# Patient Record
Sex: MALE | Race: WHITE | Employment: OTHER | URBAN - METROPOLITAN AREA
[De-identification: names, ages, dates, MRNs, and addresses within clinical notes are randomized per-mention and may not be internally consistent; named-entity substitution may affect disease eponyms.]

---

## 2019-01-01 ENCOUNTER — OFFICE VISIT (OUTPATIENT)
Dept: PRIMARY CARE CLINIC | Age: 84
End: 2019-01-01
Payer: MEDICARE

## 2019-01-01 ENCOUNTER — OUTSIDE SERVICES (OUTPATIENT)
Dept: PODIATRY | Age: 84
End: 2019-01-01
Payer: MEDICARE

## 2019-01-01 VITALS
DIASTOLIC BLOOD PRESSURE: 50 MMHG | TEMPERATURE: 98 F | BODY MASS INDEX: 21.98 KG/M2 | HEART RATE: 93 BPM | RESPIRATION RATE: 22 BRPM | WEIGHT: 157 LBS | SYSTOLIC BLOOD PRESSURE: 104 MMHG | HEIGHT: 71 IN | OXYGEN SATURATION: 97 %

## 2019-01-01 DIAGNOSIS — J84.10 PULMONARY FIBROSIS (HCC): ICD-10-CM

## 2019-01-01 DIAGNOSIS — J44.9 MODERATE CHRONIC OBSTRUCTIVE PULMONARY DISEASE (HCC): ICD-10-CM

## 2019-01-01 DIAGNOSIS — R26.2 DIFFICULTY WALKING: ICD-10-CM

## 2019-01-01 DIAGNOSIS — M79.675 PAIN IN LEFT TOE(S): ICD-10-CM

## 2019-01-01 DIAGNOSIS — M79.674 PAIN IN TOE OF RIGHT FOOT: ICD-10-CM

## 2019-01-01 DIAGNOSIS — F03.918 SENILE DEMENTIA WITH DELIRIUM WITH BEHAVIORAL DISTURBANCE: Primary | ICD-10-CM

## 2019-01-01 DIAGNOSIS — I73.9 PERIPHERAL VASCULAR DISEASE, UNSPECIFIED (HCC): ICD-10-CM

## 2019-01-01 DIAGNOSIS — B35.1 TINEA UNGUIUM: Primary | ICD-10-CM

## 2019-01-01 DIAGNOSIS — K59.09 OTHER CONSTIPATION: ICD-10-CM

## 2019-01-01 DIAGNOSIS — I10 ESSENTIAL HYPERTENSION: ICD-10-CM

## 2019-01-01 DIAGNOSIS — F05 SENILE DEMENTIA WITH DELIRIUM WITH BEHAVIORAL DISTURBANCE: Primary | ICD-10-CM

## 2019-01-01 DIAGNOSIS — R33.9 URINARY RETENTION: ICD-10-CM

## 2019-01-01 DIAGNOSIS — Z23 NEED FOR VACCINATION FOR STREP PNEUMONIAE: ICD-10-CM

## 2019-01-01 PROCEDURE — G0009 ADMIN PNEUMOCOCCAL VACCINE: HCPCS | Performed by: FAMILY MEDICINE

## 2019-01-01 PROCEDURE — G8420 CALC BMI NORM PARAMETERS: HCPCS | Performed by: FAMILY MEDICINE

## 2019-01-01 PROCEDURE — 90670 PCV13 VACCINE IM: CPT | Performed by: FAMILY MEDICINE

## 2019-01-01 PROCEDURE — 1123F ACP DISCUSS/DSCN MKR DOCD: CPT | Performed by: FAMILY MEDICINE

## 2019-01-01 PROCEDURE — G8482 FLU IMMUNIZE ORDER/ADMIN: HCPCS | Performed by: FAMILY MEDICINE

## 2019-01-01 PROCEDURE — 11721 DEBRIDE NAIL 6 OR MORE: CPT | Performed by: PODIATRIST

## 2019-01-01 ASSESSMENT — ENCOUNTER SYMPTOMS
EYES NEGATIVE: 1
RESPIRATORY NEGATIVE: 1

## 2019-03-24 ENCOUNTER — HOSPITAL ENCOUNTER (OUTPATIENT)
Age: 84
Setting detail: OBSERVATION
Discharge: OTHER INSTITUTION | End: 2019-03-26
Attending: EMERGENCY MEDICINE | Admitting: INTERNAL MEDICINE
Payer: MEDICARE

## 2019-03-24 DIAGNOSIS — R33.9 URINARY RETENTION: Primary | ICD-10-CM

## 2019-03-24 DIAGNOSIS — N17.9 ACUTE RENAL FAILURE, UNSPECIFIED ACUTE RENAL FAILURE TYPE (HCC): ICD-10-CM

## 2019-03-24 LAB
ANION GAP SERPL CALCULATED.3IONS-SCNC: 14 MMOL/L (ref 7–16)
BASOPHILS ABSOLUTE: 0 E9/L (ref 0–0.2)
BASOPHILS RELATIVE PERCENT: 0.2 % (ref 0–2)
BILIRUBIN URINE: NEGATIVE
BLOOD, URINE: NEGATIVE
BUN BLDV-MCNC: 56 MG/DL (ref 8–23)
CALCIUM SERPL-MCNC: 8.6 MG/DL (ref 8.6–10.2)
CHLORIDE BLD-SCNC: 104 MMOL/L (ref 98–107)
CLARITY: CLEAR
CO2: 23 MMOL/L (ref 22–29)
COLOR: YELLOW
CREAT SERPL-MCNC: 2.4 MG/DL (ref 0.7–1.2)
EOSINOPHILS ABSOLUTE: 0.21 E9/L (ref 0.05–0.5)
EOSINOPHILS RELATIVE PERCENT: 1.7 % (ref 0–6)
GFR AFRICAN AMERICAN: 31
GFR NON-AFRICAN AMERICAN: 26 ML/MIN/1.73
GLUCOSE BLD-MCNC: 118 MG/DL (ref 74–99)
GLUCOSE URINE: NEGATIVE MG/DL
HCT VFR BLD CALC: 43 % (ref 37–54)
HEMOGLOBIN: 13.6 G/DL (ref 12.5–16.5)
KETONES, URINE: NEGATIVE MG/DL
LEUKOCYTE ESTERASE, URINE: NEGATIVE
LYMPHOCYTES ABSOLUTE: 1.26 E9/L (ref 1.5–4)
LYMPHOCYTES RELATIVE PERCENT: 9.6 % (ref 20–42)
MCH RBC QN AUTO: 30.5 PG (ref 26–35)
MCHC RBC AUTO-ENTMCNC: 31.6 % (ref 32–34.5)
MCV RBC AUTO: 96.4 FL (ref 80–99.9)
MONOCYTES ABSOLUTE: 1.89 E9/L (ref 0.1–0.95)
MONOCYTES RELATIVE PERCENT: 14.8 % (ref 2–12)
NEUTROPHILS ABSOLUTE: 9.32 E9/L (ref 1.8–7.3)
NEUTROPHILS RELATIVE PERCENT: 73.9 % (ref 43–80)
NITRITE, URINE: NEGATIVE
PDW BLD-RTO: 14.1 FL (ref 11.5–15)
PH UA: 5.5 (ref 5–9)
PLATELET # BLD: 154 E9/L (ref 130–450)
PMV BLD AUTO: 12 FL (ref 7–12)
POTASSIUM SERPL-SCNC: 4.7 MMOL/L (ref 3.5–5)
PROSTATE SPECIFIC ANTIGEN: 1.38 NG/ML (ref 0–4)
PROTEIN UA: NEGATIVE MG/DL
RBC # BLD: 4.46 E12/L (ref 3.8–5.8)
RBC # BLD: NORMAL 10*6/UL
SODIUM BLD-SCNC: 141 MMOL/L (ref 132–146)
SPECIFIC GRAVITY UA: 1.02 (ref 1–1.03)
UROBILINOGEN, URINE: 0.2 E.U./DL
WBC # BLD: 12.6 E9/L (ref 4.5–11.5)

## 2019-03-24 PROCEDURE — 96360 HYDRATION IV INFUSION INIT: CPT

## 2019-03-24 PROCEDURE — 81003 URINALYSIS AUTO W/O SCOPE: CPT

## 2019-03-24 PROCEDURE — 2580000003 HC RX 258: Performed by: INTERNAL MEDICINE

## 2019-03-24 PROCEDURE — 36415 COLL VENOUS BLD VENIPUNCTURE: CPT

## 2019-03-24 PROCEDURE — G0103 PSA SCREENING: HCPCS

## 2019-03-24 PROCEDURE — 6370000000 HC RX 637 (ALT 250 FOR IP): Performed by: INTERNAL MEDICINE

## 2019-03-24 PROCEDURE — G0378 HOSPITAL OBSERVATION PER HR: HCPCS

## 2019-03-24 PROCEDURE — 2580000003 HC RX 258: Performed by: EMERGENCY MEDICINE

## 2019-03-24 PROCEDURE — 94640 AIRWAY INHALATION TREATMENT: CPT

## 2019-03-24 PROCEDURE — 85025 COMPLETE CBC W/AUTO DIFF WBC: CPT

## 2019-03-24 PROCEDURE — 99284 EMERGENCY DEPT VISIT MOD MDM: CPT

## 2019-03-24 PROCEDURE — 51702 INSERT TEMP BLADDER CATH: CPT

## 2019-03-24 PROCEDURE — 80048 BASIC METABOLIC PNL TOTAL CA: CPT

## 2019-03-24 RX ORDER — OLANZAPINE 5 MG/1
5 TABLET ORAL NIGHTLY
Status: DISCONTINUED | OUTPATIENT
Start: 2019-03-24 | End: 2019-03-26 | Stop reason: HOSPADM

## 2019-03-24 RX ORDER — IPRATROPIUM BROMIDE AND ALBUTEROL SULFATE 2.5; .5 MG/3ML; MG/3ML
1 SOLUTION RESPIRATORY (INHALATION) 4 TIMES DAILY
Status: DISCONTINUED | OUTPATIENT
Start: 2019-03-24 | End: 2019-03-26 | Stop reason: HOSPADM

## 2019-03-24 RX ORDER — GUAIFENESIN 600 MG/1
1200 TABLET, EXTENDED RELEASE ORAL 2 TIMES DAILY
Status: DISCONTINUED | OUTPATIENT
Start: 2019-03-24 | End: 2019-03-26 | Stop reason: HOSPADM

## 2019-03-24 RX ORDER — FINASTERIDE 5 MG/1
5 TABLET, FILM COATED ORAL DAILY
Status: DISCONTINUED | OUTPATIENT
Start: 2019-03-24 | End: 2019-03-26 | Stop reason: HOSPADM

## 2019-03-24 RX ORDER — TAMSULOSIN HYDROCHLORIDE 0.4 MG/1
0.4 CAPSULE ORAL DAILY
Status: DISCONTINUED | OUTPATIENT
Start: 2019-03-24 | End: 2019-03-26 | Stop reason: HOSPADM

## 2019-03-24 RX ORDER — ACETAMINOPHEN 325 MG/1
650 TABLET ORAL EVERY 6 HOURS PRN
Status: DISCONTINUED | OUTPATIENT
Start: 2019-03-24 | End: 2019-03-26 | Stop reason: HOSPADM

## 2019-03-24 RX ORDER — DUTASTERIDE 0.5 MG/1
0.5 CAPSULE, LIQUID FILLED ORAL DAILY
Status: DISCONTINUED | OUTPATIENT
Start: 2019-03-24 | End: 2019-03-24 | Stop reason: CLARIF

## 2019-03-24 RX ORDER — RIVASTIGMINE 4.6 MG/24H
1 PATCH, EXTENDED RELEASE TRANSDERMAL DAILY
Status: DISCONTINUED | OUTPATIENT
Start: 2019-03-24 | End: 2019-03-26 | Stop reason: HOSPADM

## 2019-03-24 RX ORDER — SODIUM CHLORIDE, SODIUM LACTATE, POTASSIUM CHLORIDE, CALCIUM CHLORIDE 600; 310; 30; 20 MG/100ML; MG/100ML; MG/100ML; MG/100ML
INJECTION, SOLUTION INTRAVENOUS CONTINUOUS
Status: DISCONTINUED | OUTPATIENT
Start: 2019-03-24 | End: 2019-03-26 | Stop reason: HOSPADM

## 2019-03-24 RX ORDER — FUROSEMIDE 20 MG/1
20 TABLET ORAL DAILY
Status: DISCONTINUED | OUTPATIENT
Start: 2019-03-24 | End: 2019-03-24

## 2019-03-24 RX ORDER — ONDANSETRON 4 MG/1
4 TABLET, ORALLY DISINTEGRATING ORAL EVERY 6 HOURS PRN
Status: DISCONTINUED | OUTPATIENT
Start: 2019-03-24 | End: 2019-03-26 | Stop reason: HOSPADM

## 2019-03-24 RX ORDER — POLYVINYL ALCOHOL 14 MG/ML
1 SOLUTION/ DROPS OPHTHALMIC PRN
Status: DISCONTINUED | OUTPATIENT
Start: 2019-03-24 | End: 2019-03-26 | Stop reason: HOSPADM

## 2019-03-24 RX ORDER — FUROSEMIDE 20 MG/1
20 TABLET ORAL DAILY
COMMUNITY
Start: 2018-09-27 | End: 2020-01-01 | Stop reason: ALTCHOICE

## 2019-03-24 RX ORDER — MIRTAZAPINE 15 MG/1
30 TABLET, FILM COATED ORAL NIGHTLY
Status: DISCONTINUED | OUTPATIENT
Start: 2019-03-24 | End: 2019-03-26 | Stop reason: HOSPADM

## 2019-03-24 RX ORDER — 0.9 % SODIUM CHLORIDE 0.9 %
1000 INTRAVENOUS SOLUTION INTRAVENOUS ONCE
Status: COMPLETED | OUTPATIENT
Start: 2019-03-24 | End: 2019-03-24

## 2019-03-24 RX ORDER — MEGESTROL ACETATE 40 MG/ML
400 SUSPENSION ORAL DAILY
Status: DISCONTINUED | OUTPATIENT
Start: 2019-03-24 | End: 2019-03-26 | Stop reason: HOSPADM

## 2019-03-24 RX ORDER — ASPIRIN 81 MG/1
81 TABLET, CHEWABLE ORAL DAILY
Status: DISCONTINUED | OUTPATIENT
Start: 2019-03-24 | End: 2019-03-26 | Stop reason: HOSPADM

## 2019-03-24 RX ADMIN — ASPIRIN 81 MG 81 MG: 81 TABLET ORAL at 19:39

## 2019-03-24 RX ADMIN — MEGESTROL ACETATE 400 MG: 40 SUSPENSION ORAL at 19:40

## 2019-03-24 RX ADMIN — TAMSULOSIN HYDROCHLORIDE 0.4 MG: 0.4 CAPSULE ORAL at 19:39

## 2019-03-24 RX ADMIN — FINASTERIDE 5 MG: 5 TABLET, FILM COATED ORAL at 19:39

## 2019-03-24 RX ADMIN — SERTRALINE HYDROCHLORIDE 50 MG: 50 TABLET ORAL at 19:39

## 2019-03-24 RX ADMIN — IPRATROPIUM BROMIDE AND ALBUTEROL SULFATE 3 ML: .5; 3 SOLUTION RESPIRATORY (INHALATION) at 18:01

## 2019-03-24 RX ADMIN — SODIUM CHLORIDE, POTASSIUM CHLORIDE, SODIUM LACTATE AND CALCIUM CHLORIDE: 600; 310; 30; 20 INJECTION, SOLUTION INTRAVENOUS at 17:30

## 2019-03-24 RX ADMIN — GUAIFENESIN 1200 MG: 600 TABLET, EXTENDED RELEASE ORAL at 21:10

## 2019-03-24 RX ADMIN — SODIUM CHLORIDE 1000 ML: 9 INJECTION, SOLUTION INTRAVENOUS at 09:02

## 2019-03-24 RX ADMIN — MIRTAZAPINE 30 MG: 15 TABLET, FILM COATED ORAL at 21:09

## 2019-03-24 RX ADMIN — OLANZAPINE 5 MG: 5 TABLET, FILM COATED ORAL at 21:09

## 2019-03-24 ASSESSMENT — ENCOUNTER SYMPTOMS
SORE THROAT: 0
COUGH: 0
SHORTNESS OF BREATH: 0
RHINORRHEA: 0

## 2019-03-24 ASSESSMENT — PAIN SCALES - GENERAL
PAINLEVEL_OUTOF10: 0
PAINLEVEL_OUTOF10: 7
PAINLEVEL_OUTOF10: 0

## 2019-03-24 ASSESSMENT — PAIN DESCRIPTION - DESCRIPTORS: DESCRIPTORS: ACHING

## 2019-03-24 ASSESSMENT — PAIN DESCRIPTION - FREQUENCY: FREQUENCY: CONTINUOUS

## 2019-03-24 ASSESSMENT — PAIN DESCRIPTION - PAIN TYPE: TYPE: ACUTE PAIN

## 2019-03-24 ASSESSMENT — PAIN DESCRIPTION - ORIENTATION: ORIENTATION: LOWER

## 2019-03-24 ASSESSMENT — PAIN DESCRIPTION - LOCATION: LOCATION: ABDOMEN

## 2019-03-25 ENCOUNTER — APPOINTMENT (OUTPATIENT)
Dept: ULTRASOUND IMAGING | Age: 84
End: 2019-03-25
Payer: MEDICARE

## 2019-03-25 LAB
ALBUMIN SERPL-MCNC: 3.5 G/DL (ref 3.5–5.2)
ALP BLD-CCNC: 68 U/L (ref 40–129)
ALT SERPL-CCNC: 23 U/L (ref 0–40)
ANION GAP SERPL CALCULATED.3IONS-SCNC: 11 MMOL/L (ref 7–16)
AST SERPL-CCNC: 27 U/L (ref 0–39)
BASOPHILS ABSOLUTE: 0.02 E9/L (ref 0–0.2)
BASOPHILS RELATIVE PERCENT: 0.2 % (ref 0–2)
BILIRUB SERPL-MCNC: 0.4 MG/DL (ref 0–1.2)
BUN BLDV-MCNC: 41 MG/DL (ref 8–23)
CALCIUM SERPL-MCNC: 8.1 MG/DL (ref 8.6–10.2)
CHLORIDE BLD-SCNC: 107 MMOL/L (ref 98–107)
CHOLESTEROL, TOTAL: 116 MG/DL (ref 0–199)
CO2: 24 MMOL/L (ref 22–29)
CREAT SERPL-MCNC: 1.5 MG/DL (ref 0.7–1.2)
EOSINOPHILS ABSOLUTE: 0.28 E9/L (ref 0.05–0.5)
EOSINOPHILS RELATIVE PERCENT: 3.2 % (ref 0–6)
GFR AFRICAN AMERICAN: 53
GFR NON-AFRICAN AMERICAN: 44 ML/MIN/1.73
GLUCOSE BLD-MCNC: 108 MG/DL (ref 74–99)
HBA1C MFR BLD: 5.4 % (ref 4–5.6)
HCT VFR BLD CALC: 36 % (ref 37–54)
HDLC SERPL-MCNC: 30 MG/DL
HEMOGLOBIN: 11.3 G/DL (ref 12.5–16.5)
IMMATURE GRANULOCYTES #: 0.05 E9/L
IMMATURE GRANULOCYTES %: 0.6 % (ref 0–5)
LDL CHOLESTEROL CALCULATED: 64 MG/DL (ref 0–99)
LYMPHOCYTES ABSOLUTE: 2.68 E9/L (ref 1.5–4)
LYMPHOCYTES RELATIVE PERCENT: 30.6 % (ref 20–42)
MAGNESIUM: 2.2 MG/DL (ref 1.6–2.6)
MCH RBC QN AUTO: 30.1 PG (ref 26–35)
MCHC RBC AUTO-ENTMCNC: 31.4 % (ref 32–34.5)
MCV RBC AUTO: 96 FL (ref 80–99.9)
MONOCYTES ABSOLUTE: 1.15 E9/L (ref 0.1–0.95)
MONOCYTES RELATIVE PERCENT: 13.1 % (ref 2–12)
NEUTROPHILS ABSOLUTE: 4.57 E9/L (ref 1.8–7.3)
NEUTROPHILS RELATIVE PERCENT: 52.3 % (ref 43–80)
PDW BLD-RTO: 13.8 FL (ref 11.5–15)
PHOSPHORUS: 2.6 MG/DL (ref 2.5–4.5)
PLATELET # BLD: 131 E9/L (ref 130–450)
PMV BLD AUTO: 11.3 FL (ref 7–12)
POTASSIUM SERPL-SCNC: 4.5 MMOL/L (ref 3.5–5)
RBC # BLD: 3.75 E12/L (ref 3.8–5.8)
SEDIMENTATION RATE, ERYTHROCYTE: 25 MM/HR (ref 0–15)
SODIUM BLD-SCNC: 142 MMOL/L (ref 132–146)
TOTAL PROTEIN: 6.5 G/DL (ref 6.4–8.3)
TRIGL SERPL-MCNC: 111 MG/DL (ref 0–149)
TSH SERPL DL<=0.05 MIU/L-ACNC: 1.69 UIU/ML (ref 0.27–4.2)
VLDLC SERPL CALC-MCNC: 22 MG/DL
WBC # BLD: 8.8 E9/L (ref 4.5–11.5)

## 2019-03-25 PROCEDURE — 97161 PT EVAL LOW COMPLEX 20 MIN: CPT | Performed by: PHYSICAL THERAPIST

## 2019-03-25 PROCEDURE — 76770 US EXAM ABDO BACK WALL COMP: CPT

## 2019-03-25 PROCEDURE — 6370000000 HC RX 637 (ALT 250 FOR IP): Performed by: INTERNAL MEDICINE

## 2019-03-25 PROCEDURE — 84100 ASSAY OF PHOSPHORUS: CPT

## 2019-03-25 PROCEDURE — 80053 COMPREHEN METABOLIC PANEL: CPT

## 2019-03-25 PROCEDURE — 85025 COMPLETE CBC W/AUTO DIFF WBC: CPT

## 2019-03-25 PROCEDURE — 2580000003 HC RX 258: Performed by: INTERNAL MEDICINE

## 2019-03-25 PROCEDURE — 83036 HEMOGLOBIN GLYCOSYLATED A1C: CPT

## 2019-03-25 PROCEDURE — G0378 HOSPITAL OBSERVATION PER HR: HCPCS

## 2019-03-25 PROCEDURE — 84443 ASSAY THYROID STIM HORMONE: CPT

## 2019-03-25 PROCEDURE — 94640 AIRWAY INHALATION TREATMENT: CPT

## 2019-03-25 PROCEDURE — 6370000000 HC RX 637 (ALT 250 FOR IP): Performed by: UROLOGY

## 2019-03-25 PROCEDURE — 97116 GAIT TRAINING THERAPY: CPT | Performed by: PHYSICAL THERAPIST

## 2019-03-25 PROCEDURE — 85651 RBC SED RATE NONAUTOMATED: CPT

## 2019-03-25 PROCEDURE — 83735 ASSAY OF MAGNESIUM: CPT

## 2019-03-25 PROCEDURE — 36415 COLL VENOUS BLD VENIPUNCTURE: CPT

## 2019-03-25 PROCEDURE — 97530 THERAPEUTIC ACTIVITIES: CPT

## 2019-03-25 PROCEDURE — 80061 LIPID PANEL: CPT

## 2019-03-25 RX ORDER — BETHANECHOL CHLORIDE 5 MG
10 TABLET ORAL 3 TIMES DAILY
Status: DISCONTINUED | OUTPATIENT
Start: 2019-03-25 | End: 2019-03-26 | Stop reason: HOSPADM

## 2019-03-25 RX ADMIN — ASPIRIN 81 MG 81 MG: 81 TABLET ORAL at 08:49

## 2019-03-25 RX ADMIN — BETHANECHOL CHLORIDE 10 MG: 5 TABLET ORAL at 13:37

## 2019-03-25 RX ADMIN — MIRTAZAPINE 30 MG: 15 TABLET, FILM COATED ORAL at 20:57

## 2019-03-25 RX ADMIN — IPRATROPIUM BROMIDE AND ALBUTEROL SULFATE 3 ML: .5; 3 SOLUTION RESPIRATORY (INHALATION) at 17:48

## 2019-03-25 RX ADMIN — GUAIFENESIN 1200 MG: 600 TABLET, EXTENDED RELEASE ORAL at 08:48

## 2019-03-25 RX ADMIN — MEGESTROL ACETATE 400 MG: 40 SUSPENSION ORAL at 08:49

## 2019-03-25 RX ADMIN — IPRATROPIUM BROMIDE AND ALBUTEROL SULFATE 3 ML: .5; 3 SOLUTION RESPIRATORY (INHALATION) at 09:54

## 2019-03-25 RX ADMIN — BETHANECHOL CHLORIDE 10 MG: 5 TABLET ORAL at 08:48

## 2019-03-25 RX ADMIN — TAMSULOSIN HYDROCHLORIDE 0.4 MG: 0.4 CAPSULE ORAL at 08:48

## 2019-03-25 RX ADMIN — BETHANECHOL CHLORIDE 10 MG: 5 TABLET ORAL at 20:57

## 2019-03-25 RX ADMIN — FINASTERIDE 5 MG: 5 TABLET, FILM COATED ORAL at 08:50

## 2019-03-25 RX ADMIN — SODIUM CHLORIDE, POTASSIUM CHLORIDE, SODIUM LACTATE AND CALCIUM CHLORIDE: 600; 310; 30; 20 INJECTION, SOLUTION INTRAVENOUS at 13:07

## 2019-03-25 RX ADMIN — OLANZAPINE 5 MG: 5 TABLET, FILM COATED ORAL at 20:57

## 2019-03-25 RX ADMIN — IPRATROPIUM BROMIDE AND ALBUTEROL SULFATE 3 ML: .5; 3 SOLUTION RESPIRATORY (INHALATION) at 13:54

## 2019-03-25 RX ADMIN — SERTRALINE HYDROCHLORIDE 50 MG: 50 TABLET ORAL at 08:48

## 2019-03-25 RX ADMIN — IPRATROPIUM BROMIDE AND ALBUTEROL SULFATE 3 ML: .5; 3 SOLUTION RESPIRATORY (INHALATION) at 06:17

## 2019-03-25 RX ADMIN — GUAIFENESIN 1200 MG: 600 TABLET, EXTENDED RELEASE ORAL at 20:57

## 2019-03-25 ASSESSMENT — PAIN SCALES - GENERAL
PAINLEVEL_OUTOF10: 0
PAINLEVEL_OUTOF10: 0

## 2019-03-26 VITALS
DIASTOLIC BLOOD PRESSURE: 71 MMHG | SYSTOLIC BLOOD PRESSURE: 142 MMHG | BODY MASS INDEX: 26.53 KG/M2 | OXYGEN SATURATION: 98 % | WEIGHT: 169 LBS | TEMPERATURE: 97.4 F | RESPIRATION RATE: 17 BRPM | HEIGHT: 67 IN | HEART RATE: 78 BPM

## 2019-03-26 LAB
ANION GAP SERPL CALCULATED.3IONS-SCNC: 8 MMOL/L (ref 7–16)
BASOPHILS ABSOLUTE: 0 E9/L (ref 0–0.2)
BASOPHILS RELATIVE PERCENT: 0.2 % (ref 0–2)
BUN BLDV-MCNC: 29 MG/DL (ref 8–23)
CALCIUM SERPL-MCNC: 8.6 MG/DL (ref 8.6–10.2)
CHLORIDE BLD-SCNC: 106 MMOL/L (ref 98–107)
CO2: 27 MMOL/L (ref 22–29)
CREAT SERPL-MCNC: 1.3 MG/DL (ref 0.7–1.2)
EOSINOPHILS ABSOLUTE: 0.64 E9/L (ref 0.05–0.5)
EOSINOPHILS RELATIVE PERCENT: 6.2 % (ref 0–6)
GFR AFRICAN AMERICAN: >60
GFR NON-AFRICAN AMERICAN: 52 ML/MIN/1.73
GLUCOSE BLD-MCNC: 108 MG/DL (ref 74–99)
HCT VFR BLD CALC: 35.6 % (ref 37–54)
HEMOGLOBIN: 11.4 G/DL (ref 12.5–16.5)
LYMPHOCYTES ABSOLUTE: 2.08 E9/L (ref 1.5–4)
LYMPHOCYTES RELATIVE PERCENT: 19.5 % (ref 20–42)
MCH RBC QN AUTO: 30.6 PG (ref 26–35)
MCHC RBC AUTO-ENTMCNC: 32 % (ref 32–34.5)
MCV RBC AUTO: 95.7 FL (ref 80–99.9)
MONOCYTES ABSOLUTE: 1.25 E9/L (ref 0.1–0.95)
MONOCYTES RELATIVE PERCENT: 11.5 % (ref 2–12)
NEUTROPHILS ABSOLUTE: 6.55 E9/L (ref 1.8–7.3)
NEUTROPHILS RELATIVE PERCENT: 62.8 % (ref 43–80)
PDW BLD-RTO: 13.6 FL (ref 11.5–15)
PLATELET # BLD: 135 E9/L (ref 130–450)
PMV BLD AUTO: 11.7 FL (ref 7–12)
POTASSIUM SERPL-SCNC: 4.3 MMOL/L (ref 3.5–5)
RBC # BLD: 3.72 E12/L (ref 3.8–5.8)
SODIUM BLD-SCNC: 141 MMOL/L (ref 132–146)
WBC # BLD: 10.4 E9/L (ref 4.5–11.5)

## 2019-03-26 PROCEDURE — G0378 HOSPITAL OBSERVATION PER HR: HCPCS

## 2019-03-26 PROCEDURE — 97110 THERAPEUTIC EXERCISES: CPT

## 2019-03-26 PROCEDURE — 97530 THERAPEUTIC ACTIVITIES: CPT

## 2019-03-26 PROCEDURE — 80048 BASIC METABOLIC PNL TOTAL CA: CPT

## 2019-03-26 PROCEDURE — 6370000000 HC RX 637 (ALT 250 FOR IP): Performed by: INTERNAL MEDICINE

## 2019-03-26 PROCEDURE — 85025 COMPLETE CBC W/AUTO DIFF WBC: CPT

## 2019-03-26 PROCEDURE — 94640 AIRWAY INHALATION TREATMENT: CPT

## 2019-03-26 PROCEDURE — 6370000000 HC RX 637 (ALT 250 FOR IP): Performed by: UROLOGY

## 2019-03-26 PROCEDURE — 36415 COLL VENOUS BLD VENIPUNCTURE: CPT

## 2019-03-26 PROCEDURE — 2580000003 HC RX 258: Performed by: INTERNAL MEDICINE

## 2019-03-26 RX ORDER — BETHANECHOL CHLORIDE 10 MG/1
10 TABLET ORAL 3 TIMES DAILY
Qty: 90 TABLET | Refills: 3 | Status: SHIPPED | OUTPATIENT
Start: 2019-03-26 | End: 2019-07-02 | Stop reason: SDUPTHER

## 2019-03-26 RX ADMIN — BETHANECHOL CHLORIDE 10 MG: 5 TABLET ORAL at 13:30

## 2019-03-26 RX ADMIN — SODIUM CHLORIDE, POTASSIUM CHLORIDE, SODIUM LACTATE AND CALCIUM CHLORIDE: 600; 310; 30; 20 INJECTION, SOLUTION INTRAVENOUS at 00:12

## 2019-03-26 RX ADMIN — ASPIRIN 81 MG 81 MG: 81 TABLET ORAL at 09:05

## 2019-03-26 RX ADMIN — TAMSULOSIN HYDROCHLORIDE 0.4 MG: 0.4 CAPSULE ORAL at 09:05

## 2019-03-26 RX ADMIN — GUAIFENESIN 1200 MG: 600 TABLET, EXTENDED RELEASE ORAL at 09:05

## 2019-03-26 RX ADMIN — IPRATROPIUM BROMIDE AND ALBUTEROL SULFATE 3 ML: .5; 3 SOLUTION RESPIRATORY (INHALATION) at 09:30

## 2019-03-26 RX ADMIN — SODIUM CHLORIDE, POTASSIUM CHLORIDE, SODIUM LACTATE AND CALCIUM CHLORIDE: 600; 310; 30; 20 INJECTION, SOLUTION INTRAVENOUS at 12:21

## 2019-03-26 RX ADMIN — SERTRALINE HYDROCHLORIDE 50 MG: 50 TABLET ORAL at 09:05

## 2019-03-26 RX ADMIN — IPRATROPIUM BROMIDE AND ALBUTEROL SULFATE 3 ML: .5; 3 SOLUTION RESPIRATORY (INHALATION) at 05:58

## 2019-03-26 RX ADMIN — MEGESTROL ACETATE 400 MG: 40 SUSPENSION ORAL at 09:05

## 2019-03-26 RX ADMIN — FINASTERIDE 5 MG: 5 TABLET, FILM COATED ORAL at 09:05

## 2019-03-26 RX ADMIN — BETHANECHOL CHLORIDE 10 MG: 5 TABLET ORAL at 09:05

## 2019-03-26 ASSESSMENT — PAIN SCALES - GENERAL: PAINLEVEL_OUTOF10: 0

## 2019-04-14 ENCOUNTER — APPOINTMENT (OUTPATIENT)
Dept: CT IMAGING | Age: 84
End: 2019-04-14
Payer: MEDICARE

## 2019-04-14 ENCOUNTER — APPOINTMENT (OUTPATIENT)
Dept: GENERAL RADIOLOGY | Age: 84
End: 2019-04-14
Payer: MEDICARE

## 2019-04-14 ENCOUNTER — HOSPITAL ENCOUNTER (EMERGENCY)
Age: 84
Discharge: HOME | End: 2019-04-15
Attending: EMERGENCY MEDICINE
Payer: MEDICARE

## 2019-04-14 DIAGNOSIS — N30.00 ACUTE CYSTITIS WITHOUT HEMATURIA: Primary | ICD-10-CM

## 2019-04-14 LAB
ANION GAP SERPL CALCULATED.3IONS-SCNC: 11 MMOL/L (ref 7–16)
BASOPHILS ABSOLUTE: 0.03 E9/L (ref 0–0.2)
BASOPHILS RELATIVE PERCENT: 0.3 % (ref 0–2)
BUN BLDV-MCNC: 43 MG/DL (ref 8–23)
CALCIUM SERPL-MCNC: 9.2 MG/DL (ref 8.6–10.2)
CHLORIDE BLD-SCNC: 108 MMOL/L (ref 98–107)
CO2: 22 MMOL/L (ref 22–29)
CREAT SERPL-MCNC: 1.7 MG/DL (ref 0.7–1.2)
EOSINOPHILS ABSOLUTE: 0.51 E9/L (ref 0.05–0.5)
EOSINOPHILS RELATIVE PERCENT: 4.5 % (ref 0–6)
GFR AFRICAN AMERICAN: 46
GFR NON-AFRICAN AMERICAN: 38 ML/MIN/1.73
GLUCOSE BLD-MCNC: 151 MG/DL (ref 74–99)
HCT VFR BLD CALC: 36.5 % (ref 37–54)
HEMOGLOBIN: 11.6 G/DL (ref 12.5–16.5)
IMMATURE GRANULOCYTES #: 0.08 E9/L
IMMATURE GRANULOCYTES %: 0.7 % (ref 0–5)
LYMPHOCYTES ABSOLUTE: 1.56 E9/L (ref 1.5–4)
LYMPHOCYTES RELATIVE PERCENT: 13.8 % (ref 20–42)
MCH RBC QN AUTO: 30.1 PG (ref 26–35)
MCHC RBC AUTO-ENTMCNC: 31.8 % (ref 32–34.5)
MCV RBC AUTO: 94.8 FL (ref 80–99.9)
MONOCYTES ABSOLUTE: 1.49 E9/L (ref 0.1–0.95)
MONOCYTES RELATIVE PERCENT: 13.2 % (ref 2–12)
NEUTROPHILS ABSOLUTE: 7.64 E9/L (ref 1.8–7.3)
NEUTROPHILS RELATIVE PERCENT: 67.5 % (ref 43–80)
PDW BLD-RTO: 13.2 FL (ref 11.5–15)
PLATELET # BLD: 200 E9/L (ref 130–450)
PMV BLD AUTO: 11.4 FL (ref 7–12)
POTASSIUM REFLEX MAGNESIUM: 5 MMOL/L (ref 3.5–5)
RBC # BLD: 3.85 E12/L (ref 3.8–5.8)
SODIUM BLD-SCNC: 141 MMOL/L (ref 132–146)
WBC # BLD: 11.3 E9/L (ref 4.5–11.5)

## 2019-04-14 PROCEDURE — 70450 CT HEAD/BRAIN W/O DYE: CPT

## 2019-04-14 PROCEDURE — 2580000003 HC RX 258: Performed by: EMERGENCY MEDICINE

## 2019-04-14 PROCEDURE — 80048 BASIC METABOLIC PNL TOTAL CA: CPT

## 2019-04-14 PROCEDURE — 12001 RPR S/N/AX/GEN/TRNK 2.5CM/<: CPT

## 2019-04-14 PROCEDURE — 73030 X-RAY EXAM OF SHOULDER: CPT

## 2019-04-14 PROCEDURE — 85025 COMPLETE CBC W/AUTO DIFF WBC: CPT

## 2019-04-14 PROCEDURE — 99284 EMERGENCY DEPT VISIT MOD MDM: CPT

## 2019-04-14 PROCEDURE — 36415 COLL VENOUS BLD VENIPUNCTURE: CPT

## 2019-04-14 RX ORDER — 0.9 % SODIUM CHLORIDE 0.9 %
1000 INTRAVENOUS SOLUTION INTRAVENOUS ONCE
Status: COMPLETED | OUTPATIENT
Start: 2019-04-14 | End: 2019-04-15

## 2019-04-14 RX ADMIN — SODIUM CHLORIDE 1000 ML: 9 INJECTION, SOLUTION INTRAVENOUS at 23:53

## 2019-04-15 VITALS
DIASTOLIC BLOOD PRESSURE: 70 MMHG | TEMPERATURE: 98.5 F | OXYGEN SATURATION: 95 % | BODY MASS INDEX: 27.32 KG/M2 | HEART RATE: 91 BPM | RESPIRATION RATE: 24 BRPM | SYSTOLIC BLOOD PRESSURE: 120 MMHG | WEIGHT: 170 LBS | HEIGHT: 66 IN

## 2019-04-15 LAB
BACTERIA: ABNORMAL /HPF
BILIRUBIN URINE: NEGATIVE
BLOOD, URINE: ABNORMAL
CLARITY: ABNORMAL
COLOR: YELLOW
GLUCOSE URINE: NEGATIVE MG/DL
KETONES, URINE: NEGATIVE MG/DL
LEUKOCYTE ESTERASE, URINE: ABNORMAL
NITRITE, URINE: POSITIVE
PH UA: 5 (ref 5–9)
PROTEIN UA: ABNORMAL MG/DL
RBC UA: ABNORMAL /HPF (ref 0–2)
SPECIFIC GRAVITY UA: 1.02 (ref 1–1.03)
UROBILINOGEN, URINE: 0.2 E.U./DL
WBC UA: ABNORMAL /HPF (ref 0–5)

## 2019-04-15 PROCEDURE — 2580000003 HC RX 258: Performed by: EMERGENCY MEDICINE

## 2019-04-15 PROCEDURE — 81001 URINALYSIS AUTO W/SCOPE: CPT

## 2019-04-15 PROCEDURE — 87088 URINE BACTERIA CULTURE: CPT

## 2019-04-15 PROCEDURE — 6360000002 HC RX W HCPCS: Performed by: EMERGENCY MEDICINE

## 2019-04-15 PROCEDURE — 96374 THER/PROPH/DIAG INJ IV PUSH: CPT

## 2019-04-15 PROCEDURE — 87186 SC STD MICRODIL/AGAR DIL: CPT

## 2019-04-15 RX ORDER — CEPHALEXIN 250 MG/1
250 CAPSULE ORAL 2 TIMES DAILY
Qty: 10 CAPSULE | Refills: 0 | Status: SHIPPED | OUTPATIENT
Start: 2019-04-15 | End: 2019-04-20

## 2019-04-15 RX ADMIN — WATER 2 G: 1 INJECTION INTRAMUSCULAR; INTRAVENOUS; SUBCUTANEOUS at 02:32

## 2019-04-15 NOTE — ED PROVIDER NOTES
HPI:  4/14/19,   Time: 11:29 PM         Maureen Shane is a 80 y.o. male presenting to the ED for fall, beginning prior to arrival.  The complaint has been persistent, moderate in severity, and worsened by nothing. Patient's an 42-year-old male who sustained a mechanical fall, struck the back of his head, injured his right shoulder. No loss of consciousness. Patient with history of dementia and alert and oriented to self only. ROS:   Pertinent positives and negatives are stated within HPI, all other systems reviewed and are negative.  --------------------------------------------- PAST HISTORY ---------------------------------------------  Past Medical History:  has a past medical history of Chronic kidney disease, COPD (chronic obstructive pulmonary disease) ([facility] [address] Dementia, Depression, Hypertension, Neuromuscular disorder ([facility] [address] Other disorders of kidney and ureter in diseases classified elsewhere, Pneumonia, and Psychosis ([address] Past Surgical History:  has a past surgical history that includes Kidney removal; Abdomen surgery; Appendectomy; Tonsillectomy; Cholecystectomy; Colonoscopy; and Endoscopy, colon, diagnostic. Social History:  reports that he has quit smoking. He quit after 25.00 years of use. He has never used smokeless tobacco. He reports that he does not drink alcohol or use drugs. Family History: Family history is unknown by patient. The patients home medications have been reviewed. Allergies: Patient has no known allergies.     -------------------------------------------------- RESULTS -------------------------------------------------  All laboratory and radiology results have been personally reviewed by myself   LABS:  Results for orders placed or performed during the hospital encounter of 04/14/19   Urine Culture   Result Value Ref Range    Urine Culture, Routine      Organism Escherichia coli (A)     Urine Culture, Routine >100,000 CFU/ml  Sensitivity to follow      CBC Auto Differential   Result Value Ref Range    WBC 11.3 4.5 - 11.5 E9/L    RBC 3.85 3.80 - 5.80 E12/L    Hemoglobin 11.6 (L) 12.5 - 16.5 g/dL    Hematocrit 36.5 (L) 37.0 - 54.0 %    MCV 94.8 80.0 - 99.9 fL    MCH 30.1 26.0 - 35.0 pg    MCHC 31.8 (L) 32.0 - 34.5 %    RDW 13.2 11.5 - 15.0 fL    Platelets 339 873 - 322 E9/L    MPV 11.4 7.0 - 12.0 fL    Neutrophils % 67.5 43.0 - 80.0 %    Immature Granulocytes % 0.7 0.0 - 5.0 %    Lymphocytes % 13.8 (L) 20.0 - 42.0 %    Monocytes % 13.2 (H) 2.0 - 12.0 %    Eosinophils % 4.5 0.0 - 6.0 %    Basophils % 0.3 0.0 - 2.0 %    Neutrophils # 7.64 (H) 1.80 - 7.30 E9/L    Immature Granulocytes # 0.08 E9/L    Lymphocytes # 1.56 1.50 - 4.00 E9/L    Monocytes # 1.49 (H) 0.10 - 0.95 E9/L    Eosinophils # 0.51 (H) 0.05 - 0.50 E9/L    Basophils # 0.03 0.00 - 0.20 J3/M   Basic Metabolic Panel w/ Reflex to MG   Result Value Ref Range    Sodium 141 132 - 146 mmol/L    Potassium reflex Magnesium 5.0 3.5 - 5.0 mmol/L    Chloride 108 (H) 98 - 107 mmol/L    CO2 22 22 - 29 mmol/L    Anion Gap 11 7 - 16 mmol/L    Glucose 151 (H) 74 - 99 mg/dL    BUN 43 (H) 8 - 23 mg/dL    CREATININE 1.7 (H) 0.7 - 1.2 mg/dL    GFR Non-African American 38 >=60 mL/min/1.73    GFR African American 46     Calcium 9.2 8.6 - 10.2 mg/dL   Urinalysis, reflex to microscopic   Result Value Ref Range    Color, UA Yellow Straw/Yellow    Clarity, UA CLOUDY (A) Clear    Glucose, Ur Negative Negative mg/dL    Bilirubin Urine Negative Negative    Ketones, Urine Negative Negative mg/dL    Specific Gravity, UA 1.025 1.005 - 1.030    Blood, Urine SMALL (A) Negative    pH, UA 5.0 5.0 - 9.0    Protein, UA TRACE Negative mg/dL    Urobilinogen, Urine 0.2 <2.0 E.U./dL    Nitrite, Urine POSITIVE (A) Negative    Leukocyte Esterase, Urine LARGE (A) Negative   Microscopic Urinalysis   Result Value Ref Range    WBC, UA many 0 - 5 /HPF    RBC, UA 1-3 0 - 2 /HPF    Bacteria, UA MANY (A) /HPF   EKG 12 Lead   Result Value Ref Range    Ventricular Rate 87 BPM    Atrial Rate 87 BPM    P-R Interval 170 ms    QRS Duration 62 ms    Q-T Interval 348 ms    QTc Calculation (Bazett) 418 ms    P Axis 41 degrees    R Axis 24 degrees    T Axis 46 degrees       RADIOLOGY:  Interpreted by Radiologist.  XR SHOULDER RIGHT (MIN 2 VIEWS)   Final Result   1. No acute fracture. 2. Probable rotator cuff insufficiency. CT Head WO Contrast   Final Result   No acute intracranial hemorrhage or mass effect.             ------------------------- NURSING NOTES AND VITALS REVIEWED ---------------------------   The nursing notes within the ED encounter and vital signs as below have been reviewed. /70   Pulse 91   Temp 98.5 °F (36.9 °C) (Oral)   Resp 24   Ht 5' 6\" (1.676 m)   Wt 170 lb (77.1 kg)   SpO2 95%   BMI 27.44 kg/m²   Oxygen Saturation Interpretation: Normal      ---------------------------------------------------PHYSICAL EXAM--------------------------------------      Constitutional/General: Alert and oriented x1, well appearing, non toxic in NAD  Head: NC with small laceration to right posterior parietal area  Eyes: PERRL, EOMI  Mouth: Oropharynx clear, handling secretions, no trismus  Neck: Supple, full ROM, no meningeal signs  Pulmonary: Lungs clear to auscultation bilaterally, no wheezes, rales, or rhonchi. Not in respiratory distress  Cardiovascular:  Regular rate and rhythm, no murmurs, gallops, or rubs. 2+ distal pulses  Abdomen: Soft, non tender, non distended,   Extremities: Moves all extremities x 3. Warm and well perfused, right shoulder with anterior swelling and decreased ROM.   Skin: warm and dry without rash  Neurologic: GCS 15,  Psych: Normal Affect      ------------------------------ ED COURSE/MEDICAL DECISION MAKING----------------------  Medications   0.9 % sodium chloride bolus (0 mLs Intravenous Stopped 4/15/19 0137)   cefTRIAXone (ROCEPHIN) 2 g in sterile water 20 mL IV syringe (2 g Intravenous Given 4/15/19 6952)         Medical Decision Making:    Check CT head--no acute findings    LACERATION REPAIR  PROCEDURE NOTE:  Unless otherwise indicated, this procedure was done or directly supervised by the ED attending. Laceration #: 1. Location: right posterior parietal  Length: less than 1 cm. The wound area was cleansend with shur-clens and draped in a sterile fashion. The wound area was anesthetized with Lidocaine 1% without epinephrine. WOUND COMPLEXITY:    Debridement: partial thickness and None. Undermining: None. Wound Margins Revised: None. Flaps Aligned: no. The wound was explored with the following results no foreign body or tendon injury seen. The wound was closed with Dermabond. Counseling: The emergency provider has spoken with the patient and spouse/SO and discussed todays results, in addition to providing specific details for the plan of care and counseling regarding the diagnosis and prognosis. Questions are answered at this time and they are agreeable with the plan.      --------------------------------- IMPRESSION AND DISPOSITION ---------------------------------    IMPRESSION  1.  Acute cystitis without hematuria        DISPOSITION  Disposition: Discharge to nursing home  Patient condition is good                  Stevie Oreilly MD  04/16/19 2022

## 2019-04-17 LAB
ORGANISM: ABNORMAL
URINE CULTURE, ROUTINE: ABNORMAL
URINE CULTURE, ROUTINE: ABNORMAL

## 2019-04-22 LAB
EKG ATRIAL RATE: 87 BPM
EKG P AXIS: 41 DEGREES
EKG P-R INTERVAL: 170 MS
EKG Q-T INTERVAL: 348 MS
EKG QRS DURATION: 62 MS
EKG QTC CALCULATION (BAZETT): 418 MS
EKG R AXIS: 24 DEGREES
EKG T AXIS: 46 DEGREES
EKG VENTRICULAR RATE: 87 BPM

## 2019-06-03 DIAGNOSIS — N30.00 ACUTE CYSTITIS WITHOUT HEMATURIA: Primary | ICD-10-CM

## 2019-06-03 RX ORDER — SULFAMETHOXAZOLE AND TRIMETHOPRIM 800; 160 MG/1; MG/1
1 TABLET ORAL 2 TIMES DAILY
Qty: 14 TABLET | Refills: 0 | Status: SHIPPED | OUTPATIENT
Start: 2019-06-03 | End: 2019-06-08

## 2019-06-07 RX ORDER — ONDANSETRON 4 MG/1
TABLET, FILM COATED ORAL
Qty: 30 TABLET | Refills: 11 | Status: SHIPPED
Start: 2019-06-07 | End: 2020-01-01 | Stop reason: ALTCHOICE

## 2019-06-08 DIAGNOSIS — K59.09 OTHER CONSTIPATION: ICD-10-CM

## 2019-06-08 DIAGNOSIS — N40.0 ENLARGED PROSTATE WITHOUT LOWER URINARY TRACT SYMPTOMS (LUTS): ICD-10-CM

## 2019-06-08 DIAGNOSIS — Z91.81 HISTORY OF FALLING: ICD-10-CM

## 2019-06-08 DIAGNOSIS — F03.91 DEMENTIA WITH BEHAVIORAL DISTURBANCE, UNSPECIFIED DEMENTIA TYPE: ICD-10-CM

## 2019-06-08 DIAGNOSIS — Z87.01 HISTORY OF RECURRENT PNEUMONIA: ICD-10-CM

## 2019-06-08 RX ORDER — MIRTAZAPINE 15 MG/1
1 TABLET, FILM COATED ORAL NIGHTLY
Refills: 11 | COMMUNITY
Start: 2019-05-08 | End: 2019-08-28 | Stop reason: SDUPTHER

## 2019-06-08 RX ORDER — ASPIRIN 81 MG/1
1 TABLET, COATED ORAL DAILY
Refills: 11 | COMMUNITY
Start: 2016-07-20

## 2019-06-09 NOTE — PROGRESS NOTES
11/2018. Feb. Social history: . Was  to CAROLEE for 30 years. No children. S[facility] in [employer] [address] [place] [address] [place]. Non-smoker, no alcohol. Reg diet. DNR-CCA. PHYSICAL EXAM:      Vitals:    06/10/19 0943   BP: (!) 124/94   Pulse: 92   Resp: 20   Temp: 97.6 °F (36.4 °C)   SpO2: 98%  Comment: 4 LPM   Weight: 162 lb 6.4 oz (73.7 kg)   Height: 5' 6\" (1.676 m)        GEN: elderly male patient sitting in recliner in NAD with easy respirations. HEAD:  atraumatic, normocephalic. EYES:  EOMI, PERRL, L with mod-severe cataract, unable to see with L eye, bilateral conjunctiva normal.   ENT:   Fair hearing, EACs with small wax, TMs nml, nasal septum midline, mild congestion, oral cavity without lesions, edentulous full dentures. NECK:  fair-good ROM, no palpable masses, no carotid bruits, no JVD. LUNGS:  No rales, rhonchi or wheezes. HEART:  RRR, no murmurs or gallops. ABD:  soft, non-tender to palp, no palp masses or HSM, normal BS. BACK: Lumbar scar, no scoliosis or kyphosis, non-tender to palp. EXTREMITIES:  No edema, no ulcerations, varicosities or erythema. No gross deformities. MUSCULOSKELETAL:  limited ROM of all joints, non tender to palp and or with movement. Very decreased AROM L arm, has good passive ROM. SKIN:  No ulcerations or breakdown, rash, ecchymosis, or other lesions. NEURO:  Able to stand with help, walks slowly with walker. No tremor, motor UEs 5/5 LEs  5/5, sensory normal, mild ataxia. PSYCH:  Pleasant and cooperative. Fluid speech, interactive and oriented to person only. No delusional statements.        Meds reviewed  Labs:  2/13/19 GFR 52, BUN 28, lytes nl, Pro BNP 38 11/7/18 proBNP 53  8/28/18 Gfr 50, lytes nml, ProBNP/ 87.0  6/4/18 CXR/ no definite acute or suspicious process  5/1/18 GFR: 60.3, BUN 15, CR 1.2, Lytes nl, LFTs nl, H/H 12/38, TSH 3.80, Pro BNP 67  3/27/18 CXR/ resolving BL bibasilar infiltrate/pneumonia  3/6/18 CXR/ Patchy bibasilar infiltrate/pneumonia    ASSESSMENT:  Elderly male with has Pulmonary fibrosis ([facility] [address] Urinary retention; Dysthymia; Essential hypertension; Unspecified dementia with behavioral disturbance; History of falling; Enlarged prostate without lower urinary tract symptoms (luts); Other constipation; Unspecified chronic bronchitis ([address] and History of recurrent pneumonia on their problem list.     Diagnoses attached to this encounter:  Ean Yanez was seen today for shortness of breath. Diagnoses and all orders for this visit:    Acute cystitis without hematuria    Pulmonary fibrosis ([facility] [address]    Dementia with behavioral disturbance, unspecified dementia type    History of falling       PLAN:  Finish Bactrim. Check BMP and proBNP. Continue O2 with nebulizer txs QID. Monitor BMP and Pro BNP q 3 months next in Sept. Use walker while ambulating to prevent falls. Continue other meds as per Rx List.   Recheck 1 month. 40 minutes spent on visit, 25 minutes regarding pulmonary, dementia, cardiac, HTN and depression disease processes, treatment options, meds and coordination of care with CHAR. Current Outpatient Medications   Medication Sig Dispense Refill    ASPIRIN LOW DOSE 81 MG EC tablet Take 1 tablet by mouth daily  11    MUCINEX 600 MG extended release tablet Take 1 tablet by mouth 2 times daily  11    mirtazapine (REMERON) 15 MG tablet Take 1 tablet by mouth nightly  11    ondansetron (ZOFRAN) 4 MG tablet TAKE 1 TABLET BY MOUTH THREE TIMES DAILY AS NEEDED FOR N/V. 30 tablet 11    bethanechol (URECHOLINE) 10 MG tablet Take 1 tablet by mouth 3 times daily 90 tablet 3    furosemide (LASIX) 20 MG tablet Take 20 mg by mouth daily      OXYGEN 2L NC; Titrate to SpO2 92-95%.  1 Device 0    sertraline (ZOLOFT) 50 MG tablet Take 50 mg by mouth daily      OLANZapine (ZYPREXA) 5 MG tablet Take 5 mg by mouth nightly      ipratropium-albuterol (DUONEB) 0.5-2.5 (3) MG/3ML SOLN nebulizer solution Inhale 1 vial into the lungs 4 times

## 2019-06-10 ENCOUNTER — NURSE ONLY (OUTPATIENT)
Dept: PRIMARY CARE CLINIC | Age: 84
End: 2019-06-10
Payer: MEDICARE

## 2019-06-10 VITALS
HEIGHT: 66 IN | BODY MASS INDEX: 26.1 KG/M2 | OXYGEN SATURATION: 98 % | RESPIRATION RATE: 20 BRPM | SYSTOLIC BLOOD PRESSURE: 124 MMHG | HEART RATE: 92 BPM | WEIGHT: 162.4 LBS | TEMPERATURE: 97.6 F | DIASTOLIC BLOOD PRESSURE: 94 MMHG

## 2019-06-10 DIAGNOSIS — J84.10 PULMONARY FIBROSIS (HCC): ICD-10-CM

## 2019-06-10 DIAGNOSIS — F03.91 DEMENTIA WITH BEHAVIORAL DISTURBANCE, UNSPECIFIED DEMENTIA TYPE: ICD-10-CM

## 2019-06-10 DIAGNOSIS — N30.00 ACUTE CYSTITIS WITHOUT HEMATURIA: Primary | ICD-10-CM

## 2019-06-10 DIAGNOSIS — I10 ESSENTIAL HYPERTENSION: ICD-10-CM

## 2019-06-10 DIAGNOSIS — Z91.81 HISTORY OF FALLING: ICD-10-CM

## 2019-06-11 DIAGNOSIS — I10 ESSENTIAL HYPERTENSION: ICD-10-CM

## 2019-06-12 DIAGNOSIS — I10 ESSENTIAL HYPERTENSION: ICD-10-CM

## 2019-07-02 RX ORDER — BETHANECHOL CHLORIDE 10 MG/1
10 TABLET ORAL 3 TIMES DAILY
Qty: 90 TABLET | Refills: 10 | Status: SHIPPED
Start: 2019-07-02 | End: 2020-01-01

## 2019-07-05 ENCOUNTER — OUTSIDE SERVICES (OUTPATIENT)
Dept: PODIATRY | Age: 84
End: 2019-07-05
Payer: MEDICARE

## 2019-07-05 DIAGNOSIS — M79.675 PAIN IN LEFT TOE(S): ICD-10-CM

## 2019-07-05 DIAGNOSIS — I73.9 PERIPHERAL VASCULAR DISEASE, UNSPECIFIED (HCC): ICD-10-CM

## 2019-07-05 DIAGNOSIS — R26.2 DIFFICULTY WALKING: ICD-10-CM

## 2019-07-05 DIAGNOSIS — M79.674 PAIN IN TOE OF RIGHT FOOT: ICD-10-CM

## 2019-07-05 DIAGNOSIS — B35.1 TINEA UNGUIUM: Primary | ICD-10-CM

## 2019-07-05 PROCEDURE — 11721 DEBRIDE NAIL 6 OR MORE: CPT | Performed by: PODIATRIST

## 2019-07-05 NOTE — PROGRESS NOTES
[id number] [facility] patient     No chief complaint on file. Subjective:  Sonja Vera is seen in nursing home  per nursing for foot and nail care. Pt currently has complaint of thickened, painful, elongated nails that he/she cannot manage by themselves. Pt. Relates pain to nails with shoe gear. Pt's primary care physician is Lilli Pak MD.6/1/2019  Past Medical History:   Diagnosis Date    Chronic kidney disease     COPD (chronic obstructive pulmonary disease) ([facility] [address]     Dementia     Depression     Hypertension     Neuromuscular disorder ([facility] [address]     Other disorders of kidney and ureter in diseases classified elsewhere     Pneumonia     Psychosis ([facility] [address]        No Known Allergies  Current Outpatient Medications on File Prior to Visit   Medication Sig Dispense Refill    bethanechol (URECHOLINE) 10 MG tablet TAKE 1 TABLET BY MOUTH 3 TIMES DAILY 90 tablet 10    ASPIRIN LOW DOSE 81 MG EC tablet Take 1 tablet by mouth daily  11    MUCINEX 600 MG extended release tablet Take 1 tablet by mouth 2 times daily  11    mirtazapine (REMERON) 15 MG tablet Take 1 tablet by mouth nightly  11    ondansetron (ZOFRAN) 4 MG tablet TAKE 1 TABLET BY MOUTH THREE TIMES DAILY AS NEEDED FOR N/V. 30 tablet 11    furosemide (LASIX) 20 MG tablet Take 20 mg by mouth daily      OXYGEN 2L NC; Titrate to SpO2 92-95%. 1 Device 0    sertraline (ZOLOFT) 50 MG tablet Take 50 mg by mouth daily      OLANZapine (ZYPREXA) 5 MG tablet Take 5 mg by mouth nightly      ipratropium-albuterol (DUONEB) 0.5-2.5 (3) MG/3ML SOLN nebulizer solution Inhale 1 vial into the lungs 4 times daily.  rivastigmine (EXELON) 4.6 MG/24HR Place 1 patch onto the skin daily.  magnesium hydroxide (MILK OF MAGNESIA CONCENTRATE) 2400 MG/10ML SUSP Take 2,400 mg by mouth daily as needed.  acetaminophen 650 MG TABS Take 650 mg by mouth every 6 hours as needed for Pain.  120 tablet     tamsulosin (FLOMAX) 0.4 MG capsule Take 1 capsule

## 2019-07-15 ENCOUNTER — TELEPHONE (OUTPATIENT)
Dept: PRIMARY CARE CLINIC | Age: 84
End: 2019-07-15

## 2019-07-15 RX ORDER — CEFUROXIME AXETIL 500 MG/1
500 TABLET ORAL 2 TIMES DAILY
Qty: 20 TABLET | Refills: 0 | Status: SHIPPED | OUTPATIENT
Start: 2019-07-15 | End: 2019-07-25

## 2019-07-19 ENCOUNTER — OFFICE VISIT (OUTPATIENT)
Dept: PRIMARY CARE CLINIC | Age: 84
End: 2019-07-19
Payer: MEDICARE

## 2019-07-19 VITALS
DIASTOLIC BLOOD PRESSURE: 54 MMHG | HEART RATE: 87 BPM | HEIGHT: 66 IN | RESPIRATION RATE: 18 BRPM | OXYGEN SATURATION: 98 % | SYSTOLIC BLOOD PRESSURE: 104 MMHG | BODY MASS INDEX: 26.74 KG/M2 | WEIGHT: 166.4 LBS | TEMPERATURE: 97.4 F

## 2019-07-19 DIAGNOSIS — J84.10 PULMONARY FIBROSIS (HCC): ICD-10-CM

## 2019-07-19 DIAGNOSIS — J44.9 CHRONIC OBSTRUCTIVE PULMONARY DISEASE, UNSPECIFIED COPD TYPE (HCC): ICD-10-CM

## 2019-07-19 DIAGNOSIS — N39.0 UTI (URINARY TRACT INFECTION), BACTERIAL: Primary | ICD-10-CM

## 2019-07-19 DIAGNOSIS — I10 ESSENTIAL HYPERTENSION: ICD-10-CM

## 2019-07-19 DIAGNOSIS — F03.91 DEMENTIA WITH BEHAVIORAL DISTURBANCE, UNSPECIFIED DEMENTIA TYPE: ICD-10-CM

## 2019-07-19 DIAGNOSIS — A49.9 UTI (URINARY TRACT INFECTION), BACTERIAL: Primary | ICD-10-CM

## 2019-08-12 ENCOUNTER — TELEPHONE (OUTPATIENT)
Dept: PRIMARY CARE CLINIC | Age: 84
End: 2019-08-12

## 2019-08-12 RX ORDER — SULFAMETHOXAZOLE AND TRIMETHOPRIM 800; 160 MG/1; MG/1
1 TABLET ORAL 2 TIMES DAILY
Qty: 20 TABLET | Refills: 0 | Status: SHIPPED | OUTPATIENT
Start: 2019-08-12 | End: 2019-08-22

## 2019-08-16 ENCOUNTER — OFFICE VISIT (OUTPATIENT)
Dept: PRIMARY CARE CLINIC | Age: 84
End: 2019-08-16
Payer: MEDICARE

## 2019-08-16 VITALS
HEART RATE: 86 BPM | TEMPERATURE: 98.5 F | OXYGEN SATURATION: 97 % | HEIGHT: 71 IN | SYSTOLIC BLOOD PRESSURE: 105 MMHG | DIASTOLIC BLOOD PRESSURE: 55 MMHG | BODY MASS INDEX: 23.41 KG/M2 | RESPIRATION RATE: 20 BRPM | WEIGHT: 167.2 LBS

## 2019-08-16 DIAGNOSIS — Z91.81 HISTORY OF FALLING: ICD-10-CM

## 2019-08-16 DIAGNOSIS — R26.2 DIFFICULTY WALKING: ICD-10-CM

## 2019-08-16 DIAGNOSIS — I10 ESSENTIAL HYPERTENSION: Primary | ICD-10-CM

## 2019-08-16 DIAGNOSIS — J44.9 CHRONIC OBSTRUCTIVE PULMONARY DISEASE, UNSPECIFIED COPD TYPE (HCC): ICD-10-CM

## 2019-08-16 DIAGNOSIS — N39.0 UTI (URINARY TRACT INFECTION), BACTERIAL: ICD-10-CM

## 2019-08-16 DIAGNOSIS — A49.9 UTI (URINARY TRACT INFECTION), BACTERIAL: ICD-10-CM

## 2019-08-16 NOTE — PROGRESS NOTES
hospitalizations, surgeries): Unknown lumbar surgery. [facility] October 2013. Known allergies: NKA. Ongoing medical problems: Dementia with behaviors, Depression, HTN, Psychosis, BPH, constipation. Family medical history: Non-contributory. Preventative: Flu vac 11/2018. Feb. Social history: . Was  to CAROLEE for 30 years. No children. Served in [employer] [address] [place] [address] [employer]. Non-smoker, no alcohol. Reg diet. DNR-CCA. PHYSICAL EXAM:    intermediate monthly BP: 148/92  P: 84  Wt. ^ 0.8 lbs. Vitals:    08/16/19 0946   BP: (!) 105/55   Pulse: 86   Resp: 20   Temp: 98.5 °F (36.9 °C)   TempSrc: Tympanic   SpO2: 97%  Comment: 3 lpm   Weight: 167 lb 3.2 oz (75.8 kg)   Height: 5' 11\" (1.803 m)        GEN: elderly male patient sitting in recliner in NAD with easy respirations. HEAD:  atraumatic, normocephalic. EYES:  EOMI, PERRL, L with mod-severe cataract, unable to see with L eye, bilateral conjunctiva normal.   ENT:   Fair hearing, EACs with small wax, TMs nml, nasal septum midline, mild congestion, oral cavity without lesions, edentulous full dentures. NECK:  fair-good ROM, no palpable masses, no carotid bruits, no JVD. LUNGS:  No rales, rhonchi or wheezes. HEART:  RRR, no murmurs or gallops. ABD:  soft, non-tender to palp, no palp masses or HSM, normal BS. BACK: Lumbar scar, no scoliosis or kyphosis, non-tender to palp. EXTREMITIES:  No edema, no ulcerations, varicosities or erythema. No gross deformities. MUSCULOSKELETAL:  limited ROM of all joints, non tender to palp and or with movement. Very decreased AROM L arm, has good passive ROM. SKIN:  No ulcerations or breakdown, rash, ecchymosis, or other lesions. NEURO:  Able to stand with help, walks slowly with walker. No tremor, motor UEs 5/5 LEs  5/5, sensory normal, mild ataxia. PSYCH:  Pleasant and cooperative. Fluid speech, interactive and oriented to person only. No delusional statements.        Meds reviewed Labs: 8/12/19 UA C&S e-coli  7/13/19 UA C&S >100,000 E-Coli   6/12/19   6/11/19 GFR 46, Bun 33  2/13/19 GFR 52, BUN 28, lytes nl, Pro BNP 38 11/7/18 proBNP 53  8/28/18 Gfr 50, lytes nml, ProBNP/ 87.0  6/4/18 CXR/ no definite acute or suspicious process  5/1/18 GFR: 60.3, BUN 15, CR 1.2, Lytes nl, LFTs nl, H/H 12/38, TSH 3.80, Pro BNP 67  3/27/18 CXR/ resolving BL bibasilar infiltrate/pneumonia  3/6/18 CXR/ Patchy bibasilar infiltrate/pneumonia    ASSESSMENT:  Elderly male with has Pulmonary fibrosis ([address] Urinary retention; Dysthymia; Essential hypertension; Unspecified dementia with behavioral disturbance; History of falling; Enlarged prostate without lower urinary tract symptoms (luts); Other constipation; Unspecified chronic bronchitis ([address] History of recurrent pneumonia; Tinea unguium; Peripheral vascular disease, unspecified ([address] Pain in left toe(s); Pain in toe of right foot; Difficulty walking; COPD (chronic obstructive pulmonary disease) ([address] and UTI (urinary tract infection), bacterial on their problem list.     Sidney Jauregui was seen today for urinary tract infection and nausea & vomiting. Diagnoses and all orders for this visit:    Essential hypertension    Chronic obstructive pulmonary disease, unspecified COPD type ([address]    UTI (urinary tract infection), bacterial    History of falling    Difficulty walking         PLAN:  Finish Bactrim for UTI. Continue O2 (2-4 LPM per NC continuous every shift). Use nebulizer txs QID. Monitor BMP and Pro BNP q 3 months next in Sept along with yearly labs. Use walker while ambulating to prevent falls. Continue other meds as per Rx List.   Recheck 1 month. 40 minutes spent on visit, 25 minutes regarding pulmonary, dementia, cardiac, HTN and depression disease processes, treatment options, meds and coordination of care with retirement.     Current Outpatient Medications   Medication Sig Dispense Refill    sulfamethoxazole-trimethoprim (BACTRIM DS;SEPTRA DS) 800-160

## 2019-08-28 RX ORDER — TAMSULOSIN HYDROCHLORIDE 0.4 MG/1
CAPSULE ORAL
Qty: 30 CAPSULE | Refills: 10 | Status: SHIPPED
Start: 2019-08-28 | End: 2020-01-01

## 2019-08-28 RX ORDER — MIRTAZAPINE 15 MG/1
TABLET, FILM COATED ORAL
Qty: 31 TABLET | Refills: 10 | Status: SHIPPED
Start: 2019-08-28 | End: 2020-01-01

## 2019-08-30 RX ORDER — RIVASTIGMINE 4.6 MG/24H
1 PATCH, EXTENDED RELEASE TRANSDERMAL DAILY
Qty: 30 PATCH | Refills: 11 | Status: SHIPPED | OUTPATIENT
Start: 2019-08-30 | End: 2020-01-01

## 2019-09-16 ENCOUNTER — OFFICE VISIT (OUTPATIENT)
Dept: PRIMARY CARE CLINIC | Age: 84
End: 2019-09-16
Payer: MEDICARE

## 2019-09-16 VITALS
RESPIRATION RATE: 18 BRPM | HEART RATE: 83 BPM | BODY MASS INDEX: 22.2 KG/M2 | OXYGEN SATURATION: 98 % | HEIGHT: 71 IN | DIASTOLIC BLOOD PRESSURE: 55 MMHG | TEMPERATURE: 97.7 F | WEIGHT: 158.6 LBS | SYSTOLIC BLOOD PRESSURE: 105 MMHG

## 2019-09-16 DIAGNOSIS — R26.2 DIFFICULTY WALKING: ICD-10-CM

## 2019-09-16 DIAGNOSIS — J44.9 CHRONIC OBSTRUCTIVE PULMONARY DISEASE, UNSPECIFIED COPD TYPE (HCC): ICD-10-CM

## 2019-09-16 DIAGNOSIS — F03.91 DEMENTIA WITH BEHAVIORAL DISTURBANCE, UNSPECIFIED DEMENTIA TYPE: ICD-10-CM

## 2019-09-16 DIAGNOSIS — I10 ESSENTIAL HYPERTENSION: Primary | ICD-10-CM

## 2019-09-16 DIAGNOSIS — Z91.81 HISTORY OF FALLING: ICD-10-CM

## 2019-09-16 DIAGNOSIS — N18.9 CHRONIC KIDNEY DISEASE, UNSPECIFIED CKD STAGE: ICD-10-CM

## 2019-09-16 DIAGNOSIS — R33.9 URINARY RETENTION: ICD-10-CM

## 2019-09-16 NOTE — PROGRESS NOTES
Ongoing evaluation of chronic medical problems. This patient has Pulmonary fibrosis ([address] Urinary retention; Dysthymia; Essential hypertension; Unspecified dementia with behavioral disturbance; History of falling; Enlarged prostate without lower urinary tract symptoms (luts); Other constipation; Unspecified chronic bronchitis ([address] History of recurrent pneumonia; Tinea unguium; Peripheral vascular disease, unspecified ([address] Pain in left toe(s); Pain in toe of right foot; Difficulty walking; COPD (chronic obstructive pulmonary disease) ([address] UTI (urinary tract infection), bacterial; and Chronic kidney disease on their problem list.     Home visit medically necessary in lieu of an office visit due to: halfway resident, bedbound, dementia, difficult to get out. HPI:  No recent urinary infections. Today he says he feels good. No URI/cough or wheezing. Receives regular breathing treatments. O2 sat on 3 LPM at 98%. Denies nausea or abdominal pain. He walks with his walker. No recent falls. No behaviors. Generally, pleasant disposition. REVIEW OF SYSTEMS:   GENERAL: Appetite good. Gradual  weight gain, generally healthy, no change in strength or exercise tolerance. CAN'T SEE MUCH OUT OF L EYE. CARDIOVASCULAR: No edema, no chest pains, no murmurs, no palpitations, no syncope, no orthopnea. RESPIRATORY: no pain with breathing, no wheezing, no hemoptysis, no cough. GASTROINTESTINAL: No change in appetite, no dysphagia, no heartburn, no abdominal pains, no constipation or diarrhea, no bowel habit changes, no emesis, no melena, no hemorrhoids. GENITOURINARY: No incontinence or retention, no urinary urgency, no nocturia, no frequent UTIs, no dysuria, no change in nature of urine. MUSCULOSKELETAL: No pain in muscles or joints, no swelling or redness of joints, no limitation of range of motion, no weakness or numbness. All other systems negative.        PAST MEDICAL HISTORY:  (Major events,

## 2019-09-17 LAB
ALBUMIN SERPL-MCNC: 4 G/DL
ALP BLD-CCNC: 86 U/L
ALT SERPL-CCNC: 13 U/L
ANION GAP SERPL CALCULATED.3IONS-SCNC: ABNORMAL MMOL/L
AST SERPL-CCNC: 22 U/L
B-TYPE NATRIURETIC PEPTIDE: 79 PG/ML
BASOPHILS ABSOLUTE: ABNORMAL /ΜL
BASOPHILS RELATIVE PERCENT: ABNORMAL %
BILIRUB SERPL-MCNC: 0.3 MG/DL (ref 0.1–1.4)
BUN BLDV-MCNC: 26 MG/DL
CALCIUM SERPL-MCNC: 9.4 MG/DL
CHLORIDE BLD-SCNC: 107 MMOL/L
CO2: 29 MMOL/L
CREAT SERPL-MCNC: 1.4 MG/DL
EOSINOPHILS ABSOLUTE: ABNORMAL /ΜL
EOSINOPHILS RELATIVE PERCENT: ABNORMAL %
GFR CALCULATED: 52.49
GLUCOSE BLD-MCNC: 95 MG/DL
HCT VFR BLD CALC: 36.7 % (ref 41–53)
HEMOGLOBIN: 11.9 G/DL (ref 13.5–17.5)
LYMPHOCYTES ABSOLUTE: ABNORMAL /ΜL
LYMPHOCYTES RELATIVE PERCENT: ABNORMAL %
MCH RBC QN AUTO: 30.7 PG
MCHC RBC AUTO-ENTMCNC: 32.5 G/DL
MCV RBC AUTO: 94.3 FL
MONOCYTES ABSOLUTE: ABNORMAL /ΜL
MONOCYTES RELATIVE PERCENT: ABNORMAL %
NEUTROPHILS ABSOLUTE: ABNORMAL /ΜL
NEUTROPHILS RELATIVE PERCENT: ABNORMAL %
PLATELET # BLD: 159 K/ΜL
PMV BLD AUTO: ABNORMAL FL
POTASSIUM SERPL-SCNC: 4.5 MMOL/L
RBC # BLD: 3.89 10^6/ΜL
SODIUM BLD-SCNC: 142 MMOL/L
TOTAL PROTEIN: 6.8
TSH SERPL DL<=0.05 MIU/L-ACNC: 3.34 UIU/ML
WBC # BLD: 8.41 10^3/ML

## 2019-09-19 DIAGNOSIS — I10 ESSENTIAL HYPERTENSION: ICD-10-CM

## 2019-09-19 DIAGNOSIS — N18.9 CHRONIC KIDNEY DISEASE, UNSPECIFIED CKD STAGE: ICD-10-CM

## 2019-09-19 DIAGNOSIS — R33.9 URINARY RETENTION: ICD-10-CM

## 2019-09-21 ENCOUNTER — OUTSIDE SERVICES (OUTPATIENT)
Dept: PODIATRY | Age: 84
End: 2019-09-21
Payer: MEDICARE

## 2019-09-21 DIAGNOSIS — M79.674 PAIN IN TOE OF RIGHT FOOT: ICD-10-CM

## 2019-09-21 DIAGNOSIS — R26.2 DIFFICULTY WALKING: ICD-10-CM

## 2019-09-21 DIAGNOSIS — B35.1 TINEA UNGUIUM: Primary | ICD-10-CM

## 2019-09-21 DIAGNOSIS — I73.9 PERIPHERAL VASCULAR DISEASE, UNSPECIFIED (HCC): ICD-10-CM

## 2019-09-21 DIAGNOSIS — M79.675 PAIN IN LEFT TOE(S): ICD-10-CM

## 2019-09-21 PROCEDURE — 11721 DEBRIDE NAIL 6 OR MORE: CPT | Performed by: PODIATRIST

## 2019-09-27 ENCOUNTER — OFFICE VISIT (OUTPATIENT)
Dept: PRIMARY CARE CLINIC | Age: 84
End: 2019-09-27
Payer: MEDICARE

## 2019-09-27 VITALS
BODY MASS INDEX: 22.2 KG/M2 | RESPIRATION RATE: 20 BRPM | DIASTOLIC BLOOD PRESSURE: 76 MMHG | HEART RATE: 92 BPM | WEIGHT: 158.6 LBS | OXYGEN SATURATION: 93 % | TEMPERATURE: 98.6 F | SYSTOLIC BLOOD PRESSURE: 130 MMHG | HEIGHT: 71 IN

## 2019-09-27 DIAGNOSIS — B02.9 HERPES ZOSTER WITHOUT COMPLICATION: Primary | ICD-10-CM

## 2019-09-27 PROCEDURE — 1123F ACP DISCUSS/DSCN MKR DOCD: CPT | Performed by: PHYSICIAN ASSISTANT

## 2019-09-27 PROCEDURE — G8420 CALC BMI NORM PARAMETERS: HCPCS | Performed by: PHYSICIAN ASSISTANT

## 2019-09-27 RX ORDER — VALACYCLOVIR HYDROCHLORIDE 1 G/1
1000 TABLET, FILM COATED ORAL 3 TIMES DAILY
Qty: 21 TABLET | Refills: 0 | Status: SHIPPED | OUTPATIENT
Start: 2019-09-27 | End: 2019-09-27 | Stop reason: SDUPTHER

## 2019-09-30 DIAGNOSIS — R26.2 DIFFICULTY WALKING: ICD-10-CM

## 2019-09-30 DIAGNOSIS — R33.9 URINARY RETENTION: ICD-10-CM

## 2019-09-30 DIAGNOSIS — J44.9 CHRONIC OBSTRUCTIVE PULMONARY DISEASE, UNSPECIFIED COPD TYPE (HCC): ICD-10-CM

## 2019-09-30 DIAGNOSIS — Z91.81 HISTORY OF FALLING: ICD-10-CM

## 2019-09-30 DIAGNOSIS — F03.91 DEMENTIA WITH BEHAVIORAL DISTURBANCE, UNSPECIFIED DEMENTIA TYPE: ICD-10-CM

## 2019-09-30 DIAGNOSIS — N18.9 CHRONIC KIDNEY DISEASE, UNSPECIFIED CKD STAGE: ICD-10-CM

## 2019-09-30 DIAGNOSIS — I10 ESSENTIAL HYPERTENSION: ICD-10-CM

## 2019-10-07 PROCEDURE — G0008 ADMIN INFLUENZA VIRUS VAC: HCPCS | Performed by: FAMILY MEDICINE

## 2019-10-07 PROCEDURE — 90653 IIV ADJUVANT VACCINE IM: CPT | Performed by: FAMILY MEDICINE

## 2019-10-18 RX ORDER — IPRATROPIUM BROMIDE AND ALBUTEROL SULFATE 2.5; .5 MG/3ML; MG/3ML
SOLUTION RESPIRATORY (INHALATION)
Qty: 360 ML | Refills: 11 | Status: SHIPPED
Start: 2019-10-18 | End: 2020-01-01

## 2019-10-25 ENCOUNTER — TELEPHONE (OUTPATIENT)
Dept: PRIMARY CARE CLINIC | Age: 84
End: 2019-10-25

## 2019-10-25 DIAGNOSIS — N30.00 ACUTE CYSTITIS WITHOUT HEMATURIA: Primary | ICD-10-CM

## 2019-10-25 RX ORDER — CEFDINIR 300 MG/1
300 CAPSULE ORAL 2 TIMES DAILY
Qty: 14 CAPSULE | Refills: 0 | Status: SHIPPED | OUTPATIENT
Start: 2019-10-25 | End: 2019-11-01

## 2019-11-01 ENCOUNTER — OFFICE VISIT (OUTPATIENT)
Dept: PRIMARY CARE CLINIC | Age: 84
End: 2019-11-01
Payer: MEDICARE

## 2019-11-01 VITALS
SYSTOLIC BLOOD PRESSURE: 109 MMHG | DIASTOLIC BLOOD PRESSURE: 63 MMHG | OXYGEN SATURATION: 98 % | HEART RATE: 80 BPM | BODY MASS INDEX: 22.25 KG/M2 | HEIGHT: 71 IN | WEIGHT: 158.9 LBS | TEMPERATURE: 97.1 F | RESPIRATION RATE: 18 BRPM

## 2019-11-01 DIAGNOSIS — Z91.81 HISTORY OF FALLING: ICD-10-CM

## 2019-11-01 DIAGNOSIS — N39.0 UTI (URINARY TRACT INFECTION), BACTERIAL: ICD-10-CM

## 2019-11-01 DIAGNOSIS — I10 ESSENTIAL HYPERTENSION: Primary | ICD-10-CM

## 2019-11-01 DIAGNOSIS — F03.91 DEMENTIA WITH BEHAVIORAL DISTURBANCE, UNSPECIFIED DEMENTIA TYPE: ICD-10-CM

## 2019-11-01 DIAGNOSIS — A49.9 UTI (URINARY TRACT INFECTION), BACTERIAL: ICD-10-CM

## 2019-11-01 DIAGNOSIS — J44.9 CHRONIC OBSTRUCTIVE PULMONARY DISEASE, UNSPECIFIED COPD TYPE (HCC): ICD-10-CM

## 2019-11-01 PROCEDURE — 81003 URINALYSIS AUTO W/O SCOPE: CPT | Performed by: PHYSICIAN ASSISTANT

## 2019-11-01 PROCEDURE — G8420 CALC BMI NORM PARAMETERS: HCPCS | Performed by: PHYSICIAN ASSISTANT

## 2019-11-01 PROCEDURE — G8482 FLU IMMUNIZE ORDER/ADMIN: HCPCS | Performed by: PHYSICIAN ASSISTANT

## 2019-11-01 PROCEDURE — 1123F ACP DISCUSS/DSCN MKR DOCD: CPT | Performed by: PHYSICIAN ASSISTANT

## 2019-11-04 LAB
BILIRUBIN, URINE: NEGATIVE
BLOOD, URINE: NEGATIVE
CLARITY: ABNORMAL
COLOR: YELLOW
GLUCOSE URINE: NEGATIVE
KETONES, URINE: NEGATIVE
LEUKOCYTE ESTERASE, URINE: POSITIVE
NITRITE, URINE: NEGATIVE
PH UA: 6 (ref 4.5–8)
PROTEIN UA: NEGATIVE
SPECIFIC GRAVITY, URINE: 1.01
UROBILINOGEN, URINE: ABNORMAL

## 2019-11-06 ENCOUNTER — TELEPHONE (OUTPATIENT)
Dept: PRIMARY CARE CLINIC | Age: 84
End: 2019-11-06

## 2019-11-06 DIAGNOSIS — A49.9 UTI (URINARY TRACT INFECTION), BACTERIAL: ICD-10-CM

## 2019-11-06 DIAGNOSIS — N39.0 UTI (URINARY TRACT INFECTION), BACTERIAL: ICD-10-CM

## 2019-11-06 PROCEDURE — 81003 URINALYSIS AUTO W/O SCOPE: CPT | Performed by: PHYSICIAN ASSISTANT

## 2019-11-06 RX ORDER — CEPHALEXIN 500 MG/1
500 CAPSULE ORAL 3 TIMES DAILY
Qty: 30 CAPSULE | Refills: 0 | Status: SHIPPED | OUTPATIENT
Start: 2019-11-06 | End: 2019-11-16

## 2019-12-01 PROBLEM — A49.9 UTI (URINARY TRACT INFECTION), BACTERIAL: Status: RESOLVED | Noted: 2019-07-19 | Resolved: 2019-01-01

## 2019-12-01 PROBLEM — N39.0 UTI (URINARY TRACT INFECTION), BACTERIAL: Status: RESOLVED | Noted: 2019-07-19 | Resolved: 2019-01-01

## 2020-01-01 ENCOUNTER — APPOINTMENT (OUTPATIENT)
Dept: GENERAL RADIOLOGY | Age: 85
DRG: 871 | End: 2020-01-01
Payer: MEDICARE

## 2020-01-01 ENCOUNTER — OFFICE VISIT (OUTPATIENT)
Dept: PRIMARY CARE CLINIC | Age: 85
End: 2020-01-01
Payer: MEDICARE

## 2020-01-01 ENCOUNTER — HOSPITAL ENCOUNTER (INPATIENT)
Age: 85
LOS: 7 days | DRG: 871 | End: 2020-11-26
Attending: EMERGENCY MEDICINE | Admitting: INTERNAL MEDICINE
Payer: MEDICARE

## 2020-01-01 ENCOUNTER — TELEPHONE (OUTPATIENT)
Dept: PRIMARY CARE CLINIC | Age: 85
End: 2020-01-01

## 2020-01-01 ENCOUNTER — APPOINTMENT (OUTPATIENT)
Dept: CT IMAGING | Age: 85
DRG: 871 | End: 2020-01-01
Payer: MEDICARE

## 2020-01-01 ENCOUNTER — OUTSIDE SERVICES (OUTPATIENT)
Dept: PODIATRY | Age: 85
End: 2020-01-01
Payer: MEDICARE

## 2020-01-01 VITALS
HEIGHT: 67 IN | SYSTOLIC BLOOD PRESSURE: 102 MMHG | RESPIRATION RATE: 18 BRPM | HEART RATE: 84 BPM | OXYGEN SATURATION: 98 % | WEIGHT: 161.8 LBS | BODY MASS INDEX: 25.39 KG/M2 | DIASTOLIC BLOOD PRESSURE: 59 MMHG | TEMPERATURE: 98.6 F

## 2020-01-01 VITALS
DIASTOLIC BLOOD PRESSURE: 66 MMHG | HEART RATE: 91 BPM | TEMPERATURE: 96.9 F | SYSTOLIC BLOOD PRESSURE: 122 MMHG | HEIGHT: 67 IN | RESPIRATION RATE: 24 BRPM | OXYGEN SATURATION: 91 % | WEIGHT: 157.8 LBS | BODY MASS INDEX: 24.77 KG/M2

## 2020-01-01 VITALS
WEIGHT: 157.8 LBS | HEART RATE: 85 BPM | OXYGEN SATURATION: 98 % | TEMPERATURE: 98 F | DIASTOLIC BLOOD PRESSURE: 58 MMHG | RESPIRATION RATE: 20 BRPM | HEIGHT: 67 IN | BODY MASS INDEX: 24.77 KG/M2 | SYSTOLIC BLOOD PRESSURE: 105 MMHG

## 2020-01-01 VITALS
HEART RATE: 83 BPM | BODY MASS INDEX: 25.39 KG/M2 | DIASTOLIC BLOOD PRESSURE: 79 MMHG | WEIGHT: 161.8 LBS | RESPIRATION RATE: 18 BRPM | SYSTOLIC BLOOD PRESSURE: 129 MMHG | HEIGHT: 67 IN | TEMPERATURE: 97.5 F | OXYGEN SATURATION: 98 %

## 2020-01-01 VITALS
TEMPERATURE: 98.7 F | HEIGHT: 67 IN | WEIGHT: 164 LBS | HEART RATE: 95 BPM | RESPIRATION RATE: 20 BRPM | SYSTOLIC BLOOD PRESSURE: 116 MMHG | BODY MASS INDEX: 25.74 KG/M2 | OXYGEN SATURATION: 100 % | DIASTOLIC BLOOD PRESSURE: 75 MMHG

## 2020-01-01 VITALS
HEIGHT: 67 IN | TEMPERATURE: 96.9 F | DIASTOLIC BLOOD PRESSURE: 51 MMHG | BODY MASS INDEX: 24.89 KG/M2 | SYSTOLIC BLOOD PRESSURE: 107 MMHG | RESPIRATION RATE: 18 BRPM | HEART RATE: 81 BPM | OXYGEN SATURATION: 98 % | WEIGHT: 158.6 LBS

## 2020-01-01 VITALS
WEIGHT: 157 LBS | HEART RATE: 88 BPM | OXYGEN SATURATION: 93 % | RESPIRATION RATE: 18 BRPM | BODY MASS INDEX: 24.64 KG/M2 | SYSTOLIC BLOOD PRESSURE: 130 MMHG | TEMPERATURE: 98.1 F | DIASTOLIC BLOOD PRESSURE: 70 MMHG | HEIGHT: 67 IN

## 2020-01-01 VITALS
TEMPERATURE: 96.5 F | WEIGHT: 161.4 LBS | OXYGEN SATURATION: 98 % | SYSTOLIC BLOOD PRESSURE: 113 MMHG | RESPIRATION RATE: 18 BRPM | HEIGHT: 67 IN | DIASTOLIC BLOOD PRESSURE: 69 MMHG | HEART RATE: 81 BPM | BODY MASS INDEX: 25.33 KG/M2

## 2020-01-01 VITALS
SYSTOLIC BLOOD PRESSURE: 72 MMHG | DIASTOLIC BLOOD PRESSURE: 44 MMHG | HEIGHT: 67 IN | RESPIRATION RATE: 26 BRPM | WEIGHT: 159.4 LBS | TEMPERATURE: 96.8 F | OXYGEN SATURATION: 100 % | BODY MASS INDEX: 25.02 KG/M2 | HEART RATE: 89 BPM

## 2020-01-01 VITALS
DIASTOLIC BLOOD PRESSURE: 61 MMHG | TEMPERATURE: 96.8 F | RESPIRATION RATE: 18 BRPM | HEIGHT: 67 IN | HEART RATE: 64 BPM | OXYGEN SATURATION: 99 % | WEIGHT: 165.4 LBS | SYSTOLIC BLOOD PRESSURE: 102 MMHG | BODY MASS INDEX: 25.96 KG/M2

## 2020-01-01 VITALS
WEIGHT: 162.6 LBS | BODY MASS INDEX: 25.52 KG/M2 | DIASTOLIC BLOOD PRESSURE: 57 MMHG | OXYGEN SATURATION: 98 % | HEART RATE: 84 BPM | SYSTOLIC BLOOD PRESSURE: 106 MMHG | RESPIRATION RATE: 18 BRPM | HEIGHT: 67 IN | TEMPERATURE: 97.9 F

## 2020-01-01 VITALS
DIASTOLIC BLOOD PRESSURE: 61 MMHG | BODY MASS INDEX: 25.52 KG/M2 | TEMPERATURE: 97.8 F | RESPIRATION RATE: 22 BRPM | OXYGEN SATURATION: 99 % | WEIGHT: 162.6 LBS | SYSTOLIC BLOOD PRESSURE: 125 MMHG | HEART RATE: 91 BPM | HEIGHT: 67 IN

## 2020-01-01 LAB
(1,3)-BETA-D-GLUCAN (FUNGITELL) INTERPRETATION: NEGATIVE
(1,3)-BETA-D-GLUCAN (FUNGITELL): <31 PG/ML
ACANTHOCYTES: ABNORMAL
ADDENDUM ELECTROPHORESIS URINE RANDOM: NORMAL
ADENOVIRUS BY PCR: NOT DETECTED
ADENOVIRUS BY PCR: NOT DETECTED
ALBUMIN SERPL-MCNC: 2.3 G/DL (ref 3.5–5.2)
ALBUMIN SERPL-MCNC: 2.4 G/DL (ref 3.5–5.2)
ALBUMIN SERPL-MCNC: 2.5 G/DL (ref 3.5–5.2)
ALBUMIN SERPL-MCNC: 2.6 G/DL (ref 3.5–5.2)
ALBUMIN SERPL-MCNC: 3.1 G/DL (ref 3.5–5.2)
ALBUMIN SERPL-MCNC: 3.8 G/DL
ALBUMIN SERPL-MCNC: 4.5 G/DL (ref 3.5–5.2)
ALBUMIN SERPL-MCNC: 5.1 G/DL (ref 3.5–5.2)
ALP BLD-CCNC: 157 U/L (ref 40–129)
ALP BLD-CCNC: 183 U/L (ref 40–129)
ALP BLD-CCNC: 217 U/L (ref 40–129)
ALP BLD-CCNC: 246 U/L (ref 40–129)
ALP BLD-CCNC: 292 U/L (ref 40–129)
ALP BLD-CCNC: 347 U/L (ref 40–129)
ALP BLD-CCNC: 393 U/L (ref 40–129)
ALP BLD-CCNC: 87 U/L
ALT SERPL-CCNC: 106 U/L (ref 0–40)
ALT SERPL-CCNC: 11 U/L
ALT SERPL-CCNC: 174 U/L (ref 0–40)
ALT SERPL-CCNC: 210 U/L (ref 0–40)
ALT SERPL-CCNC: 55 U/L (ref 0–40)
ALT SERPL-CCNC: 57 U/L (ref 0–40)
ALT SERPL-CCNC: 68 U/L (ref 0–40)
ALT SERPL-CCNC: 95 U/L (ref 0–40)
AMORPHOUS: ABNORMAL
ANION GAP SERPL CALCULATED.3IONS-SCNC: 10 MMOL/L (ref 7–16)
ANION GAP SERPL CALCULATED.3IONS-SCNC: 11 MMOL/L (ref 7–16)
ANION GAP SERPL CALCULATED.3IONS-SCNC: 12 MMOL/L (ref 7–16)
ANION GAP SERPL CALCULATED.3IONS-SCNC: 21 MMOL/L (ref 7–16)
ANION GAP SERPL CALCULATED.3IONS-SCNC: 5 MMOL/L (ref 7–16)
ANION GAP SERPL CALCULATED.3IONS-SCNC: 6 MMOL/L (ref 7–16)
ANION GAP SERPL CALCULATED.3IONS-SCNC: 7 MMOL/L (ref 7–16)
ANION GAP SERPL CALCULATED.3IONS-SCNC: 8 MMOL/L (ref 7–16)
ANION GAP SERPL CALCULATED.3IONS-SCNC: 9 MMOL/L (ref 7–16)
ANION GAP SERPL CALCULATED.3IONS-SCNC: ABNORMAL MMOL/L
ANISOCYTOSIS: ABNORMAL
APTT: 21.9 SEC (ref 24.5–35.1)
AST SERPL-CCNC: 112 U/L (ref 0–39)
AST SERPL-CCNC: 22 U/L
AST SERPL-CCNC: 274 U/L (ref 0–39)
AST SERPL-CCNC: 404 U/L (ref 0–39)
AST SERPL-CCNC: 50 U/L (ref 0–39)
AST SERPL-CCNC: 56 U/L (ref 0–39)
AST SERPL-CCNC: 64 U/L (ref 0–39)
AST SERPL-CCNC: 71 U/L (ref 0–39)
B-TYPE NATRIURETIC PEPTIDE: 117 PG/ML
B-TYPE NATRIURETIC PEPTIDE: 69 PG/ML
B-TYPE NATRIURETIC PEPTIDE: 82 PG/ML
B-TYPE NATRIURETIC PEPTIDE: 88 PG/ML
B.E.: -4.1 MMOL/L (ref -3–3)
B.E.: -5.6 MMOL/L (ref -3–3)
B.E.: -6.4 MMOL/L (ref -3–3)
B.E.: -7.1 MMOL/L (ref -3–3)
B.E.: -7.4 MMOL/L (ref -3–3)
BACTERIA: ABNORMAL /HPF
BASOPHILIC STIPPLING: ABNORMAL
BASOPHILIC STIPPLING: ABNORMAL
BASOPHILS ABSOLUTE: 0 E9/L (ref 0–0.2)
BASOPHILS ABSOLUTE: 0.02 E9/L (ref 0–0.2)
BASOPHILS ABSOLUTE: 0.03 /ΜL
BASOPHILS ABSOLUTE: 0.05 E9/L (ref 0–0.2)
BASOPHILS RELATIVE PERCENT: 0 % (ref 0–2)
BASOPHILS RELATIVE PERCENT: 0 % (ref 0–2)
BASOPHILS RELATIVE PERCENT: 0.1 % (ref 0–2)
BASOPHILS RELATIVE PERCENT: 0.2 % (ref 0–2)
BASOPHILS RELATIVE PERCENT: 0.3 %
BASOPHILS RELATIVE PERCENT: 0.3 % (ref 0–2)
BILIRUB SERPL-MCNC: 0.4 MG/DL (ref 0.1–1.4)
BILIRUB SERPL-MCNC: 0.4 MG/DL (ref 0–1.2)
BILIRUB SERPL-MCNC: 0.4 MG/DL (ref 0–1.2)
BILIRUB SERPL-MCNC: 0.5 MG/DL (ref 0–1.2)
BILIRUB SERPL-MCNC: 0.6 MG/DL (ref 0–1.2)
BILIRUB SERPL-MCNC: 0.6 MG/DL (ref 0–1.2)
BILIRUB SERPL-MCNC: 0.9 MG/DL (ref 0–1.2)
BILIRUB SERPL-MCNC: 1.2 MG/DL (ref 0–1.2)
BILIRUBIN DIRECT: 0.4 MG/DL (ref 0–0.3)
BILIRUBIN URINE: ABNORMAL
BILIRUBIN, INDIRECT: 0.2 MG/DL (ref 0–1)
BILIRUBIN, URINE: NEGATIVE
BLOOD CULTURE, ROUTINE: NORMAL
BLOOD CULTURE, ROUTINE: NORMAL
BLOOD, URINE: ABNORMAL
BLOOD, URINE: POSITIVE
BORDETELLA PARAPERTUSSIS BY PCR: NOT DETECTED
BORDETELLA PARAPERTUSSIS BY PCR: NOT DETECTED
BORDETELLA PERTUSSIS BY PCR: NOT DETECTED
BORDETELLA PERTUSSIS BY PCR: NOT DETECTED
BUN BLDV-MCNC: 20 MG/DL
BUN BLDV-MCNC: 22 MG/DL
BUN BLDV-MCNC: 23 MG/DL
BUN BLDV-MCNC: 27 MG/DL
BUN BLDV-MCNC: 28 MG/DL (ref 8–23)
BUN BLDV-MCNC: 29 MG/DL (ref 8–23)
BUN BLDV-MCNC: 37 MG/DL (ref 8–23)
BUN BLDV-MCNC: 38 MG/DL (ref 8–23)
BUN BLDV-MCNC: 41 MG/DL (ref 8–23)
BUN BLDV-MCNC: 47 MG/DL (ref 8–23)
BUN BLDV-MCNC: 49 MG/DL (ref 8–23)
BUN BLDV-MCNC: 51 MG/DL (ref 8–23)
BUN BLDV-MCNC: 51 MG/DL (ref 8–23)
BUN BLDV-MCNC: 54 MG/DL (ref 8–23)
BUN BLDV-MCNC: 63 MG/DL (ref 8–23)
BUN BLDV-MCNC: 64 MG/DL (ref 8–23)
CALCIUM IONIZED: 1.15 MMOL/L (ref 1.15–1.33)
CALCIUM IONIZED: 1.17 MMOL/L (ref 1.15–1.33)
CALCIUM SERPL-MCNC: 6.3 MG/DL (ref 8.6–10.2)
CALCIUM SERPL-MCNC: 7.3 MG/DL (ref 8.6–10.2)
CALCIUM SERPL-MCNC: 7.3 MG/DL (ref 8.6–10.2)
CALCIUM SERPL-MCNC: 7.4 MG/DL (ref 8.6–10.2)
CALCIUM SERPL-MCNC: 7.4 MG/DL (ref 8.6–10.2)
CALCIUM SERPL-MCNC: 7.5 MG/DL (ref 8.6–10.2)
CALCIUM SERPL-MCNC: 7.6 MG/DL (ref 8.6–10.2)
CALCIUM SERPL-MCNC: 8.3 MG/DL (ref 8.6–10.2)
CALCIUM SERPL-MCNC: 8.4 MG/DL (ref 8.6–10.2)
CALCIUM SERPL-MCNC: 8.4 MG/DL (ref 8.6–10.2)
CALCIUM SERPL-MCNC: 8.7 MG/DL
CALCIUM SERPL-MCNC: 8.9 MG/DL
CALCIUM SERPL-MCNC: 9.2 MG/DL
CALCIUM SERPL-MCNC: 9.3 MG/DL
CHLAMYDOPHILIA PNEUMONIAE BY PCR: NOT DETECTED
CHLAMYDOPHILIA PNEUMONIAE BY PCR: NOT DETECTED
CHLORIDE BLD-SCNC: 103 MMOL/L
CHLORIDE BLD-SCNC: 104 MMOL/L
CHLORIDE BLD-SCNC: 105 MMOL/L
CHLORIDE BLD-SCNC: 106 MMOL/L (ref 98–107)
CHLORIDE BLD-SCNC: 107 MMOL/L
CHLORIDE BLD-SCNC: 108 MMOL/L (ref 98–107)
CHLORIDE BLD-SCNC: 111 MMOL/L (ref 98–107)
CHLORIDE BLD-SCNC: 112 MMOL/L (ref 98–107)
CHLORIDE BLD-SCNC: 117 MMOL/L (ref 98–107)
CHLORIDE BLD-SCNC: 118 MMOL/L (ref 98–107)
CHLORIDE BLD-SCNC: 123 MMOL/L (ref 98–107)
CHLORIDE BLD-SCNC: 125 MMOL/L (ref 98–107)
CHLORIDE BLD-SCNC: 126 MMOL/L (ref 98–107)
CHLORIDE BLD-SCNC: 126 MMOL/L (ref 98–107)
CHLORIDE BLD-SCNC: 127 MMOL/L (ref 98–107)
CHLORIDE BLD-SCNC: 130 MMOL/L (ref 98–107)
CHP ED QC CHECK: YES
CHP ED QC CHECK: YES
CK MB: 10.4 NG/ML (ref 0–7.7)
CK MB: 10.6 NG/ML (ref 0–7.7)
CLARITY: ABNORMAL
CLARITY: CLEAR
CO2: 19 MMOL/L (ref 22–29)
CO2: 20 MMOL/L (ref 22–29)
CO2: 23 MMOL/L (ref 22–29)
CO2: 24 MMOL/L (ref 22–29)
CO2: 25 MMOL/L (ref 22–29)
CO2: 25 MMOL/L (ref 22–29)
CO2: 29 MMOL/L
COHB: 0 % (ref 0–1.5)
COHB: 0.1 % (ref 0–1.5)
COHB: 0.1 % (ref 0–1.5)
COHB: 0.2 % (ref 0–1.5)
COHB: 0.3 % (ref 0–1.5)
COLOR: ABNORMAL
COLOR: ABNORMAL
COMMENT: ABNORMAL
CORONAVIRUS 229E BY PCR: NOT DETECTED
CORONAVIRUS 229E BY PCR: NOT DETECTED
CORONAVIRUS HKU1 BY PCR: NOT DETECTED
CORONAVIRUS HKU1 BY PCR: NOT DETECTED
CORONAVIRUS NL63 BY PCR: NOT DETECTED
CORONAVIRUS NL63 BY PCR: NOT DETECTED
CORONAVIRUS OC43 BY PCR: NOT DETECTED
CORONAVIRUS OC43 BY PCR: NOT DETECTED
CREAT SERPL-MCNC: 1.2 MG/DL
CREAT SERPL-MCNC: 1.2 MG/DL (ref 0.7–1.2)
CREAT SERPL-MCNC: 1.2 MG/DL (ref 0.7–1.2)
CREAT SERPL-MCNC: 1.3 MG/DL
CREAT SERPL-MCNC: 1.4 MG/DL (ref 0.7–1.2)
CREAT SERPL-MCNC: 1.5 MG/DL (ref 0.7–1.2)
CREAT SERPL-MCNC: 1.5 MG/DL (ref 0.7–1.2)
CREAT SERPL-MCNC: 1.9 MG/DL (ref 0.7–1.2)
CREAT SERPL-MCNC: 2 MG/DL (ref 0.7–1.2)
CREAT SERPL-MCNC: 2.1 MG/DL (ref 0.7–1.2)
CREAT SERPL-MCNC: 2.1 MG/DL (ref 0.7–1.2)
CREAT SERPL-MCNC: 2.3 MG/DL (ref 0.7–1.2)
CREAT SERPL-MCNC: 2.5 MG/DL (ref 0.7–1.2)
CREAT SERPL-MCNC: 2.6 MG/DL (ref 0.7–1.2)
CRITICAL: ABNORMAL
CRYSTALS, UA: ABNORMAL /HPF
CULTURE, BLOOD 2: NORMAL
D DIMER: 815 NG/ML DDU
DATE ANALYZED: ABNORMAL
DATE OF COLLECTION: ABNORMAL
EKG ATRIAL RATE: 143 BPM
EKG ATRIAL RATE: 96 BPM
EKG P AXIS: 43 DEGREES
EKG P AXIS: 47 DEGREES
EKG P-R INTERVAL: 136 MS
EKG P-R INTERVAL: 146 MS
EKG Q-T INTERVAL: 290 MS
EKG Q-T INTERVAL: 370 MS
EKG QRS DURATION: 58 MS
EKG QRS DURATION: 68 MS
EKG QTC CALCULATION (BAZETT): 456 MS
EKG QTC CALCULATION (BAZETT): 467 MS
EKG R AXIS: 29 DEGREES
EKG R AXIS: 45 DEGREES
EKG T AXIS: 40 DEGREES
EKG T AXIS: 41 DEGREES
EKG VENTRICULAR RATE: 149 BPM
EKG VENTRICULAR RATE: 96 BPM
EOSINOPHILS ABSOLUTE: 0 E9/L (ref 0.05–0.5)
EOSINOPHILS ABSOLUTE: 0.01 E9/L (ref 0.05–0.5)
EOSINOPHILS ABSOLUTE: 0.1 E9/L (ref 0.05–0.5)
EOSINOPHILS ABSOLUTE: 0.7 /ΜL
EOSINOPHILS RELATIVE PERCENT: 0 % (ref 0–6)
EOSINOPHILS RELATIVE PERCENT: 0.1 % (ref 0–6)
EOSINOPHILS RELATIVE PERCENT: 0.2 % (ref 0–6)
EOSINOPHILS RELATIVE PERCENT: 0.3 % (ref 0–6)
EOSINOPHILS RELATIVE PERCENT: 0.6 % (ref 0–6)
EOSINOPHILS RELATIVE PERCENT: 8.7 %
FIO2: 100 %
FOLATE: 7.8 NG/ML (ref 4.8–24.2)
GFR AFRICAN AMERICAN: 28
GFR AFRICAN AMERICAN: 30
GFR AFRICAN AMERICAN: 32
GFR AFRICAN AMERICAN: 36
GFR AFRICAN AMERICAN: 36
GFR AFRICAN AMERICAN: 38
GFR AFRICAN AMERICAN: 41
GFR AFRICAN AMERICAN: 53
GFR AFRICAN AMERICAN: 53
GFR AFRICAN AMERICAN: 58
GFR AFRICAN AMERICAN: >60
GFR AFRICAN AMERICAN: >60
GFR CALCULATED: 54.7
GFR CALCULATED: 54.7
GFR CALCULATED: 55.8
GFR CALCULATED: 59.9
GFR NON-AFRICAN AMERICAN: 23 ML/MIN/1.73
GFR NON-AFRICAN AMERICAN: 24 ML/MIN/1.73
GFR NON-AFRICAN AMERICAN: 27 ML/MIN/1.73
GFR NON-AFRICAN AMERICAN: 30 ML/MIN/1.73
GFR NON-AFRICAN AMERICAN: 30 ML/MIN/1.73
GFR NON-AFRICAN AMERICAN: 32 ML/MIN/1.73
GFR NON-AFRICAN AMERICAN: 33 ML/MIN/1.73
GFR NON-AFRICAN AMERICAN: 44 ML/MIN/1.73
GFR NON-AFRICAN AMERICAN: 44 ML/MIN/1.73
GFR NON-AFRICAN AMERICAN: 48 ML/MIN/1.73
GFR NON-AFRICAN AMERICAN: 57 ML/MIN/1.73
GFR NON-AFRICAN AMERICAN: 57 ML/MIN/1.73
GLUCOSE BLD-MCNC: 100 MG/DL (ref 74–99)
GLUCOSE BLD-MCNC: 106 MG/DL (ref 74–99)
GLUCOSE BLD-MCNC: 107 MG/DL (ref 74–99)
GLUCOSE BLD-MCNC: 121 MG/DL
GLUCOSE BLD-MCNC: 128 MG/DL (ref 74–99)
GLUCOSE BLD-MCNC: 129 MG/DL (ref 74–99)
GLUCOSE BLD-MCNC: 133 MG/DL (ref 74–99)
GLUCOSE BLD-MCNC: 135 MG/DL
GLUCOSE BLD-MCNC: 153 MG/DL (ref 74–99)
GLUCOSE BLD-MCNC: 154 MG/DL (ref 74–99)
GLUCOSE BLD-MCNC: 165 MG/DL (ref 74–99)
GLUCOSE BLD-MCNC: 188 MG/DL (ref 74–99)
GLUCOSE BLD-MCNC: 189 MG/DL (ref 74–99)
GLUCOSE BLD-MCNC: 200 MG/DL
GLUCOSE BLD-MCNC: 88 MG/DL (ref 74–99)
GLUCOSE BLD-MCNC: 93 MG/DL
GLUCOSE BLD-MCNC: 95 MG/DL
GLUCOSE BLD-MCNC: 95 MG/DL
GLUCOSE URINE: NEGATIVE
GLUCOSE URINE: NEGATIVE MG/DL
HAV IGM SER IA-ACNC: NORMAL
HBA1C MFR BLD: 6 % (ref 4–5.6)
HCO3: 19.2 MMOL/L (ref 22–26)
HCO3: 21 MMOL/L (ref 22–26)
HCO3: 22.1 MMOL/L (ref 22–26)
HCO3: 22.5 MMOL/L (ref 22–26)
HCO3: 23.9 MMOL/L (ref 22–26)
HCT VFR BLD CALC: 24.5 % (ref 37–54)
HCT VFR BLD CALC: 26.6 % (ref 37–54)
HCT VFR BLD CALC: 26.9 % (ref 37–54)
HCT VFR BLD CALC: 29.9 % (ref 37–54)
HCT VFR BLD CALC: 30.7 % (ref 37–54)
HCT VFR BLD CALC: 31 % (ref 37–54)
HCT VFR BLD CALC: 38.3 % (ref 41–53)
HCT VFR BLD CALC: 39.1 % (ref 37–54)
HEMOGLOBIN: 11.6 G/DL (ref 12.5–16.5)
HEMOGLOBIN: 12.2 G/DL (ref 13.5–17.5)
HEMOGLOBIN: 7.1 G/DL (ref 12.5–16.5)
HEMOGLOBIN: 8.4 G/DL (ref 12.5–16.5)
HEMOGLOBIN: 8.4 G/DL (ref 12.5–16.5)
HEMOGLOBIN: 8.9 G/DL (ref 12.5–16.5)
HEMOGLOBIN: 9.3 G/DL (ref 12.5–16.5)
HEMOGLOBIN: 9.5 G/DL (ref 12.5–16.5)
HEPATITIS B CORE IGM ANTIBODY: NORMAL
HEPATITIS B SURFACE ANTIGEN INTERPRETATION: NORMAL
HEPATITIS C ANTIBODY INTERPRETATION: NORMAL
HHB: 1.5 % (ref 0–5)
HHB: 10.8 % (ref 0–5)
HHB: 2 % (ref 0–5)
HHB: 3.3 % (ref 0–5)
HHB: 5.4 % (ref 0–5)
HIV-1 AND HIV-2 ANTIBODIES: NORMAL
HUMAN METAPNEUMOVIRUS BY PCR: NOT DETECTED
HUMAN METAPNEUMOVIRUS BY PCR: NOT DETECTED
HUMAN RHINOVIRUS/ENTEROVIRUS BY PCR: NOT DETECTED
HUMAN RHINOVIRUS/ENTEROVIRUS BY PCR: NOT DETECTED
HYPOCHROMIA: ABNORMAL
IMMATURE GRANULOCYTES #: 0.31 E9/L
IMMATURE GRANULOCYTES #: 0.36 E9/L
IMMATURE GRANULOCYTES %: 2.2 % (ref 0–5)
IMMATURE GRANULOCYTES %: 4 % (ref 0–5)
IMMUNOFIXATION URINE: NORMAL
INFLUENZA A BY PCR: NOT DETECTED
INFLUENZA A BY PCR: NOT DETECTED
INFLUENZA B BY PCR: NOT DETECTED
INFLUENZA B BY PCR: NOT DETECTED
INR BLD: 1.4
KETONES, URINE: NEGATIVE
KETONES, URINE: NEGATIVE MG/DL
L. PNEUMOPHILA SEROGP 1 UR AG: NORMAL
LAB: ABNORMAL
LACTIC ACID, SEPSIS: 1.7 MMOL/L (ref 0.5–1.9)
LACTIC ACID, SEPSIS: 3.2 MMOL/L (ref 0.5–1.9)
LACTIC ACID: 1.1 MMOL/L (ref 0.5–2.2)
LACTIC ACID: 1.6 MMOL/L (ref 0.5–2.2)
LEUKOCYTE ESTERASE, URINE: ABNORMAL
LEUKOCYTE ESTERASE, URINE: NEGATIVE
LV EF: 82 %
LVEF MODALITY: NORMAL
LYMPHOCYTES ABSOLUTE: 0.93 E9/L (ref 1.5–4)
LYMPHOCYTES ABSOLUTE: 1.13 E9/L (ref 1.5–4)
LYMPHOCYTES ABSOLUTE: 1.15 E9/L (ref 1.5–4)
LYMPHOCYTES ABSOLUTE: 1.19 E9/L (ref 1.5–4)
LYMPHOCYTES ABSOLUTE: 1.28 E9/L (ref 1.5–4)
LYMPHOCYTES ABSOLUTE: 1.73 E9/L (ref 1.5–4)
LYMPHOCYTES ABSOLUTE: 1.89 E9/L (ref 1.5–4)
LYMPHOCYTES ABSOLUTE: 2.4 /ΜL
LYMPHOCYTES RELATIVE PERCENT: 10 % (ref 20–42)
LYMPHOCYTES RELATIVE PERCENT: 11.6 % (ref 20–42)
LYMPHOCYTES RELATIVE PERCENT: 16.6 % (ref 20–42)
LYMPHOCYTES RELATIVE PERCENT: 29.6 %
LYMPHOCYTES RELATIVE PERCENT: 3.4 % (ref 20–42)
LYMPHOCYTES RELATIVE PERCENT: 4.4 % (ref 20–42)
LYMPHOCYTES RELATIVE PERCENT: 6.1 % (ref 20–42)
LYMPHOCYTES RELATIVE PERCENT: 7 % (ref 20–42)
Lab: ABNORMAL
MAGNESIUM: 1.9 MG/DL (ref 1.6–2.6)
MAGNESIUM: 2 MG/DL (ref 1.6–2.6)
MAGNESIUM: 2 MG/DL (ref 1.6–2.6)
MAGNESIUM: 2.1 MG/DL (ref 1.6–2.6)
MAGNESIUM: 2.1 MG/DL (ref 1.6–2.6)
MAGNESIUM: 2.3 MG/DL (ref 1.6–2.6)
MAGNESIUM: 2.4 MG/DL (ref 1.6–2.6)
MCH RBC QN AUTO: 30.3 PG (ref 26–35)
MCH RBC QN AUTO: 30.5 PG (ref 26–35)
MCH RBC QN AUTO: 30.7 PG (ref 26–35)
MCH RBC QN AUTO: 31 PG (ref 26–35)
MCH RBC QN AUTO: 31.1 PG (ref 26–35)
MCH RBC QN AUTO: 31.2 PG
MCH RBC QN AUTO: 31.4 PG (ref 26–35)
MCH RBC QN AUTO: 31.6 PG (ref 26–35)
MCHC RBC AUTO-ENTMCNC: 29 % (ref 32–34.5)
MCHC RBC AUTO-ENTMCNC: 29.7 % (ref 32–34.5)
MCHC RBC AUTO-ENTMCNC: 29.8 % (ref 32–34.5)
MCHC RBC AUTO-ENTMCNC: 30.3 % (ref 32–34.5)
MCHC RBC AUTO-ENTMCNC: 30.6 % (ref 32–34.5)
MCHC RBC AUTO-ENTMCNC: 31.2 % (ref 32–34.5)
MCHC RBC AUTO-ENTMCNC: 31.6 % (ref 32–34.5)
MCHC RBC AUTO-ENTMCNC: 31.9 G/DL
MCV RBC AUTO: 100.3 FL (ref 80–99.9)
MCV RBC AUTO: 101.1 FL (ref 80–99.9)
MCV RBC AUTO: 102.3 FL (ref 80–99.9)
MCV RBC AUTO: 102.4 FL (ref 80–99.9)
MCV RBC AUTO: 104.7 FL (ref 80–99.9)
MCV RBC AUTO: 106 FL (ref 80–99.9)
MCV RBC AUTO: 97.8 FL
MCV RBC AUTO: 98.5 FL (ref 80–99.9)
METAMYELOCYTES RELATIVE PERCENT: 0.9 % (ref 0–1)
METAMYELOCYTES RELATIVE PERCENT: 1 % (ref 0–1)
METER GLUCOSE: 100 MG/DL (ref 74–99)
METER GLUCOSE: 103 MG/DL (ref 74–99)
METER GLUCOSE: 106 MG/DL (ref 74–99)
METER GLUCOSE: 108 MG/DL (ref 74–99)
METER GLUCOSE: 113 MG/DL (ref 74–99)
METER GLUCOSE: 117 MG/DL (ref 74–99)
METER GLUCOSE: 121 MG/DL (ref 74–99)
METER GLUCOSE: 123 MG/DL (ref 74–99)
METER GLUCOSE: 129 MG/DL (ref 74–99)
METER GLUCOSE: 135 MG/DL (ref 74–99)
METER GLUCOSE: 136 MG/DL (ref 74–99)
METER GLUCOSE: 95 MG/DL (ref 74–99)
METER GLUCOSE: 95 MG/DL (ref 74–99)
METER GLUCOSE: 97 MG/DL (ref 74–99)
METER GLUCOSE: 99 MG/DL (ref 74–99)
METHB: 0.1 % (ref 0–1.5)
METHB: 0.1 % (ref 0–1.5)
METHB: 0.3 % (ref 0–1.5)
MODE: ABNORMAL
MONOCYTES ABSOLUTE: 0 E9/L (ref 0.1–0.95)
MONOCYTES ABSOLUTE: 0.58 E9/L (ref 0.1–0.95)
MONOCYTES ABSOLUTE: 0.74 E9/L (ref 0.1–0.95)
MONOCYTES ABSOLUTE: 0.75 E9/L (ref 0.1–0.95)
MONOCYTES ABSOLUTE: 0.94 /ΜL
MONOCYTES ABSOLUTE: 1.05 E9/L (ref 0.1–0.95)
MONOCYTES ABSOLUTE: 1.13 E9/L (ref 0.1–0.95)
MONOCYTES ABSOLUTE: 1.86 E9/L (ref 0.1–0.95)
MONOCYTES RELATIVE PERCENT: 11.7 %
MONOCYTES RELATIVE PERCENT: 3 % (ref 2–12)
MONOCYTES RELATIVE PERCENT: 5 % (ref 2–12)
MONOCYTES RELATIVE PERCENT: 5.5 % (ref 2–12)
MONOCYTES RELATIVE PERCENT: 6.1 % (ref 2–12)
MONOCYTES RELATIVE PERCENT: 6.4 % (ref 2–12)
MONOCYTES RELATIVE PERCENT: 7.9 % (ref 2–12)
MONOCYTES RELATIVE PERCENT: 9.7 % (ref 2–12)
MYCOPLASMA PNEUMONIAE BY PCR: NOT DETECTED
MYCOPLASMA PNEUMONIAE BY PCR: NOT DETECTED
MYCOPLASMA PNEUMONIAE IGM: NORMAL
MYELOCYTE PERCENT: 0.8 % (ref 0–0)
MYELOCYTE PERCENT: 1 % (ref 0–0)
MYELOCYTE PERCENT: 1.7 % (ref 0–0)
MYELOCYTE PERCENT: 1.8 % (ref 0–0)
NEUTROPHILS ABSOLUTE: 12.85 E9/L (ref 1.8–7.3)
NEUTROPHILS ABSOLUTE: 16.54 E9/L (ref 1.8–7.3)
NEUTROPHILS ABSOLUTE: 20.42 E9/L (ref 1.8–7.3)
NEUTROPHILS ABSOLUTE: 22.23 E9/L (ref 1.8–7.3)
NEUTROPHILS ABSOLUTE: 3.81 /ΜL
NEUTROPHILS ABSOLUTE: 38.51 E9/L (ref 1.8–7.3)
NEUTROPHILS ABSOLUTE: 5.33 E9/L (ref 1.8–7.3)
NEUTROPHILS ABSOLUTE: 9.78 E9/L (ref 1.8–7.3)
NEUTROPHILS RELATIVE PERCENT: 47.1 %
NEUTROPHILS RELATIVE PERCENT: 69.3 % (ref 43–80)
NEUTROPHILS RELATIVE PERCENT: 78.9 % (ref 43–80)
NEUTROPHILS RELATIVE PERCENT: 83 % (ref 43–80)
NEUTROPHILS RELATIVE PERCENT: 85.2 % (ref 43–80)
NEUTROPHILS RELATIVE PERCENT: 86 % (ref 43–80)
NEUTROPHILS RELATIVE PERCENT: 90 % (ref 43–80)
NEUTROPHILS RELATIVE PERCENT: 95.8 % (ref 43–80)
NITRITE, URINE: NEGATIVE
NITRITE, URINE: NEGATIVE
O2 CONTENT: 12.3 ML/DL
O2 CONTENT: 12.8 ML/DL
O2 CONTENT: 13.6 ML/DL
O2 CONTENT: 14.7 ML/DL
O2 CONTENT: 14.8 ML/DL
O2 SATURATION: 89.2 % (ref 92–98.5)
O2 SATURATION: 94.6 % (ref 92–98.5)
O2 SATURATION: 96.7 % (ref 92–98.5)
O2 SATURATION: 98 % (ref 92–98.5)
O2 SATURATION: 98.5 % (ref 92–98.5)
O2HB: 88.8 % (ref 94–97)
O2HB: 94.3 % (ref 94–97)
O2HB: 96.4 % (ref 94–97)
O2HB: 97.8 % (ref 94–97)
O2HB: 97.9 % (ref 94–97)
OPERATOR ID: 9689
OPERATOR ID: ABNORMAL
OPERATOR ID: ABNORMAL
ORGANISM: ABNORMAL
OSMOLALITY URINE: 643 MOSM/KG (ref 300–900)
OSMOLALITY: 351 MOSM/KG (ref 285–310)
PARAINFLUENZA VIRUS 1 BY PCR: NOT DETECTED
PARAINFLUENZA VIRUS 1 BY PCR: NOT DETECTED
PARAINFLUENZA VIRUS 2 BY PCR: NOT DETECTED
PARAINFLUENZA VIRUS 2 BY PCR: NOT DETECTED
PARAINFLUENZA VIRUS 3 BY PCR: NOT DETECTED
PARAINFLUENZA VIRUS 3 BY PCR: NOT DETECTED
PARAINFLUENZA VIRUS 4 BY PCR: NOT DETECTED
PARAINFLUENZA VIRUS 4 BY PCR: NOT DETECTED
PARATHYROID HORMONE INTACT: 129 PG/ML (ref 15–65)
PATIENT TEMP: 37 C
PCO2: 34.9 MMHG (ref 35–45)
PCO2: 38.4 MMHG (ref 35–45)
PCO2: 68.2 MMHG (ref 35–45)
PCO2: 68.3 MMHG (ref 35–45)
PCO2: 78.6 MMHG (ref 35–45)
PDW BLD-RTO: 14.4 FL (ref 11.5–15)
PDW BLD-RTO: 14.4 FL (ref 11.5–15)
PDW BLD-RTO: 14.5 FL (ref 11.5–15)
PDW BLD-RTO: 14.7 FL (ref 11.5–15)
PDW BLD-RTO: 15 FL (ref 11.5–15)
PDW BLD-RTO: 15 FL (ref 11.5–15)
PDW BLD-RTO: 15.2 FL (ref 11.5–15)
PEEP/CPAP: 10 CMH2O
PEEP/CPAP: 6 CMH2O
PFO2: 0.75 MMHG/%
PFO2: 0.95 MMHG/%
PFO2: 1.73 MMHG/%
PH BLOOD GAS: 7.1 (ref 7.35–7.45)
PH BLOOD GAS: 7.13 (ref 7.35–7.45)
PH BLOOD GAS: 7.14 (ref 7.35–7.45)
PH BLOOD GAS: 7.36 (ref 7.35–7.45)
PH BLOOD GAS: 7.36 (ref 7.35–7.45)
PH UA: 5.5 (ref 4.5–8)
PH UA: 8.5 (ref 5–9)
PHOSPHORUS: 3.4 MG/DL (ref 2.5–4.5)
PHOSPHORUS: 3.5 MG/DL (ref 2.5–4.5)
PHOSPHORUS: 3.9 MG/DL (ref 2.5–4.5)
PHOSPHORUS: 4 MG/DL (ref 2.5–4.5)
PHOSPHORUS: 6.3 MG/DL (ref 2.5–4.5)
PHOSPHORUS: 8.2 MG/DL (ref 2.5–4.5)
PLATELET # BLD: 115 E9/L (ref 130–450)
PLATELET # BLD: 142 E9/L (ref 130–450)
PLATELET # BLD: 153 E9/L (ref 130–450)
PLATELET # BLD: 155 E9/L (ref 130–450)
PLATELET # BLD: 177 K/ΜL
PLATELET # BLD: 181 E9/L (ref 130–450)
PLATELET # BLD: 205 E9/L (ref 130–450)
PLATELET # BLD: 262 E9/L (ref 130–450)
PMV BLD AUTO: 10.7 FL
PMV BLD AUTO: 11.6 FL (ref 7–12)
PMV BLD AUTO: 11.7 FL (ref 7–12)
PMV BLD AUTO: 12.2 FL (ref 7–12)
PMV BLD AUTO: 12.3 FL (ref 7–12)
PMV BLD AUTO: 13.4 FL (ref 7–12)
PO2: 172.6 MMHG (ref 75–100)
PO2: 178.2 MMHG (ref 75–100)
PO2: 74.9 MMHG (ref 75–100)
PO2: 95.2 MMHG (ref 75–100)
PO2: 98.9 MMHG (ref 75–100)
POIKILOCYTES: ABNORMAL
POIKILOCYTES: ABNORMAL
POLYCHROMASIA: ABNORMAL
POTASSIUM SERPL-SCNC: 3.8 MMOL/L (ref 3.5–5)
POTASSIUM SERPL-SCNC: 3.8 MMOL/L (ref 3.5–5)
POTASSIUM SERPL-SCNC: 3.9 MMOL/L (ref 3.5–5)
POTASSIUM SERPL-SCNC: 3.9 MMOL/L (ref 3.5–5)
POTASSIUM SERPL-SCNC: 4 MMOL/L (ref 3.5–5)
POTASSIUM SERPL-SCNC: 4.2 MMOL/L (ref 3.5–5)
POTASSIUM SERPL-SCNC: 4.2 MMOL/L (ref 3.5–5)
POTASSIUM SERPL-SCNC: 4.3 MMOL/L
POTASSIUM SERPL-SCNC: 4.3 MMOL/L (ref 3.5–5)
POTASSIUM SERPL-SCNC: 4.3 MMOL/L (ref 3.5–5)
POTASSIUM SERPL-SCNC: 4.4 MMOL/L (ref 3.5–5)
POTASSIUM SERPL-SCNC: 4.5 MMOL/L (ref 3.5–5)
POTASSIUM SERPL-SCNC: 4.7 MMOL/L
POTASSIUM SERPL-SCNC: 4.8 MMOL/L
POTASSIUM SERPL-SCNC: 4.8 MMOL/L
POTASSIUM SERPL-SCNC: 4.8 MMOL/L (ref 3.5–5)
PROCALCITONIN: 1.55 NG/ML (ref 0–0.08)
PROTEIN UA: >=300 MG/DL
PROTEIN UA: POSITIVE
PROTHROMBIN TIME: 15.9 SEC (ref 9.3–12.4)
PS: 14 CMH20
PS: 18 CMH20
RBC # BLD: 2.34 E12/L (ref 3.8–5.8)
RBC # BLD: 2.66 E12/L (ref 3.8–5.8)
RBC # BLD: 2.7 E12/L (ref 3.8–5.8)
RBC # BLD: 2.92 E12/L (ref 3.8–5.8)
RBC # BLD: 3 E12/L (ref 3.8–5.8)
RBC # BLD: 3.09 E12/L (ref 3.8–5.8)
RBC # BLD: 3.69 E12/L (ref 3.8–5.8)
RBC # BLD: 3.91 10^6/ΜL
RBC # BLD: NORMAL 10*6/UL
RBC UA: >20 /HPF (ref 0–2)
RESPIRATORY SYNCYTIAL VIRUS BY PCR: NOT DETECTED
RESPIRATORY SYNCYTIAL VIRUS BY PCR: NOT DETECTED
RI(T): 2.59
RI(T): 5.51
RI(T): 7.14
SARS-COV-2 ANTIBODY, TOTAL: NORMAL
SARS-COV-2 ANTIBODY, TOTAL: NORMAL
SARS-COV-2, PCR: NOT DETECTED
SARS-COV-2, PCR: NOT DETECTED
SODIUM BLD-SCNC: 140 MMOL/L
SODIUM BLD-SCNC: 141 MMOL/L (ref 132–146)
SODIUM BLD-SCNC: 142 MMOL/L
SODIUM BLD-SCNC: 143 MMOL/L
SODIUM BLD-SCNC: 143 MMOL/L (ref 132–146)
SODIUM BLD-SCNC: 144 MMOL/L
SODIUM BLD-SCNC: 144 MMOL/L (ref 132–146)
SODIUM BLD-SCNC: 147 MMOL/L (ref 132–146)
SODIUM BLD-SCNC: 147 MMOL/L (ref 132–146)
SODIUM BLD-SCNC: 150 MMOL/L (ref 132–146)
SODIUM BLD-SCNC: 154 MMOL/L (ref 132–146)
SODIUM BLD-SCNC: 155 MMOL/L (ref 132–146)
SODIUM BLD-SCNC: 157 MMOL/L (ref 132–146)
SODIUM BLD-SCNC: 157 MMOL/L (ref 132–146)
SODIUM BLD-SCNC: 159 MMOL/L (ref 132–146)
SODIUM BLD-SCNC: 162 MMOL/L (ref 132–146)
SODIUM URINE: 56 MMOL/L
SOURCE, BLOOD GAS: ABNORMAL
SPECIFIC GRAVITY UA: 1.01 (ref 1–1.03)
SPECIFIC GRAVITY, URINE: 1.02
STREP PNEUMONIAE ANTIGEN, URINE: NORMAL
TARGET CELLS: ABNORMAL
THB: 10.4 G/DL (ref 11.5–16.5)
THB: 10.5 G/DL (ref 11.5–16.5)
THB: 9.5 G/DL (ref 11.5–16.5)
THB: 9.8 G/DL (ref 11.5–16.5)
THB: 9.9 G/DL (ref 11.5–16.5)
TIME ANALYZED: 104
TIME ANALYZED: 1151
TIME ANALYZED: 1538
TIME ANALYZED: 2305
TIME ANALYZED: 527
TOTAL CK: 198 U/L (ref 20–200)
TOTAL CK: 353 U/L (ref 20–200)
TOTAL PROTEIN: 5.4 G/DL (ref 6.4–8.3)
TOTAL PROTEIN: 5.5 G/DL (ref 6.4–8.3)
TOTAL PROTEIN: 5.7 G/DL (ref 6.4–8.3)
TOTAL PROTEIN: 5.8 G/DL (ref 6.4–8.3)
TOTAL PROTEIN: 6.3
TOTAL PROTEIN: 6.9 G/DL (ref 6.4–8.3)
TOTAL PROTEIN: 7.1 G/DL (ref 6.4–8.3)
TOTAL PROTEIN: 7.2 G/DL (ref 6.4–8.3)
TROPONIN: 0.16 NG/ML (ref 0–0.03)
TROPONIN: 0.21 NG/ML (ref 0–0.03)
TROPONIN: 0.24 NG/ML (ref 0–0.03)
TROPONIN: 0.24 NG/ML (ref 0–0.03)
TROPONIN: 0.29 NG/ML (ref 0–0.03)
TSH SERPL DL<=0.05 MIU/L-ACNC: 2.52 UIU/ML (ref 0.27–4.2)
TSH SERPL DL<=0.05 MIU/L-ACNC: 4.16 UIU/ML
URINE CULTURE, ROUTINE: NORMAL
UROBILINOGEN, URINE: 2 E.U./DL
UROBILINOGEN, URINE: ABNORMAL
VANCOMYCIN RANDOM: 12.3 MCG/ML (ref 5–40)
VANCOMYCIN RANDOM: 16.3 MCG/ML (ref 5–40)
VITAMIN B-12: 1123 PG/ML (ref 211–946)
VITAMIN D 25-HYDROXY: 14 NG/ML (ref 30–100)
WBC # BLD: 11.5 E9/L (ref 4.5–11.5)
WBC # BLD: 16.3 E9/L (ref 4.5–11.5)
WBC # BLD: 18.8 E9/L (ref 4.5–11.5)
WBC # BLD: 23.2 E9/L (ref 4.5–11.5)
WBC # BLD: 24.7 E9/L (ref 4.5–11.5)
WBC # BLD: 39.7 E9/L (ref 4.5–11.5)
WBC # BLD: 7.7 E9/L (ref 4.5–11.5)
WBC # BLD: 8.1 10^3/ML
WBC UA: ABNORMAL /HPF (ref 0–5)

## 2020-01-01 PROCEDURE — 85025 COMPLETE CBC W/AUTO DIFF WBC: CPT

## 2020-01-01 PROCEDURE — 0202U NFCT DS 22 TRGT SARS-COV-2: CPT

## 2020-01-01 PROCEDURE — 82962 GLUCOSE BLOOD TEST: CPT

## 2020-01-01 PROCEDURE — 1123F ACP DISCUSS/DSCN MKR DOCD: CPT | Performed by: PHYSICIAN ASSISTANT

## 2020-01-01 PROCEDURE — 6360000002 HC RX W HCPCS: Performed by: CLINICAL NURSE SPECIALIST

## 2020-01-01 PROCEDURE — 6370000000 HC RX 637 (ALT 250 FOR IP): Performed by: INTERNAL MEDICINE

## 2020-01-01 PROCEDURE — 36592 COLLECT BLOOD FROM PICC: CPT

## 2020-01-01 PROCEDURE — 2500000003 HC RX 250 WO HCPCS: Performed by: INTERNAL MEDICINE

## 2020-01-01 PROCEDURE — 82306 VITAMIN D 25 HYDROXY: CPT

## 2020-01-01 PROCEDURE — 2580000003 HC RX 258: Performed by: INTERNAL MEDICINE

## 2020-01-01 PROCEDURE — 82746 ASSAY OF FOLIC ACID SERUM: CPT

## 2020-01-01 PROCEDURE — 11721 DEBRIDE NAIL 6 OR MORE: CPT | Performed by: PODIATRIST

## 2020-01-01 PROCEDURE — G8417 CALC BMI ABV UP PARAM F/U: HCPCS | Performed by: PHYSICIAN ASSISTANT

## 2020-01-01 PROCEDURE — 6360000002 HC RX W HCPCS: Performed by: SPECIALIST

## 2020-01-01 PROCEDURE — 84100 ASSAY OF PHOSPHORUS: CPT

## 2020-01-01 PROCEDURE — 86769 SARS-COV-2 COVID-19 ANTIBODY: CPT

## 2020-01-01 PROCEDURE — 83735 ASSAY OF MAGNESIUM: CPT

## 2020-01-01 PROCEDURE — 6360000002 HC RX W HCPCS: Performed by: INTERNAL MEDICINE

## 2020-01-01 PROCEDURE — 94640 AIRWAY INHALATION TREATMENT: CPT

## 2020-01-01 PROCEDURE — 85610 PROTHROMBIN TIME: CPT

## 2020-01-01 PROCEDURE — 2700000000 HC OXYGEN THERAPY PER DAY

## 2020-01-01 PROCEDURE — P9047 ALBUMIN (HUMAN), 25%, 50ML: HCPCS | Performed by: INTERNAL MEDICINE

## 2020-01-01 PROCEDURE — 83605 ASSAY OF LACTIC ACID: CPT

## 2020-01-01 PROCEDURE — 6360000002 HC RX W HCPCS: Performed by: NURSE PRACTITIONER

## 2020-01-01 PROCEDURE — 87040 BLOOD CULTURE FOR BACTERIA: CPT

## 2020-01-01 PROCEDURE — 36415 COLL VENOUS BLD VENIPUNCTURE: CPT

## 2020-01-01 PROCEDURE — 84165 PROTEIN E-PHORESIS SERUM: CPT

## 2020-01-01 PROCEDURE — 87088 URINE BACTERIA CULTURE: CPT

## 2020-01-01 PROCEDURE — 71045 X-RAY EXAM CHEST 1 VIEW: CPT

## 2020-01-01 PROCEDURE — 93005 ELECTROCARDIOGRAM TRACING: CPT | Performed by: STUDENT IN AN ORGANIZED HEALTH CARE EDUCATION/TRAINING PROGRAM

## 2020-01-01 PROCEDURE — 82805 BLOOD GASES W/O2 SATURATION: CPT

## 2020-01-01 PROCEDURE — 36600 WITHDRAWAL OF ARTERIAL BLOOD: CPT

## 2020-01-01 PROCEDURE — 87449 NOS EACH ORGANISM AG IA: CPT

## 2020-01-01 PROCEDURE — 74230 X-RAY XM SWLNG FUNCJ C+: CPT

## 2020-01-01 PROCEDURE — 94660 CPAP INITIATION&MGMT: CPT

## 2020-01-01 PROCEDURE — 82550 ASSAY OF CK (CPK): CPT

## 2020-01-01 PROCEDURE — 80053 COMPREHEN METABOLIC PANEL: CPT

## 2020-01-01 PROCEDURE — 2000000000 HC ICU R&B

## 2020-01-01 PROCEDURE — 2060000000 HC ICU INTERMEDIATE R&B

## 2020-01-01 PROCEDURE — 2580000003 HC RX 258: Performed by: STUDENT IN AN ORGANIZED HEALTH CARE EDUCATION/TRAINING PROGRAM

## 2020-01-01 PROCEDURE — C9113 INJ PANTOPRAZOLE SODIUM, VIA: HCPCS | Performed by: INTERNAL MEDICINE

## 2020-01-01 PROCEDURE — 2580000003 HC RX 258: Performed by: NURSE PRACTITIONER

## 2020-01-01 PROCEDURE — 2580000003 HC RX 258: Performed by: CLINICAL NURSE SPECIALIST

## 2020-01-01 PROCEDURE — 84300 ASSAY OF URINE SODIUM: CPT

## 2020-01-01 PROCEDURE — G8420 CALC BMI NORM PARAMETERS: HCPCS | Performed by: FAMILY MEDICINE

## 2020-01-01 PROCEDURE — 80202 ASSAY OF VANCOMYCIN: CPT

## 2020-01-01 PROCEDURE — 70450 CT HEAD/BRAIN W/O DYE: CPT

## 2020-01-01 PROCEDURE — 83935 ASSAY OF URINE OSMOLALITY: CPT

## 2020-01-01 PROCEDURE — 85378 FIBRIN DEGRADE SEMIQUANT: CPT

## 2020-01-01 PROCEDURE — G8420 CALC BMI NORM PARAMETERS: HCPCS | Performed by: PHYSICIAN ASSISTANT

## 2020-01-01 PROCEDURE — 82607 VITAMIN B-12: CPT

## 2020-01-01 PROCEDURE — 82330 ASSAY OF CALCIUM: CPT

## 2020-01-01 PROCEDURE — 93005 ELECTROCARDIOGRAM TRACING: CPT | Performed by: INTERNAL MEDICINE

## 2020-01-01 PROCEDURE — C9113 INJ PANTOPRAZOLE SODIUM, VIA: HCPCS | Performed by: NURSE PRACTITIONER

## 2020-01-01 PROCEDURE — G8417 CALC BMI ABV UP PARAM F/U: HCPCS | Performed by: FAMILY MEDICINE

## 2020-01-01 PROCEDURE — 90694 VACC AIIV4 NO PRSRV 0.5ML IM: CPT | Performed by: PHYSICIAN ASSISTANT

## 2020-01-01 PROCEDURE — 97535 SELF CARE MNGMENT TRAINING: CPT

## 2020-01-01 PROCEDURE — 84145 PROCALCITONIN (PCT): CPT

## 2020-01-01 PROCEDURE — 6360000002 HC RX W HCPCS: Performed by: STUDENT IN AN ORGANIZED HEALTH CARE EDUCATION/TRAINING PROGRAM

## 2020-01-01 PROCEDURE — 99285 EMERGENCY DEPT VISIT HI MDM: CPT

## 2020-01-01 PROCEDURE — G8482 FLU IMMUNIZE ORDER/ADMIN: HCPCS | Performed by: PHYSICIAN ASSISTANT

## 2020-01-01 PROCEDURE — 36556 INSERT NON-TUNNEL CV CATH: CPT

## 2020-01-01 PROCEDURE — 2500000003 HC RX 250 WO HCPCS: Performed by: STUDENT IN AN ORGANIZED HEALTH CARE EDUCATION/TRAINING PROGRAM

## 2020-01-01 PROCEDURE — 93010 ELECTROCARDIOGRAM REPORT: CPT | Performed by: INTERNAL MEDICINE

## 2020-01-01 PROCEDURE — 6370000000 HC RX 637 (ALT 250 FOR IP): Performed by: STUDENT IN AN ORGANIZED HEALTH CARE EDUCATION/TRAINING PROGRAM

## 2020-01-01 PROCEDURE — 87186 SC STD MICRODIL/AGAR DIL: CPT

## 2020-01-01 PROCEDURE — 84484 ASSAY OF TROPONIN QUANT: CPT

## 2020-01-01 PROCEDURE — 96365 THER/PROPH/DIAG IV INF INIT: CPT

## 2020-01-01 PROCEDURE — 2580000003 HC RX 258: Performed by: SPECIALIST

## 2020-01-01 PROCEDURE — 83930 ASSAY OF BLOOD OSMOLALITY: CPT

## 2020-01-01 PROCEDURE — 82553 CREATINE MB FRACTION: CPT

## 2020-01-01 PROCEDURE — 92611 MOTION FLUOROSCOPY/SWALLOW: CPT | Performed by: SPEECH-LANGUAGE PATHOLOGIST

## 2020-01-01 PROCEDURE — 86334 IMMUNOFIX E-PHORESIS SERUM: CPT

## 2020-01-01 PROCEDURE — 87450 HC DIRECT STREP B ANTIGEN: CPT

## 2020-01-01 PROCEDURE — 80074 ACUTE HEPATITIS PANEL: CPT

## 2020-01-01 PROCEDURE — 86703 HIV-1/HIV-2 1 RESULT ANTBDY: CPT

## 2020-01-01 PROCEDURE — 83036 HEMOGLOBIN GLYCOSYLATED A1C: CPT

## 2020-01-01 PROCEDURE — 1123F ACP DISCUSS/DSCN MKR DOCD: CPT | Performed by: FAMILY MEDICINE

## 2020-01-01 PROCEDURE — 74176 CT ABD & PELVIS W/O CONTRAST: CPT

## 2020-01-01 PROCEDURE — 80048 BASIC METABOLIC PNL TOTAL CA: CPT

## 2020-01-01 PROCEDURE — 96361 HYDRATE IV INFUSION ADD-ON: CPT

## 2020-01-01 PROCEDURE — 99221 1ST HOSP IP/OBS SF/LOW 40: CPT | Performed by: STUDENT IN AN ORGANIZED HEALTH CARE EDUCATION/TRAINING PROGRAM

## 2020-01-01 PROCEDURE — 84443 ASSAY THYROID STIM HORMONE: CPT

## 2020-01-01 PROCEDURE — 97530 THERAPEUTIC ACTIVITIES: CPT | Performed by: PHYSICAL THERAPIST

## 2020-01-01 PROCEDURE — 02HV33Z INSERTION OF INFUSION DEVICE INTO SUPERIOR VENA CAVA, PERCUTANEOUS APPROACH: ICD-10-PCS | Performed by: EMERGENCY MEDICINE

## 2020-01-01 PROCEDURE — 81001 URINALYSIS AUTO W/SCOPE: CPT

## 2020-01-01 PROCEDURE — G8482 FLU IMMUNIZE ORDER/ADMIN: HCPCS | Performed by: FAMILY MEDICINE

## 2020-01-01 PROCEDURE — 93306 TTE W/DOPPLER COMPLETE: CPT

## 2020-01-01 PROCEDURE — G0008 ADMIN INFLUENZA VIRUS VAC: HCPCS | Performed by: PHYSICIAN ASSISTANT

## 2020-01-01 PROCEDURE — 80076 HEPATIC FUNCTION PANEL: CPT

## 2020-01-01 PROCEDURE — 87077 CULTURE AEROBIC IDENTIFY: CPT

## 2020-01-01 PROCEDURE — 84166 PROTEIN E-PHORESIS/URINE/CSF: CPT

## 2020-01-01 PROCEDURE — G8484 FLU IMMUNIZE NO ADMIN: HCPCS | Performed by: PHYSICIAN ASSISTANT

## 2020-01-01 PROCEDURE — 85730 THROMBOPLASTIN TIME PARTIAL: CPT

## 2020-01-01 PROCEDURE — 97167 OT EVAL HIGH COMPLEX 60 MIN: CPT

## 2020-01-01 PROCEDURE — 83970 ASSAY OF PARATHORMONE: CPT

## 2020-01-01 PROCEDURE — 97161 PT EVAL LOW COMPLEX 20 MIN: CPT | Performed by: PHYSICAL THERAPIST

## 2020-01-01 PROCEDURE — 86738 MYCOPLASMA ANTIBODY: CPT

## 2020-01-01 PROCEDURE — 2580000003 HC RX 258

## 2020-01-01 RX ORDER — DEXTROSE MONOHYDRATE 50 MG/ML
100 INJECTION, SOLUTION INTRAVENOUS PRN
Status: DISCONTINUED | OUTPATIENT
Start: 2020-01-01 | End: 2020-01-01

## 2020-01-01 RX ORDER — CIPROFLOXACIN 500 MG/1
500 TABLET, FILM COATED ORAL 2 TIMES DAILY
Qty: 14 TABLET | Refills: 0 | Status: SHIPPED
Start: 2020-01-01 | End: 2020-01-01 | Stop reason: ALTCHOICE

## 2020-01-01 RX ORDER — ALBUTEROL SULFATE 2.5 MG/3ML
2.5 SOLUTION RESPIRATORY (INHALATION) EVERY 4 HOURS
Status: DISCONTINUED | OUTPATIENT
Start: 2020-01-01 | End: 2020-01-01

## 2020-01-01 RX ORDER — SODIUM CHLORIDE 0.9 % (FLUSH) 0.9 %
10 SYRINGE (ML) INJECTION EVERY 12 HOURS SCHEDULED
Status: DISCONTINUED | OUTPATIENT
Start: 2020-01-01 | End: 2020-01-01

## 2020-01-01 RX ORDER — FUROSEMIDE 10 MG/ML
40 INJECTION INTRAMUSCULAR; INTRAVENOUS ONCE
Status: COMPLETED | OUTPATIENT
Start: 2020-01-01 | End: 2020-01-01

## 2020-01-01 RX ORDER — TAMSULOSIN HYDROCHLORIDE 0.4 MG/1
0.4 CAPSULE ORAL DAILY
COMMUNITY

## 2020-01-01 RX ORDER — LORAZEPAM 2 MG/ML
1 INJECTION INTRAMUSCULAR
Status: DISCONTINUED | OUTPATIENT
Start: 2020-01-01 | End: 2020-01-01 | Stop reason: HOSPADM

## 2020-01-01 RX ORDER — TAMSULOSIN HYDROCHLORIDE 0.4 MG/1
0.4 CAPSULE ORAL DAILY
Status: DISCONTINUED | OUTPATIENT
Start: 2020-01-01 | End: 2020-01-01

## 2020-01-01 RX ORDER — DEXTROSE MONOHYDRATE 50 MG/ML
INJECTION, SOLUTION INTRAVENOUS CONTINUOUS
Status: DISCONTINUED | OUTPATIENT
Start: 2020-01-01 | End: 2020-01-01

## 2020-01-01 RX ORDER — SCOLOPAMINE TRANSDERMAL SYSTEM 1 MG/1
1 PATCH, EXTENDED RELEASE TRANSDERMAL
Status: DISCONTINUED | OUTPATIENT
Start: 2020-01-01 | End: 2020-01-01 | Stop reason: HOSPADM

## 2020-01-01 RX ORDER — ACETAMINOPHEN 325 MG/1
650 TABLET ORAL EVERY 4 HOURS PRN
COMMUNITY

## 2020-01-01 RX ORDER — IPRATROPIUM BROMIDE AND ALBUTEROL SULFATE 2.5; .5 MG/3ML; MG/3ML
1 SOLUTION RESPIRATORY (INHALATION) ONCE
Status: COMPLETED | OUTPATIENT
Start: 2020-01-01 | End: 2020-01-01

## 2020-01-01 RX ORDER — POTASSIUM CHLORIDE AND SODIUM CHLORIDE 900; 300 MG/100ML; MG/100ML
INJECTION, SOLUTION INTRAVENOUS CONTINUOUS
Status: DISCONTINUED | OUTPATIENT
Start: 2020-01-01 | End: 2020-01-01

## 2020-01-01 RX ORDER — ALBUTEROL SULFATE 2.5 MG/3ML
2.5 SOLUTION RESPIRATORY (INHALATION) EVERY 4 HOURS PRN
Status: DISCONTINUED | OUTPATIENT
Start: 2020-01-01 | End: 2020-01-01

## 2020-01-01 RX ORDER — PROMETHAZINE HYDROCHLORIDE 25 MG/ML
25 INJECTION, SOLUTION INTRAMUSCULAR; INTRAVENOUS EVERY 6 HOURS PRN
COMMUNITY
Start: 2019-03-22

## 2020-01-01 RX ORDER — BETHANECHOL CHLORIDE 10 MG/1
10 TABLET ORAL 3 TIMES DAILY
Qty: 90 TABLET | Refills: 10 | Status: SHIPPED | OUTPATIENT
Start: 2020-01-01

## 2020-01-01 RX ORDER — SODIUM CHLORIDE 9 MG/ML
25 INJECTION, SOLUTION INTRAVENOUS EVERY 12 HOURS
Status: DISCONTINUED | OUTPATIENT
Start: 2020-01-01 | End: 2020-01-01

## 2020-01-01 RX ORDER — 0.9 % SODIUM CHLORIDE 0.9 %
2000 INTRAVENOUS SOLUTION INTRAVENOUS ONCE
Status: COMPLETED | OUTPATIENT
Start: 2020-01-01 | End: 2020-01-01

## 2020-01-01 RX ORDER — HEPARIN SODIUM 5000 [USP'U]/ML
5000 INJECTION, SOLUTION INTRAVENOUS; SUBCUTANEOUS EVERY 8 HOURS SCHEDULED
Status: DISCONTINUED | OUTPATIENT
Start: 2020-01-01 | End: 2020-01-01

## 2020-01-01 RX ORDER — POLYETHYLENE GLYCOL 3350 17 G/17G
17 POWDER, FOR SOLUTION ORAL DAILY PRN
Status: DISCONTINUED | OUTPATIENT
Start: 2020-01-01 | End: 2020-01-01

## 2020-01-01 RX ORDER — RIVASTIGMINE 4.6 MG/24H
1 PATCH, EXTENDED RELEASE TRANSDERMAL DAILY
Status: DISCONTINUED | OUTPATIENT
Start: 2020-01-01 | End: 2020-01-01

## 2020-01-01 RX ORDER — BISACODYL 10 MG
10 SUPPOSITORY, RECTAL RECTAL DAILY PRN
COMMUNITY
Start: 2016-07-20

## 2020-01-01 RX ORDER — PANTOPRAZOLE SODIUM 40 MG/1
40 TABLET, DELAYED RELEASE ORAL
Status: DISCONTINUED | OUTPATIENT
Start: 2020-01-01 | End: 2020-01-01

## 2020-01-01 RX ORDER — CEFEPIME HYDROCHLORIDE 2 G/1
INJECTION, POWDER, FOR SOLUTION INTRAVENOUS
Status: DISPENSED
Start: 2020-01-01 | End: 2020-01-01

## 2020-01-01 RX ORDER — PANTOPRAZOLE SODIUM 40 MG/10ML
40 INJECTION, POWDER, LYOPHILIZED, FOR SOLUTION INTRAVENOUS DAILY
Status: DISCONTINUED | OUTPATIENT
Start: 2020-01-01 | End: 2020-01-01 | Stop reason: SDUPTHER

## 2020-01-01 RX ORDER — OLANZAPINE 5 MG/1
5 TABLET ORAL NIGHTLY
COMMUNITY

## 2020-01-01 RX ORDER — OLANZAPINE 5 MG/1
5 TABLET ORAL NIGHTLY
Status: DISCONTINUED | OUTPATIENT
Start: 2020-01-01 | End: 2020-01-01

## 2020-01-01 RX ORDER — DEXTROSE MONOHYDRATE 50 MG/ML
INJECTION, SOLUTION INTRAVENOUS
Status: COMPLETED
Start: 2020-01-01 | End: 2020-01-01

## 2020-01-01 RX ORDER — GLYCOPYRROLATE 0.2 MG/ML
0.2 INJECTION INTRAMUSCULAR; INTRAVENOUS
Status: DISCONTINUED | OUTPATIENT
Start: 2020-01-01 | End: 2020-01-01 | Stop reason: HOSPADM

## 2020-01-01 RX ORDER — MIDODRINE HYDROCHLORIDE 5 MG/1
10 TABLET ORAL
Status: DISCONTINUED | OUTPATIENT
Start: 2020-01-01 | End: 2020-01-01

## 2020-01-01 RX ORDER — 0.9 % SODIUM CHLORIDE 0.9 %
1000 INTRAVENOUS SOLUTION INTRAVENOUS ONCE
Status: DISCONTINUED | OUTPATIENT
Start: 2020-01-01 | End: 2020-01-01

## 2020-01-01 RX ORDER — ALBUMIN (HUMAN) 12.5 G/50ML
50 SOLUTION INTRAVENOUS EVERY 8 HOURS
Status: COMPLETED | OUTPATIENT
Start: 2020-01-01 | End: 2020-01-01

## 2020-01-01 RX ORDER — SODIUM CHLORIDE, SODIUM LACTATE, POTASSIUM CHLORIDE, CALCIUM CHLORIDE 600; 310; 30; 20 MG/100ML; MG/100ML; MG/100ML; MG/100ML
INJECTION, SOLUTION INTRAVENOUS
Status: DISPENSED
Start: 2020-01-01 | End: 2020-01-01

## 2020-01-01 RX ORDER — MIRTAZAPINE 15 MG/1
15 TABLET, FILM COATED ORAL NIGHTLY
COMMUNITY

## 2020-01-01 RX ORDER — IPRATROPIUM BROMIDE AND ALBUTEROL SULFATE 2.5; .5 MG/3ML; MG/3ML
1 SOLUTION RESPIRATORY (INHALATION) EVERY 4 HOURS
Status: DISCONTINUED | OUTPATIENT
Start: 2020-01-01 | End: 2020-01-01

## 2020-01-01 RX ORDER — ACETAMINOPHEN 650 MG/1
650 SUPPOSITORY RECTAL EVERY 6 HOURS PRN
Status: DISCONTINUED | OUTPATIENT
Start: 2020-01-01 | End: 2020-01-01

## 2020-01-01 RX ORDER — ASPIRIN 81 MG/1
81 TABLET ORAL DAILY
Status: DISCONTINUED | OUTPATIENT
Start: 2020-01-01 | End: 2020-01-01

## 2020-01-01 RX ORDER — CEFDINIR 300 MG/1
300 CAPSULE ORAL 2 TIMES DAILY
Qty: 14 CAPSULE | Refills: 0 | Status: SHIPPED | OUTPATIENT
Start: 2020-01-01 | End: 2020-01-01

## 2020-01-01 RX ORDER — SODIUM CHLORIDE, SODIUM LACTATE, POTASSIUM CHLORIDE, CALCIUM CHLORIDE 600; 310; 30; 20 MG/100ML; MG/100ML; MG/100ML; MG/100ML
INJECTION, SOLUTION INTRAVENOUS ONCE
Status: COMPLETED | OUTPATIENT
Start: 2020-01-01 | End: 2020-01-01

## 2020-01-01 RX ORDER — ACETAMINOPHEN 325 MG/1
650 TABLET ORAL EVERY 6 HOURS PRN
Status: DISCONTINUED | OUTPATIENT
Start: 2020-01-01 | End: 2020-01-01

## 2020-01-01 RX ORDER — SODIUM CHLORIDE 9 MG/ML
10 INJECTION INTRAVENOUS DAILY
Status: DISCONTINUED | OUTPATIENT
Start: 2020-01-01 | End: 2020-01-01

## 2020-01-01 RX ORDER — CYANOCOBALAMIN 1000 UG/ML
1000 INJECTION, SOLUTION INTRAMUSCULAR; SUBCUTANEOUS
COMMUNITY
Start: 2020-01-01

## 2020-01-01 RX ORDER — POLYVINYL ALCOHOL 14 MG/ML
1 SOLUTION/ DROPS OPHTHALMIC PRN
COMMUNITY
Start: 2016-07-20

## 2020-01-01 RX ORDER — SODIUM CHLORIDE 450 MG/100ML
INJECTION, SOLUTION INTRAVENOUS CONTINUOUS
Status: DISCONTINUED | OUTPATIENT
Start: 2020-01-01 | End: 2020-01-01

## 2020-01-01 RX ORDER — PANTOPRAZOLE SODIUM 40 MG/10ML
40 INJECTION, POWDER, LYOPHILIZED, FOR SOLUTION INTRAVENOUS DAILY
Status: DISCONTINUED | OUTPATIENT
Start: 2020-01-01 | End: 2020-01-01

## 2020-01-01 RX ORDER — IPRATROPIUM BROMIDE AND ALBUTEROL SULFATE 2.5; .5 MG/3ML; MG/3ML
SOLUTION RESPIRATORY (INHALATION)
Qty: 3 ML | Refills: 10 | Status: SHIPPED
Start: 2020-01-01 | End: 2020-01-01 | Stop reason: ALTCHOICE

## 2020-01-01 RX ORDER — ACETAMINOPHEN 500 MG
1000 TABLET ORAL ONCE
Status: COMPLETED | OUTPATIENT
Start: 2020-01-01 | End: 2020-01-01

## 2020-01-01 RX ORDER — RIVASTIGMINE 4.6 MG/24H
1 PATCH, EXTENDED RELEASE TRANSDERMAL DAILY
COMMUNITY
Start: 2016-07-20

## 2020-01-01 RX ORDER — ACETYLCYSTEINE 100 MG/ML
4 SOLUTION ORAL; RESPIRATORY (INHALATION) 2 TIMES DAILY
Status: DISCONTINUED | OUTPATIENT
Start: 2020-01-01 | End: 2020-01-01

## 2020-01-01 RX ORDER — NICOTINE POLACRILEX 4 MG
15 LOZENGE BUCCAL PRN
Status: DISCONTINUED | OUTPATIENT
Start: 2020-01-01 | End: 2020-01-01

## 2020-01-01 RX ORDER — IPRATROPIUM BROMIDE AND ALBUTEROL SULFATE 2.5; .5 MG/3ML; MG/3ML
1 SOLUTION RESPIRATORY (INHALATION)
Status: DISCONTINUED | OUTPATIENT
Start: 2020-01-01 | End: 2020-01-01

## 2020-01-01 RX ORDER — ACETYLCYSTEINE 100 MG/ML
4 SOLUTION ORAL; RESPIRATORY (INHALATION) EVERY 4 HOURS
Status: DISCONTINUED | OUTPATIENT
Start: 2020-01-01 | End: 2020-01-01

## 2020-01-01 RX ORDER — SODIUM CHLORIDE, SODIUM LACTATE, POTASSIUM CHLORIDE, AND CALCIUM CHLORIDE .6; .31; .03; .02 G/100ML; G/100ML; G/100ML; G/100ML
1000 INJECTION, SOLUTION INTRAVENOUS ONCE
Status: COMPLETED | OUTPATIENT
Start: 2020-01-01 | End: 2020-01-01

## 2020-01-01 RX ORDER — 0.9 % SODIUM CHLORIDE 0.9 %
1000 INTRAVENOUS SOLUTION INTRAVENOUS ONCE
Status: COMPLETED | OUTPATIENT
Start: 2020-01-01 | End: 2020-01-01

## 2020-01-01 RX ORDER — DEXTROMETHORPHAN HBR. AND GUAIFENESIN 10; 100 MG/5ML; MG/5ML
10 SOLUTION ORAL EVERY 4 HOURS PRN
COMMUNITY
Start: 2016-07-25

## 2020-01-01 RX ORDER — METHYLPREDNISOLONE SODIUM SUCCINATE 40 MG/ML
40 INJECTION, POWDER, LYOPHILIZED, FOR SOLUTION INTRAMUSCULAR; INTRAVENOUS ONCE
Status: COMPLETED | OUTPATIENT
Start: 2020-01-01 | End: 2020-01-01

## 2020-01-01 RX ORDER — ONDANSETRON 4 MG/1
4 TABLET, FILM COATED ORAL EVERY 8 HOURS PRN
COMMUNITY

## 2020-01-01 RX ORDER — SODIUM CHLORIDE 9 MG/ML
INJECTION, SOLUTION INTRAVENOUS EVERY 24 HOURS
Status: DISCONTINUED | OUTPATIENT
Start: 2020-01-01 | End: 2020-01-01

## 2020-01-01 RX ORDER — DEXTROSE MONOHYDRATE 25 G/50ML
12.5 INJECTION, SOLUTION INTRAVENOUS PRN
Status: DISCONTINUED | OUTPATIENT
Start: 2020-01-01 | End: 2020-01-01

## 2020-01-01 RX ORDER — FUROSEMIDE 20 MG/1
20 TABLET ORAL DAILY PRN
COMMUNITY

## 2020-01-01 RX ORDER — SODIUM CHLORIDE 0.9 % (FLUSH) 0.9 %
10 SYRINGE (ML) INJECTION PRN
Status: DISCONTINUED | OUTPATIENT
Start: 2020-01-01 | End: 2020-01-01

## 2020-01-01 RX ORDER — LEVOFLOXACIN 500 MG/1
500 TABLET, FILM COATED ORAL DAILY
Qty: 10 TABLET | Refills: 0 | Status: SHIPPED
Start: 2020-01-01 | End: 2020-01-01 | Stop reason: ALTCHOICE

## 2020-01-01 RX ORDER — VITAMIN B COMPLEX
1000 TABLET ORAL DAILY
Status: DISCONTINUED | OUTPATIENT
Start: 2020-01-01 | End: 2020-01-01

## 2020-01-01 RX ADMIN — SODIUM CHLORIDE: 4.5 INJECTION, SOLUTION INTRAVENOUS at 11:13

## 2020-01-01 RX ADMIN — NOREPINEPHRINE BITARTRATE 5 MCG/MIN: 1 INJECTION INTRAVENOUS at 22:36

## 2020-01-01 RX ADMIN — SODIUM CHLORIDE: 4.5 INJECTION, SOLUTION INTRAVENOUS at 12:22

## 2020-01-01 RX ADMIN — HEPARIN SODIUM 5000 UNITS: 5000 INJECTION INTRAVENOUS; SUBCUTANEOUS at 21:41

## 2020-01-01 RX ADMIN — ASPIRIN 81 MG: 81 TABLET, FILM COATED ORAL at 12:38

## 2020-01-01 RX ADMIN — NOREPINEPHRINE BITARTRATE 5 MCG/MIN: 1 INJECTION INTRAVENOUS at 01:43

## 2020-01-01 RX ADMIN — MIDODRINE HYDROCHLORIDE 10 MG: 5 TABLET ORAL at 12:12

## 2020-01-01 RX ADMIN — ALBUMIN (HUMAN) 50 G: 0.25 INJECTION, SOLUTION INTRAVENOUS at 01:03

## 2020-01-01 RX ADMIN — DOXYCYCLINE 100 MG: 100 INJECTION, POWDER, LYOPHILIZED, FOR SOLUTION INTRAVENOUS at 12:10

## 2020-01-01 RX ADMIN — SODIUM CHLORIDE, PRESERVATIVE FREE 10 ML: 5 INJECTION INTRAVENOUS at 09:21

## 2020-01-01 RX ADMIN — SODIUM CHLORIDE, POTASSIUM CHLORIDE, SODIUM LACTATE AND CALCIUM CHLORIDE 1000 ML: 600; 310; 30; 20 INJECTION, SOLUTION INTRAVENOUS at 11:13

## 2020-01-01 RX ADMIN — CEFEPIME HYDROCHLORIDE 2 G: 2 INJECTION, POWDER, FOR SOLUTION INTRAVENOUS at 02:24

## 2020-01-01 RX ADMIN — IPRATROPIUM BROMIDE AND ALBUTEROL SULFATE 1 AMPULE: .5; 3 SOLUTION RESPIRATORY (INHALATION) at 09:27

## 2020-01-01 RX ADMIN — Medication 1000 UNITS: at 14:09

## 2020-01-01 RX ADMIN — SODIUM CHLORIDE 25 ML: 9 INJECTION, SOLUTION INTRAVENOUS at 05:00

## 2020-01-01 RX ADMIN — DEXTROSE MONOHYDRATE: 50 INJECTION, SOLUTION INTRAVENOUS at 02:00

## 2020-01-01 RX ADMIN — HEPARIN SODIUM 5000 UNITS: 5000 INJECTION INTRAVENOUS; SUBCUTANEOUS at 13:15

## 2020-01-01 RX ADMIN — ACETYLCYSTEINE 400 MG: 100 INHALANT RESPIRATORY (INHALATION) at 05:36

## 2020-01-01 RX ADMIN — ALBUTEROL SULFATE 2.5 MG: 2.5 SOLUTION RESPIRATORY (INHALATION) at 06:24

## 2020-01-01 RX ADMIN — Medication 10 ML: at 09:53

## 2020-01-01 RX ADMIN — SODIUM CHLORIDE 2000 ML: 9 INJECTION, SOLUTION INTRAVENOUS at 18:59

## 2020-01-01 RX ADMIN — SODIUM CHLORIDE: 4.5 INJECTION, SOLUTION INTRAVENOUS at 23:47

## 2020-01-01 RX ADMIN — MORPHINE SULFATE 1 MG/HR: 10 INJECTION INTRAVENOUS at 15:46

## 2020-01-01 RX ADMIN — ALBUTEROL SULFATE 2.5 MG: 2.5 SOLUTION RESPIRATORY (INHALATION) at 16:02

## 2020-01-01 RX ADMIN — BARIUM SULFATE 45 ML: 400 SUSPENSION ORAL at 11:54

## 2020-01-01 RX ADMIN — HYDROCORTISONE SODIUM SUCCINATE 50 MG: 100 INJECTION, POWDER, FOR SOLUTION INTRAMUSCULAR; INTRAVENOUS at 16:14

## 2020-01-01 RX ADMIN — HYDROCORTISONE SODIUM SUCCINATE 50 MG: 100 INJECTION, POWDER, FOR SOLUTION INTRAMUSCULAR; INTRAVENOUS at 23:00

## 2020-01-01 RX ADMIN — HEPARIN SODIUM 5000 UNITS: 5000 INJECTION INTRAVENOUS; SUBCUTANEOUS at 05:47

## 2020-01-01 RX ADMIN — HYDROCORTISONE SODIUM SUCCINATE 50 MG: 100 INJECTION, POWDER, FOR SOLUTION INTRAMUSCULAR; INTRAVENOUS at 22:12

## 2020-01-01 RX ADMIN — HYDROCORTISONE SODIUM SUCCINATE 50 MG: 100 INJECTION, POWDER, FOR SOLUTION INTRAMUSCULAR; INTRAVENOUS at 09:53

## 2020-01-01 RX ADMIN — DOXYCYCLINE 100 MG: 100 INJECTION, POWDER, LYOPHILIZED, FOR SOLUTION INTRAVENOUS at 13:17

## 2020-01-01 RX ADMIN — METHYLPREDNISOLONE SODIUM SUCCINATE 40 MG: 40 INJECTION, POWDER, FOR SOLUTION INTRAMUSCULAR; INTRAVENOUS at 23:09

## 2020-01-01 RX ADMIN — MEROPENEM 1 G: 1 INJECTION, POWDER, FOR SOLUTION INTRAVENOUS at 05:43

## 2020-01-01 RX ADMIN — ASPIRIN 81 MG: 81 TABLET, FILM COATED ORAL at 10:59

## 2020-01-01 RX ADMIN — DOXYCYCLINE 100 MG: 100 INJECTION, POWDER, LYOPHILIZED, FOR SOLUTION INTRAVENOUS at 14:07

## 2020-01-01 RX ADMIN — VANCOMYCIN HYDROCHLORIDE 1000 MG: 1 INJECTION, POWDER, LYOPHILIZED, FOR SOLUTION INTRAVENOUS at 20:30

## 2020-01-01 RX ADMIN — FUROSEMIDE 40 MG: 10 INJECTION, SOLUTION INTRAMUSCULAR; INTRAVENOUS at 16:32

## 2020-01-01 RX ADMIN — ACETYLCYSTEINE 400 MG: 100 INHALANT RESPIRATORY (INHALATION) at 00:00

## 2020-01-01 RX ADMIN — IPRATROPIUM BROMIDE AND ALBUTEROL SULFATE 1 AMPULE: .5; 3 SOLUTION RESPIRATORY (INHALATION) at 09:09

## 2020-01-01 RX ADMIN — HYDROCORTISONE SODIUM SUCCINATE 50 MG: 100 INJECTION, POWDER, FOR SOLUTION INTRAMUSCULAR; INTRAVENOUS at 14:33

## 2020-01-01 RX ADMIN — PANTOPRAZOLE SODIUM 40 MG: 40 INJECTION, POWDER, FOR SOLUTION INTRAVENOUS at 11:20

## 2020-01-01 RX ADMIN — HYDROCORTISONE SODIUM SUCCINATE 50 MG: 100 INJECTION, POWDER, FOR SOLUTION INTRAMUSCULAR; INTRAVENOUS at 00:25

## 2020-01-01 RX ADMIN — MEROPENEM 1 G: 1 INJECTION, POWDER, FOR SOLUTION INTRAVENOUS at 21:15

## 2020-01-01 RX ADMIN — PANTOPRAZOLE SODIUM 40 MG: 40 TABLET, DELAYED RELEASE ORAL at 10:59

## 2020-01-01 RX ADMIN — MEROPENEM 1 G: 1 INJECTION, POWDER, FOR SOLUTION INTRAVENOUS at 22:22

## 2020-01-01 RX ADMIN — SODIUM CHLORIDE: 4.5 INJECTION, SOLUTION INTRAVENOUS at 19:13

## 2020-01-01 RX ADMIN — HYDROCORTISONE SODIUM SUCCINATE 50 MG: 100 INJECTION, POWDER, FOR SOLUTION INTRAMUSCULAR; INTRAVENOUS at 05:46

## 2020-01-01 RX ADMIN — DOXYCYCLINE 100 MG: 100 INJECTION, POWDER, LYOPHILIZED, FOR SOLUTION INTRAVENOUS at 23:00

## 2020-01-01 RX ADMIN — CEFEPIME HYDROCHLORIDE 2 G: 2 INJECTION, POWDER, FOR SOLUTION INTRAVENOUS at 13:15

## 2020-01-01 RX ADMIN — ACETAMINOPHEN 1000 MG: 500 TABLET, FILM COATED ORAL at 19:35

## 2020-01-01 RX ADMIN — DOXYCYCLINE 100 MG: 100 INJECTION, POWDER, LYOPHILIZED, FOR SOLUTION INTRAVENOUS at 00:14

## 2020-01-01 RX ADMIN — NOREPINEPHRINE BITARTRATE 5 MCG/MIN: 1 INJECTION INTRAVENOUS at 00:29

## 2020-01-01 RX ADMIN — SODIUM CHLORIDE: 4.5 INJECTION, SOLUTION INTRAVENOUS at 18:24

## 2020-01-01 RX ADMIN — HEPARIN SODIUM 5000 UNITS: 5000 INJECTION INTRAVENOUS; SUBCUTANEOUS at 22:32

## 2020-01-01 RX ADMIN — VANCOMYCIN HYDROCHLORIDE 1000 MG: 1 INJECTION, POWDER, LYOPHILIZED, FOR SOLUTION INTRAVENOUS at 09:21

## 2020-01-01 RX ADMIN — DOXYCYCLINE 100 MG: 100 INJECTION, POWDER, LYOPHILIZED, FOR SOLUTION INTRAVENOUS at 01:00

## 2020-01-01 RX ADMIN — HYDROCORTISONE SODIUM SUCCINATE 50 MG: 100 INJECTION, POWDER, FOR SOLUTION INTRAMUSCULAR; INTRAVENOUS at 21:14

## 2020-01-01 RX ADMIN — CEFEPIME 2 G: 2 INJECTION, POWDER, FOR SOLUTION INTRAMUSCULAR; INTRAVENOUS at 00:45

## 2020-01-01 RX ADMIN — SODIUM CHLORIDE 500 ML: 9 INJECTION, SOLUTION INTRAVENOUS at 20:18

## 2020-01-01 RX ADMIN — SODIUM CHLORIDE: 4.5 INJECTION, SOLUTION INTRAVENOUS at 01:00

## 2020-01-01 RX ADMIN — HYDROCORTISONE SODIUM SUCCINATE 50 MG: 100 INJECTION, POWDER, FOR SOLUTION INTRAMUSCULAR; INTRAVENOUS at 09:21

## 2020-01-01 RX ADMIN — HEPARIN SODIUM 5000 UNITS: 5000 INJECTION INTRAVENOUS; SUBCUTANEOUS at 13:17

## 2020-01-01 RX ADMIN — SODIUM CHLORIDE 25 ML: 9 INJECTION, SOLUTION INTRAVENOUS at 16:45

## 2020-01-01 RX ADMIN — IPRATROPIUM BROMIDE AND ALBUTEROL SULFATE 1 AMPULE: .5; 3 SOLUTION RESPIRATORY (INHALATION) at 13:04

## 2020-01-01 RX ADMIN — ASPIRIN 81 MG: 81 TABLET, FILM COATED ORAL at 08:45

## 2020-01-01 RX ADMIN — Medication 10 ML: at 20:35

## 2020-01-01 RX ADMIN — ALBUTEROL SULFATE 2.5 MG: 2.5 SOLUTION RESPIRATORY (INHALATION) at 12:43

## 2020-01-01 RX ADMIN — SODIUM CHLORIDE 250 ML: 4.5 INJECTION, SOLUTION INTRAVENOUS at 11:16

## 2020-01-01 RX ADMIN — ACETYLCYSTEINE 400 MG: 100 SOLUTION ORAL; RESPIRATORY (INHALATION) at 16:03

## 2020-01-01 RX ADMIN — PANTOPRAZOLE SODIUM 40 MG: 40 INJECTION, POWDER, FOR SOLUTION INTRAVENOUS at 09:21

## 2020-01-01 RX ADMIN — Medication 10 ML: at 22:11

## 2020-01-01 RX ADMIN — ALBUMIN (HUMAN) 50 G: 0.25 INJECTION, SOLUTION INTRAVENOUS at 14:10

## 2020-01-01 RX ADMIN — IPRATROPIUM BROMIDE AND ALBUTEROL SULFATE 1 AMPULE: .5; 3 SOLUTION RESPIRATORY (INHALATION) at 06:04

## 2020-01-01 RX ADMIN — IPRATROPIUM BROMIDE AND ALBUTEROL SULFATE 1 AMPULE: .5; 3 SOLUTION RESPIRATORY (INHALATION) at 13:16

## 2020-01-01 RX ADMIN — ALBUTEROL SULFATE 2.5 MG: 2.5 SOLUTION RESPIRATORY (INHALATION) at 02:57

## 2020-01-01 RX ADMIN — DOXYCYCLINE 100 MG: 100 INJECTION, POWDER, LYOPHILIZED, FOR SOLUTION INTRAVENOUS at 11:20

## 2020-01-01 RX ADMIN — CEFEPIME 2 G: 2 INJECTION, POWDER, FOR SOLUTION INTRAVENOUS at 00:59

## 2020-01-01 RX ADMIN — ALBUTEROL SULFATE 2.5 MG: 2.5 SOLUTION RESPIRATORY (INHALATION) at 09:18

## 2020-01-01 RX ADMIN — SODIUM CHLORIDE: 9 INJECTION, SOLUTION INTRAVENOUS at 06:31

## 2020-01-01 RX ADMIN — IPRATROPIUM BROMIDE AND ALBUTEROL SULFATE 1 AMPULE: .5; 3 SOLUTION RESPIRATORY (INHALATION) at 18:27

## 2020-01-01 RX ADMIN — HEPARIN SODIUM 5000 UNITS: 5000 INJECTION INTRAVENOUS; SUBCUTANEOUS at 13:58

## 2020-01-01 RX ADMIN — SODIUM CHLORIDE, PRESERVATIVE FREE 10 ML: 5 INJECTION INTRAVENOUS at 08:46

## 2020-01-01 RX ADMIN — BARIUM SULFATE 45 G: 0.6 CREAM ORAL at 11:54

## 2020-01-01 RX ADMIN — IPRATROPIUM BROMIDE AND ALBUTEROL SULFATE 1 AMPULE: .5; 3 SOLUTION RESPIRATORY (INHALATION) at 18:51

## 2020-01-01 RX ADMIN — CEFEPIME 2 G: 2 INJECTION, POWDER, FOR SOLUTION INTRAVENOUS at 19:00

## 2020-01-01 RX ADMIN — SODIUM CHLORIDE, PRESERVATIVE FREE 10 ML: 5 INJECTION INTRAVENOUS at 20:37

## 2020-01-01 RX ADMIN — PANTOPRAZOLE SODIUM 40 MG: 40 INJECTION, POWDER, FOR SOLUTION INTRAVENOUS at 09:53

## 2020-01-01 RX ADMIN — Medication 10 ML: at 09:22

## 2020-01-01 RX ADMIN — MIDODRINE HYDROCHLORIDE 10 MG: 5 TABLET ORAL at 10:59

## 2020-01-01 RX ADMIN — DOXYCYCLINE 100 MG: 100 INJECTION, POWDER, LYOPHILIZED, FOR SOLUTION INTRAVENOUS at 12:13

## 2020-01-01 RX ADMIN — BARIUM SULFATE 45 G: 0.81 POWDER, FOR SUSPENSION ORAL at 11:53

## 2020-01-01 RX ADMIN — Medication 10 ML: at 23:59

## 2020-01-01 RX ADMIN — Medication 10 ML: at 00:25

## 2020-01-01 RX ADMIN — PANTOPRAZOLE SODIUM 40 MG: 40 INJECTION, POWDER, FOR SOLUTION INTRAVENOUS at 08:14

## 2020-01-01 RX ADMIN — IPRATROPIUM BROMIDE AND ALBUTEROL SULFATE 1 AMPULE: .5; 3 SOLUTION RESPIRATORY (INHALATION) at 09:15

## 2020-01-01 RX ADMIN — PANTOPRAZOLE SODIUM 40 MG: 40 INJECTION, POWDER, FOR SOLUTION INTRAVENOUS at 08:45

## 2020-01-01 RX ADMIN — HEPARIN SODIUM 5000 UNITS: 5000 INJECTION INTRAVENOUS; SUBCUTANEOUS at 06:06

## 2020-01-01 RX ADMIN — SODIUM CHLORIDE, PRESERVATIVE FREE 10 ML: 5 INJECTION INTRAVENOUS at 20:36

## 2020-01-01 RX ADMIN — IPRATROPIUM BROMIDE AND ALBUTEROL SULFATE 1 AMPULE: .5; 3 SOLUTION RESPIRATORY (INHALATION) at 16:05

## 2020-01-01 RX ADMIN — POTASSIUM CHLORIDE AND SODIUM CHLORIDE: 900; 300 INJECTION, SOLUTION INTRAVENOUS at 14:10

## 2020-01-01 RX ADMIN — IPRATROPIUM BROMIDE AND ALBUTEROL SULFATE 1 AMPULE: .5; 3 SOLUTION RESPIRATORY (INHALATION) at 04:43

## 2020-01-01 RX ADMIN — HEPARIN SODIUM 5000 UNITS: 5000 INJECTION INTRAVENOUS; SUBCUTANEOUS at 22:13

## 2020-01-01 RX ADMIN — MEROPENEM 1 G: 1 INJECTION, POWDER, FOR SOLUTION INTRAVENOUS at 06:06

## 2020-01-01 RX ADMIN — SODIUM CHLORIDE, PRESERVATIVE FREE 10 ML: 5 INJECTION INTRAVENOUS at 08:17

## 2020-01-01 RX ADMIN — HEPARIN SODIUM 5000 UNITS: 5000 INJECTION INTRAVENOUS; SUBCUTANEOUS at 21:14

## 2020-01-01 RX ADMIN — VANCOMYCIN HYDROCHLORIDE 1000 MG: 1 INJECTION, POWDER, LYOPHILIZED, FOR SOLUTION INTRAVENOUS at 22:13

## 2020-01-01 RX ADMIN — ALBUTEROL SULFATE 2.5 MG: 2.5 SOLUTION RESPIRATORY (INHALATION) at 13:31

## 2020-01-01 RX ADMIN — CEFEPIME 2 G: 2 INJECTION, POWDER, FOR SOLUTION INTRAVENOUS at 02:20

## 2020-01-01 RX ADMIN — SODIUM CHLORIDE, PRESERVATIVE FREE 10 ML: 5 INJECTION INTRAVENOUS at 20:39

## 2020-01-01 RX ADMIN — SODIUM CHLORIDE, POTASSIUM CHLORIDE, SODIUM LACTATE AND CALCIUM CHLORIDE 1000 ML: 600; 310; 30; 20 INJECTION, SOLUTION INTRAVENOUS at 21:19

## 2020-01-01 RX ADMIN — MIDODRINE HYDROCHLORIDE 10 MG: 5 TABLET ORAL at 14:09

## 2020-01-01 RX ADMIN — Medication 10 ML: at 14:27

## 2020-01-01 RX ADMIN — DEXTROSE MONOHYDRATE 50 MG: 50 INJECTION, SOLUTION INTRAVENOUS at 00:17

## 2020-01-01 RX ADMIN — DOXYCYCLINE 100 MG: 100 INJECTION, POWDER, LYOPHILIZED, FOR SOLUTION INTRAVENOUS at 01:12

## 2020-01-01 RX ADMIN — HEPARIN SODIUM 5000 UNITS: 5000 INJECTION INTRAVENOUS; SUBCUTANEOUS at 05:42

## 2020-01-01 RX ADMIN — DEXTROSE MONOHYDRATE 50 MG: 50 INJECTION, SOLUTION INTRAVENOUS at 06:11

## 2020-01-01 RX ADMIN — ACETYLCYSTEINE 400 MG: 100 SOLUTION ORAL; RESPIRATORY (INHALATION) at 12:44

## 2020-01-01 RX ADMIN — SODIUM CHLORIDE, PRESERVATIVE FREE 10 ML: 5 INJECTION INTRAVENOUS at 09:53

## 2020-01-01 RX ADMIN — SODIUM CHLORIDE: 4.5 INJECTION, SOLUTION INTRAVENOUS at 08:46

## 2020-01-01 RX ADMIN — IPRATROPIUM BROMIDE AND ALBUTEROL SULFATE 1 AMPULE: .5; 3 SOLUTION RESPIRATORY (INHALATION) at 23:25

## 2020-01-01 RX ADMIN — HEPARIN SODIUM 5000 UNITS: 5000 INJECTION INTRAVENOUS; SUBCUTANEOUS at 06:41

## 2020-01-01 RX ADMIN — Medication 10 ML: at 08:46

## 2020-01-01 RX ADMIN — IPRATROPIUM BROMIDE AND ALBUTEROL SULFATE 1 AMPULE: .5; 3 SOLUTION RESPIRATORY (INHALATION) at 05:36

## 2020-01-01 RX ADMIN — DOXYCYCLINE 100 MG: 100 INJECTION, POWDER, LYOPHILIZED, FOR SOLUTION INTRAVENOUS at 14:16

## 2020-01-01 RX ADMIN — HEPARIN SODIUM 5000 UNITS: 5000 INJECTION INTRAVENOUS; SUBCUTANEOUS at 22:30

## 2020-01-01 RX ADMIN — IPRATROPIUM BROMIDE AND ALBUTEROL SULFATE 1 AMPULE: .5; 3 SOLUTION RESPIRATORY (INHALATION) at 14:55

## 2020-01-01 RX ADMIN — Medication 10 ML: at 21:20

## 2020-01-01 RX ADMIN — DEXTROSE MONOHYDRATE 50 MG: 50 INJECTION, SOLUTION INTRAVENOUS at 22:29

## 2020-01-01 RX ADMIN — FUROSEMIDE 40 MG: 10 INJECTION, SOLUTION INTRAMUSCULAR; INTRAVENOUS at 23:09

## 2020-01-01 RX ADMIN — VANCOMYCIN HYDROCHLORIDE 1000 MG: 1 INJECTION, POWDER, LYOPHILIZED, FOR SOLUTION INTRAVENOUS at 20:53

## 2020-01-01 RX ADMIN — ALBUMIN (HUMAN) 50 G: 0.25 INJECTION, SOLUTION INTRAVENOUS at 16:33

## 2020-01-01 RX ADMIN — ALBUMIN (HUMAN) 50 G: 0.25 INJECTION, SOLUTION INTRAVENOUS at 09:52

## 2020-01-01 RX ADMIN — SODIUM CHLORIDE: 9 INJECTION, SOLUTION INTRAVENOUS at 04:27

## 2020-01-01 RX ADMIN — MEROPENEM 1 G: 1 INJECTION, POWDER, FOR SOLUTION INTRAVENOUS at 14:33

## 2020-01-01 RX ADMIN — SODIUM CHLORIDE, PRESERVATIVE FREE 10 ML: 5 INJECTION INTRAVENOUS at 23:00

## 2020-01-01 RX ADMIN — HYDROCORTISONE SODIUM SUCCINATE 50 MG: 100 INJECTION, POWDER, FOR SOLUTION INTRAMUSCULAR; INTRAVENOUS at 08:14

## 2020-01-01 RX ADMIN — HEPARIN SODIUM 5000 UNITS: 5000 INJECTION INTRAVENOUS; SUBCUTANEOUS at 06:00

## 2020-01-01 RX ADMIN — SODIUM CHLORIDE, PRESERVATIVE FREE 10 ML: 5 INJECTION INTRAVENOUS at 20:40

## 2020-01-01 RX ADMIN — SODIUM CHLORIDE 1000 ML: 9 INJECTION, SOLUTION INTRAVENOUS at 23:01

## 2020-01-01 RX ADMIN — SODIUM CHLORIDE, PRESERVATIVE FREE 10 ML: 5 INJECTION INTRAVENOUS at 21:41

## 2020-01-01 RX ADMIN — HEPARIN SODIUM 5000 UNITS: 5000 INJECTION INTRAVENOUS; SUBCUTANEOUS at 06:26

## 2020-01-01 RX ADMIN — ALBUTEROL SULFATE 2.5 MG: 2.5 SOLUTION RESPIRATORY (INHALATION) at 22:00

## 2020-01-01 RX ADMIN — DOXYCYCLINE 100 MG: 100 INJECTION, POWDER, LYOPHILIZED, FOR SOLUTION INTRAVENOUS at 23:58

## 2020-01-01 RX ADMIN — NOREPINEPHRINE BITARTRATE 2 MCG/MIN: 1 INJECTION INTRAVENOUS at 21:09

## 2020-01-01 RX ADMIN — VANCOMYCIN HYDROCHLORIDE 1750 MG: 10 INJECTION, POWDER, LYOPHILIZED, FOR SOLUTION INTRAVENOUS at 20:15

## 2020-01-01 RX ADMIN — ALBUMIN (HUMAN) 50 G: 0.25 INJECTION, SOLUTION INTRAVENOUS at 18:59

## 2020-01-01 RX ADMIN — IPRATROPIUM BROMIDE AND ALBUTEROL SULFATE 1 AMPULE: .5; 3 SOLUTION RESPIRATORY (INHALATION) at 06:21

## 2020-01-01 RX ADMIN — ACETYLCYSTEINE 400 MG: 100 SOLUTION ORAL; RESPIRATORY (INHALATION) at 09:02

## 2020-01-01 RX ADMIN — HEPARIN SODIUM 5000 UNITS: 5000 INJECTION INTRAVENOUS; SUBCUTANEOUS at 14:16

## 2020-01-01 RX ADMIN — Medication 10 ML: at 08:14

## 2020-01-01 RX ADMIN — HEPARIN SODIUM 5000 UNITS: 5000 INJECTION INTRAVENOUS; SUBCUTANEOUS at 14:30

## 2020-01-01 RX ADMIN — ALBUMIN (HUMAN) 50 G: 0.25 INJECTION, SOLUTION INTRAVENOUS at 01:12

## 2020-01-01 RX ADMIN — MIDODRINE HYDROCHLORIDE 10 MG: 5 TABLET ORAL at 18:37

## 2020-01-01 RX ADMIN — NOREPINEPHRINE BITARTRATE 5 MCG/MIN: 1 INJECTION, SOLUTION, CONCENTRATE INTRAVENOUS at 01:38

## 2020-01-01 RX ADMIN — SODIUM CHLORIDE, PRESERVATIVE FREE 10 ML: 5 INJECTION INTRAVENOUS at 11:21

## 2020-01-01 RX ADMIN — ALBUTEROL SULFATE 2.5 MG: 2.5 SOLUTION RESPIRATORY (INHALATION) at 09:03

## 2020-01-01 RX ADMIN — CEFEPIME HYDROCHLORIDE 2 G: 2 INJECTION, POWDER, FOR SOLUTION INTRAVENOUS at 14:27

## 2020-01-01 ASSESSMENT — PAIN SCALES - PAIN ASSESSMENT IN ADVANCED DEMENTIA (PAINAD)
BODYLANGUAGE: 0
BREATHING: 0
FACIALEXPRESSION: 0
FACIALEXPRESSION: 0
NEGVOCALIZATION: 0
CONSOLABILITY: 0
TOTALSCORE: 0
TOTALSCORE: 0
CONSOLABILITY: 0
NEGVOCALIZATION: 0
BODYLANGUAGE: 0
CONSOLABILITY: 0
TOTALSCORE: 0
FACIALEXPRESSION: 0
FACIALEXPRESSION: 0
TOTALSCORE: 0
BREATHING: 0
CONSOLABILITY: 0
FACIALEXPRESSION: 0
BREATHING: 0
CONSOLABILITY: 0
BODYLANGUAGE: 0
TOTALSCORE: 0
FACIALEXPRESSION: 0
FACIALEXPRESSION: 0
TOTALSCORE: 0
NEGVOCALIZATION: 0
CONSOLABILITY: 0
CONSOLABILITY: 0
TOTALSCORE: 0
TOTALSCORE: 0
NEGVOCALIZATION: 0
NEGVOCALIZATION: 0
BREATHING: 0
BREATHING: 0
CONSOLABILITY: 0
FACIALEXPRESSION: 0
BREATHING: 0
BODYLANGUAGE: 0
NEGVOCALIZATION: 0

## 2020-01-01 ASSESSMENT — ENCOUNTER SYMPTOMS
EYES NEGATIVE: 1
RESPIRATORY NEGATIVE: 1
EYES NEGATIVE: 1
BACK PAIN: 1
RESPIRATORY NEGATIVE: 1
TACHYPNEA: 1

## 2020-01-01 ASSESSMENT — PAIN SCALES - GENERAL
PAINLEVEL_OUTOF10: 0
PAINLEVEL_OUTOF10: 8
PAINLEVEL_OUTOF10: 0

## 2020-01-06 PROBLEM — B02.9 HERPES ZOSTER WITHOUT COMPLICATION: Status: RESOLVED | Noted: 2019-09-27 | Resolved: 2020-01-01

## 2020-01-06 NOTE — PROGRESS NOTES
bronchitis ([facility] [address] History of recurrent pneumonia; Tinea unguium; Peripheral vascular disease, unspecified ([facility] [address] Pain in left toe(s); Pain in toe of right foot; Difficulty walking; Moderate chronic obstructive pulmonary disease ([address] and Chronic kidney disease on their problem list.     Diagnoses and all orders for this visit:    Essential hypertension    Moderate chronic obstructive pulmonary disease (HCC)    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 (HCC)    Stage 3 chronic kidney disease ([facility] [address]  -     BASIC METABOLIC PANEL; Future  -     BRAIN NATRIURETIC PEPTIDE; Future    Urinary retention    PLAN:  BMP and BNP labs. May consider prophylactic atb if UTIs become very frequent. Continue O2 (2-4 LPM per NC continuous every shift). Use nebulizer txs QID. Monitor BMP and BNP q 3 months next in April. Use walker while ambulating to prevent falls. Continue other meds as per Rx List.   Recheck 1 month. 40 minutes spent on visit, 25 minutes regarding pulmonary, dementia, cardiac, HTN and depression disease processes, treatment options, meds and coordination of care with CHAR. Current Outpatient Medications   Medication Sig Dispense Refill    rivastigmine (EXELON) 4.6 MG/24HR       dextromethorphan-guaiFENesin (QC TUSSIN DM COUGH/CONGESTION)  MG/5ML syrup Take 10 mLs by mouth every 4 hours as needed for Cough      promethazine (PHENERGAN) 25 MG/ML injection Inject 6.25 mg into the muscle every 6 hours as needed (nausea/vomiting)      mineral oil-hydrophilic petrolatum (AQUAPHOR) ointment Apply to dry skin area topically as needed daily.       polyvinyl alcohol (ARTIFICIAL TEARS) 1.4 % ophthalmic solution Place 1 drop into both eyes as needed for Dry Eyes      bisacodyl (BISAC-EVAC) 10 MG suppository Place 10 mg rectally daily as needed for Constipation      MUCINEX 600 MG extended release tablet Take 1 tablet by mouth 2 times daily 60 tablet 11    ipratropium-albuterol (DUONEB) 0.5-2.5 (3) MG/3ML SOLN nebulizer solution INHALE 1 UNIT DOSE VIA NEBULIZER 4 TIMES DAILY AS NEEDED. *VIALS MUST STAY IN FOIL POUCH* ONCE REMOVED USE WITHIN 1 WEEK* 360 mL 11    OLANZapine (ZYPREXA) 5 MG tablet TAKE 1 TABLET BY MOUTH DAILY AT BEDTIME 30 tablet 11    mirtazapine (REMERON) 15 MG tablet TAKE ONE TABLET BY MOUTH ONCE DAILY AT BEDTIME. 31 tablet 10    tamsulosin (FLOMAX) 0.4 MG capsule TAKE 1 CAPSULE BY MOUTH DAILY *DO NOT CRUSH, CHEW, OR OPEN CAPSULE* 30 capsule 10    sertraline (ZOLOFT) 50 MG tablet TAKE ONE(1) TABLET BY MOUTH DAILY **STO 31 tablet 10    bethanechol (URECHOLINE) 10 MG tablet TAKE 1 TABLET BY MOUTH 3 TIMES DAILY 90 tablet 10    ASPIRIN LOW DOSE 81 MG EC tablet Take 1 tablet by mouth daily  11    ondansetron (ZOFRAN) 4 MG tablet TAKE 1 TABLET BY MOUTH THREE TIMES DAILY AS NEEDED FOR N/V. 30 tablet 11    furosemide (LASIX) 20 MG tablet Take 20 mg by mouth daily      OXYGEN 2L NC; Titrate to SpO2 92-95%. 1 Device 0    magnesium hydroxide (MILK OF MAGNESIA CONCENTRATE) 2400 MG/10ML SUSP Take 2,400 mg by mouth daily as needed.  acetaminophen 650 MG TABS Take 650 mg by mouth every 6 hours as needed for Pain. 120 tablet     Ophth Irr Soln-Extraocular (EYE STREAM) SOLN Place 1 drop into both eyes as needed. No current facility-administered medications for this visit. Return in about 1 month (around 2/6/2020). An  electronic signature was used to authenticate this note.     --Shelbi Dodd PA-C on 1/6/2020 at 10:39 AM

## 2020-02-03 NOTE — PROGRESS NOTES
Resp: 20   Temp: 98.7 °F (37.1 °C)   SpO2: 100%  Comment: 3L NC O2   Weight: 164 lb (74.4 kg)   Height: 5' 7\" (1.702 m)        GEN: elderly male patient sitting in recliner in NAD with easy respirations. HEAD:  atraumatic, normocephalic. EYES:  EOMI, PERRL, L with mod-severe cataract, unable to see with L eye, bilateral conjunctiva normal.   ENT:   Fair hearing, EACs with wax, TMs nml, nasal septum midline, mild congestion, oral cavity without lesions, edentulous full dentures. NECK:  fair-good ROM, no palpable masses, no carotid bruits, no JVD. LUNGS:  No rales, rhonchi or wheezes. HEART:  RRR, no murmurs or gallops. ABD:  soft, non-tender to palp, no palp masses or HSM, normal BS. BACK: Lumbar scar, no scoliosis or kyphosis, non-tender to palp. EXTREMITIES:  No edema, no ulcerations, varicosities or erythema. No gross deformities. MUSCULOSKELETAL:  limited ROM of all joints, non tender to palp and or with movement. Very decreased AROM L arm, has good passive ROM. SKIN:  No ulcerations or breakdown, rash, ecchymosis, or other lesions. NEURO:  Able to stand with help, walks slowly with walker. No tremor, motor UEs 5/5 LEs  5/5, sensory normal, mild ataxia. PSYCH:  Pleasant and cooperative. Fluid speech, interactive and oriented to person only. No delusional statements. 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 31 tablet 10    tamsulosin (FLOMAX) 0.4 MG capsule TAKE 1 CAPSULE BY MOUTH DAILY *DO NOT CRUSH, CHEW, OR OPEN CAPSULE* 30 capsule 10    sertraline (ZOLOFT) 50 MG tablet TAKE ONE(1) TABLET BY MOUTH DAILY **STO 31 tablet 10    bethanechol (URECHOLINE) 10 MG tablet TAKE 1 TABLET BY MOUTH 3 TIMES DAILY 90 tablet 10    ASPIRIN LOW DOSE 81 MG EC tablet Take 1 tablet by mouth daily  11    ondansetron (ZOFRAN) 4 MG tablet TAKE 1 TABLET BY MOUTH THREE TIMES DAILY AS NEEDED FOR N/V. 30 tablet 11    furosemide (LASIX) 20 MG tablet Take 20 mg by mouth daily      OXYGEN 2L NC; Titrate to SpO2 92-95%. 1 Device 0    magnesium hydroxide (MILK OF MAGNESIA CONCENTRATE) 2400 MG/10ML SUSP Take 2,400 mg by mouth daily as needed.  acetaminophen 650 MG TABS Take 650 mg by mouth every 6 hours as needed for Pain. 120 tablet     Ophth Irr Soln-Extraocular (EYE STREAM) SOLN Place 1 drop into both eyes as needed. No current facility-administered medications for this visit. Return in about 1 month (around 3/4/2020). An  electronic signature was used to authenticate this note.     --Sherry Bowser MD on 2/4/2020 at 9:29 AM

## 2020-02-12 NOTE — TELEPHONE ENCOUNTER
[facility] pharmacy is going to be able to send more, so we can disregard this request.

## 2020-02-22 NOTE — PROGRESS NOTES
[id number] [facility] patient     Chief Complaint   Patient presents with    Toe Pain       Subjective:  Pretty Lyles is seen in nursing home  per nursing for foot and nail care. Pt currently has complaint of thickened, painful, elongated nails that he/she cannot manage by themselves. Pt. Relates pain to nails with shoe gear. Pt's primary care physician is Sandra Varma MD.2/4/2020  Past Medical History:   Diagnosis Date    Chronic kidney disease     [facility]) [facility]     Dementia ([facility]     Depression     Hypertension     Neuromuscular disorder ([facility]     Other disorders of kidney and ureter in diseases classified elsewhere     Pneumonia     Psychosis ([facility]        No Known Allergies  Current Outpatient Medications on File Prior to Visit   Medication Sig Dispense Refill    rivastigmine (EXELON) 4.6 MG/24HR       dextromethorphan-guaiFENesin (QC TUSSIN DM COUGH/CONGESTION)  MG/5ML syrup Take 10 mLs by mouth every 4 hours as needed for Cough      promethazine (PHENERGAN) 25 MG/ML injection Inject 6.25 mg into the muscle every 6 hours as needed (nausea/vomiting)      mineral oil-hydrophilic petrolatum (AQUAPHOR) ointment Apply to dry skin area topically as needed daily.  polyvinyl alcohol (ARTIFICIAL TEARS) 1.4 % ophthalmic solution Place 1 drop into both eyes as needed for Dry Eyes      bisacodyl (BISAC-EVAC) 10 MG suppository Place 10 mg rectally daily as needed for Constipation      MUCINEX 600 MG extended release tablet Take 1 tablet by mouth 2 times daily 60 tablet 11    ipratropium-albuterol (DUONEB) 0.5-2.5 (3) MG/3ML SOLN nebulizer solution INHALE 1 UNIT DOSE VIA NEBULIZER 4 TIMES DAILY AS NEEDED.  *VIALS MUST STAY IN FOIL POUCH* ONCE REMOVED USE WITHIN 1 WEEK* 360 mL 11    OLANZapine (ZYPREXA) 5 MG tablet TAKE 1 TABLET BY MOUTH DAILY AT BEDTIME 30 tablet 11    mirtazapine (REMERON) 15 MG tablet TAKE ONE TABLET BY MOUTH ONCE DAILY AT BEDTIME. 31 tablet 10    tamsulosin (FLOMAX) 0.4 MG capsule TAKE 1 CAPSULE BY MOUTH DAILY *DO NOT CRUSH, CHEW, OR OPEN CAPSULE* 30 capsule 10    sertraline (ZOLOFT) 50 MG tablet TAKE ONE(1) TABLET BY MOUTH DAILY **STO 31 tablet 10    bethanechol (URECHOLINE) 10 MG tablet TAKE 1 TABLET BY MOUTH 3 TIMES DAILY 90 tablet 10    ASPIRIN LOW DOSE 81 MG EC tablet Take 1 tablet by mouth daily  11    ondansetron (ZOFRAN) 4 MG tablet TAKE 1 TABLET BY MOUTH THREE TIMES DAILY AS NEEDED FOR N/V. 30 tablet 11    furosemide (LASIX) 20 MG tablet Take 20 mg by mouth daily      OXYGEN 2L NC; Titrate to SpO2 92-95%. 1 Device 0    magnesium hydroxide (MILK OF MAGNESIA CONCENTRATE) 2400 MG/10ML SUSP Take 2,400 mg by mouth daily as needed.  acetaminophen 650 MG TABS Take 650 mg by mouth every 6 hours as needed for Pain. 120 tablet     Ophth Irr Soln-Extraocular (EYE STREAM) SOLN Place 1 drop into both eyes as needed. No current facility-administered medications on file prior to visit. Review of Systems   Constitutional: Negative. HENT: Negative. Eyes: Negative. Respiratory: Negative. Cardiovascular: Positive for leg swelling. Endocrine: Negative. Genitourinary: Negative. Objective:  General: AAO x 3 in NAD.     Derm  Toenail Description  Sites of Onychomycosis Involvement (Check affected area)  [x] [x] [x] [x] [x] [x] [x] [x] [x] [x]  5 4 3 2 1 1 2 3 4 5                          Right                                        Left    Thickness  [x] [x] [x] [x] [x] [x] [x] [x] [x] [x]  5 4 3 2 1 1 2 3 4 5                         Right                                        Left    Dystrophic Changes   [x] [x] [x] [x] [x] [x] [x] [x] [x] [x]  5 4 3 2 1 1 2 3 4 5                         Right                                        Left    Color  [x] [x] [x] [x] [x] [x] [x] [x] [x] [x]  5 4 3 2 1 1 2 3 4 5                          Right Left    Incurvation/Ingrowin   [] [] [] [] [x] [x] [] [] [] []  5 4 3 2 1 1 2 3 4 5                         Right                                        Left    Inflammation/Pain   [x] [x] [x] [x] [x] [x] [x] [x] [x] [x]  5 4 3  2 1 1 2 3 4 5                         Right                                        Left      Nails that are described above are all elongated thickened pitting mycotic yellowish incurvated causing pain with both shoe gear. Palpation      Dermatologic Exam:  Skin lesion/ulceration   Skin   Callus   Musculoskeletal:     1st MPJ ROM normal, Bilateral.  Muscle strength 5/5, Bilateral.  Pain present upon palpation of toenails 1-5, Bilateral. decreased medial longitudinal arch, Bilateral.  Ankle ROM normal,Bilateral.    Dorsally contracted digits absent digits 5, Bilateral.     Vascular:   skin lower extremity loss of hair nails are dystrophic thick discoloration noted to feet    Neurological:  Sensation present to light touch to level of digits, Bilateral.    Foot Exam    General  General Appearance: appears stated age and healthy   Orientation: alert and oriented to person, place, and time   Assistance: cane use       Right Foot/Ankle     Inspection and Palpation  Ecchymosis: none  Tenderness: metatarsals   Skin Exam: skin changes and abnormal color; Neurovascular  Dorsalis pedis: 1+  Posterior tibial: absent  Saphenous nerve sensation: normal  Tibial nerve sensation: normal  Superficial peroneal nerve sensation: normal  Deep peroneal nerve sensation: normal  Sural nerve sensation: normal      Left Foot/Ankle      Inspection and Palpation  Tenderness: metatarsals   Skin Exam: skin changes and abnormal color;      Neurovascular  Dorsalis pedis: 1+  Posterior tibial: absent  Saphenous nerve sensation: normal  Tibial nerve sensation: normal  Superficial peroneal nerve sensation: normal  Deep peroneal nerve sensation: normal  Sural nerve sensation: normal             Ortho Exam    Assessment:  89 y.o. male with:   1. Tinea unguium    2. Peripheral vascular disease, unspecified ([address]    3. Pain in left toe(s)    4. Difficulty walking    5. Pain in toe of right foot     No orders of the defined types were placed in this encounter. Plan:   Pt was evaluated and examined. Patient was given personalized discharge instructions. Nails 1-10 were debrided in length and thickness sharply with a nail nipper and  without incident. Pt will follow up in 9 weeks or sooner if any problems arise. Diagnosis was discussed with the pt and all of their questions were answered in detail. Proper foot hygiene and care was discussed with the pt. Patient to check feet daily and contact the office with any questions/problems/concerns. Other comorbidity noted and will be managed by PCP. Pain waiver discussed with patient and confirmed. Debridement of all painful nails was performed with both manual and electrical debridement to prevent infection and/or ulceration. Patient tolerated the debridement well, without any complaints.   Patient was asymptomatic after debridement    2/22/2020      Electronically signed by Merry Saldivar DPM on 2/22/2020 at 9:53 AM  2/22/2020

## 2020-03-05 NOTE — PROGRESS NOTES
pneumonia; Tinea unguium; Peripheral vascular disease, unspecified ([address] Pain in left toe(s); Pain in toe of right foot; Difficulty walking; Moderate chronic obstructive pulmonary disease ([address] and Chronic kidney disease on their problem list.     Clinton Blackman was seen today for hypertension, copd and dementia. 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 Use nebulizer txs QID. Monitor BMP and BNP q 3 months next in April. May consider prophylactic atb if UTIs become very frequent. Fall prevention discussed. Continue other meds as per Rx List.   Recheck 1 month. 40 minutes spent on visit, 25 minutes regarding pulmonary, dementia, cardiac, HTN and depression disease processes, treatment options, meds and coordination of care with [facility]. Current Outpatient Medications   Medication Sig Dispense Refill    rivastigmine (EXELON) 4.6 MG/24HR       dextromethorphan-guaiFENesin (QC TUSSIN DM COUGH/CONGESTION)  MG/5ML syrup Take 10 mLs by mouth every 4 hours as needed for Cough      promethazine (PHENERGAN) 25 MG/ML injection Inject 6.25 mg into the muscle every 6 hours as needed (nausea/vomiting)      mineral oil-hydrophilic petrolatum (AQUAPHOR) ointment Apply to dry skin area topically as needed daily.  polyvinyl alcohol (ARTIFICIAL TEARS) 1.4 % ophthalmic solution Place 1 drop into both eyes as needed for Dry Eyes      bisacodyl (BISAC-EVAC) 10 MG suppository Place 10 mg rectally daily as needed for Constipation      MUCINEX 600 MG extended release tablet Take 1 tablet by mouth 2 times daily 60 tablet 11    ipratropium-albuterol (DUONEB) 0.5-2.5 (3) MG/3ML SOLN nebulizer solution INHALE 1 UNIT DOSE VIA NEBULIZER 4 TIMES DAILY AS NEEDED.  *VIALS MUST STAY
4

## 2020-04-03 PROBLEM — R06.2 WHEEZING: Status: ACTIVE | Noted: 2020-01-01

## 2020-04-30 NOTE — PROGRESS NOTES
4/30/2020     Home visit medically necessary in lieu of an office visit due to: [facility] resident, bedbound, dementia, difficult to get out. HPI:  [facility] staff reports he is doing okay. Patient says he is not having any problems. He has been breathing well on continuous O2 and not been coughing. He continues getting neb txs for wheezing. No SOB, CP or URI symptoms. Appetite good. Moving bowels well. No nausea or abdominal pain. He is using his walker at all times. No recent falls. No behaviors. REVIEW OF SYSTEMS:   GENERAL: Appetite good. Gradual  weight gain, generally healthy, no change in strength or exercise tolerance. CAN'T SEE MUCH OUT OF L EYE. CARDIOVASCULAR: No edema, no chest pains, no murmurs, no palpitations, no syncope, no orthopnea. RESPIRATORY: no pain with breathing, no wheezing, no hemoptysis, no cough. SOB W/O O2. GASTROINTESTINAL: No change in appetite, no dysphagia, no heartburn, no abdominal pains, no constipation or diarrhea, no bowel habit changes, no emesis, no melena, no hemorrhoids. GENITOURINARY: No incontinence or retention, no urinary urgency, no nocturia, no frequent UTIs, no dysuria, no change in nature of urine. MUSCULOSKELETAL: No pain in muscles or joints, no swelling or redness of joints, no limitation of range of motion, no weakness or numbness. All other systems negative. PAST MEDICAL HISTORY:  (Major events, hospitalizations, surgeries): Unknown lumbar surgery. [facility] October 2013. Known allergies: NKA. Ongoing medical problems: Dementia with behaviors, Depression, HTN, Psychosis, BPH, constipation. Family medical history: Non-contributory. Preventative: Flu vac 10/2019, Pneumovax 23- 2019, Prevnar 13 - 2019. Feb. Social history: . Was  to MARYMET for 30 years. No children. Served in [employer], [employer] and [employer]. Non-smoker, no alcohol. Reg diet. DNR-CCA.     PHYSICAL EXAM:    Vitals:    04/30/20 0906   BP: 125/61   Site: Left Wrist   Position: Sitting   Pulse: 91   Resp: 22   Temp: 97.8 °F (36.6 °C)   SpO2: 99%  Comment: 3L NC O2   Weight: 162 lb 9.6 oz (73.8 kg)   Height: 5' 7\" (1.702 m)        GEN: elderly male patient sitting in recliner in NAD with easy respirations. HEAD:  atraumatic, normocephalic. EYES:  EOMI, PERRL, L with mod-severe cataract, unable to see with L eye, bilateral conjunctiva normal.   ENT:   Fair hearing, EACs with mild wax, TMs nml, nasal septum midline, mild congestion, oral cavity without lesions, edentulous full dentures. NECK:  fair-good ROM, no palpable masses, no carotid bruits, no JVD. LUNGS:  No rales, rhonchi or wheezes. HEART:  RRR, no murmurs or gallops. ABD:  soft, non-tender to palp, no palp masses or HSM, normal BS. BACK: Lumbar scar, no scoliosis or kyphosis, non-tender to palp. EXTREMITIES:  No edema, no ulcerations, varicosities or erythema. No gross deformities. MUSCULOSKELETAL:  limited ROM of all joints, non tender to palp and or with movement. Very decreased active ROM L arm, has good passive ROM. SKIN:  No ulcerations or breakdown, rash, ecchymosis, or other lesions. NEURO:  Able to stand with help, walks slowly with walker. No tremor, motor UEs 5/5 LEs  5/5, sensory normal, mild ataxia. PSYCH:  Pleasant and cooperative. Fluid speech, interactive and oriented to person only. No delusional statements. 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     Marly Fletcher was seen today for dementia and copd. 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 Continue O2 (2-4 LPM per NC continuous every shift) to keep sats >90%. Monitor BMP and BNP q 3 months next in July. May consider prophylactic atb if UTIs become very frequent. Fall prevention discussed. Continue other meds as per Rx List.   Recheck 1 month. 40 minutes spent on visit, 25 minutes regarding pulmonary, dementia, cardiac, HTN and depression disease processes, treatment options, meds and coordination of care with [facility]. Current Outpatient Medications   Medication Sig Dispense Refill    rivastigmine (EXELON) 4.6 MG/24HR       dextromethorphan-guaiFENesin (QC TUSSIN DM COUGH/CONGESTION)  MG/5ML syrup Take 10 mLs by mouth every 4 hours as needed for Cough      promethazine (PHENERGAN) 25 MG/ML injection Inject 6.25 mg into the muscle every 6 hours as needed (nausea/vomiting)      mineral oil-hydrophilic petrolatum (AQUAPHOR) ointment Apply to dry skin area topically as needed daily.       polyvinyl alcohol (ARTIFICIAL TEARS) 1.4 % ophthalmic solution Place 1 drop into both eyes as needed for Dry Eyes      bisacodyl (BISAC-EVAC) 10 MG suppository Place 10 mg rectally daily as needed for Constipation      MUCINEX 600 MG extended release tablet Take 1 tablet by mouth 2 times daily 60 tablet 11   

## 2020-05-29 NOTE — PROGRESS NOTES
5/29/2020     Home visit medically necessary in lieu of an office visit due to: [facility] resident, bedbound, dementia, difficult to get out. HPI:  Says he is doing well. He has been breathing well on continuous O2 and not been coughing. He continues getting neb txs TID  for wheezing. No SOB, CP or URI symptoms. Appetite good. Moving bowels well. No nausea or abdominal pain. He is using his walker at all times. No recent falls. No behaviors. REVIEW OF SYSTEMS:   GENERAL: Appetite good. Gradual  weight gain, generally healthy, no change in strength or exercise tolerance. CAN'T SEE MUCH OUT OF L EYE. CARDIOVASCULAR: No edema, no chest pains, no murmurs, no palpitations, no syncope, no orthopnea. RESPIRATORY: no pain with breathing, no wheezing, no hemoptysis, no cough. SOB W/O O2. GASTROINTESTINAL: No change in appetite, no dysphagia, no heartburn, no abdominal pains, no constipation or diarrhea, no bowel habit changes, no emesis, no melena, no hemorrhoids. GENITOURINARY: No incontinence or retention, no urinary urgency, no nocturia, no frequent UTIs, no dysuria, no change in nature of urine. MUSCULOSKELETAL: No pain in muscles or joints, no swelling or redness of joints, no limitation of range of motion, no weakness or numbness. All other systems negative. PAST MEDICAL HISTORY:  (Major events, hospitalizations, surgeries): Unknown lumbar surgery. [facility] October 2013. Known allergies: NKA. Ongoing medical problems: Dementia with behaviors, Depression, HTN, Psychosis, BPH, constipation. Family medical history: Non-contributory. Preventative: Flu vac 10/2019, Pneumovax 23- 2019, Prevnar 13 - 2019. Feb. Social history: . Was  to CAROLEE for 30 years. No children. Served in [employer], [employer] and [employer]. Non-smoker, no alcohol. Reg diet. DNR-CCA. PHYSICAL EXAM:    Wt decreased 1.2 lbs.     Vitals:    05/29/20 0940   BP: 113/69   Pulse: 81   Resp: 18   Temp: 96.5 °F (35.8 °C)   TempSrc: Tympanic   SpO2: 98%  Comment: 3 lpm   Weight: 161 lb 6.4 oz (73.2 kg)   Height: 5' 7\" (1.702 m)        GEN: elderly male patient sitting in recliner in NAD with easy respirations. HEAD:  atraumatic, normocephalic. EYES:  EOMI, PERRL, L with mod-severe cataract, unable to see with L eye, bilateral conjunctiva normal.   ENT:   Fair hearing, EACs with mild wax, TMs nml, nasal septum midline, mild congestion, oral cavity without lesions, edentulous full dentures. NECK:  fair-good ROM, no palpable masses, no carotid bruits, no JVD. LUNGS:  No rales, rhonchi or wheezes. HEART:  RRR, no murmurs or gallops. ABD:  soft, non-tender to palp, no palp masses or HSM, normal BS. BACK: Lumbar scar, no scoliosis or kyphosis, non-tender to palp. EXTREMITIES:  No edema, no ulcerations, varicosities or erythema. No gross deformities. MUSCULOSKELETAL:  limited ROM of all joints, non tender to palp and or with movement. Very decreased active ROM L arm, has good passive ROM. SKIN:  No ulcerations or breakdown, rash, ecchymosis, or other lesions. NEURO:  Able to stand with help, walks slowly with walker. No tremor, motor UEs 5/5 LEs  5/5, sensory normal, mild ataxia. PSYCH:  Pleasant and cooperative. Fluid speech, interactive and oriented to person only. No delusional statements. 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     Gunner Krishnamurthy was seen today for copd, dementia and hypertension. 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 Continue O2 (2-4 LPM per NC continuous every shift) to keep sats >90%. Monitor BMP and BNP q 3 months next in July. May consider prophylactic atb if UTIs become very frequent. Fall prevention discussed. Continue other meds as per Rx List.   Recheck 1 month. 40 minutes spent on visit, 25 minutes regarding pulmonary, dementia, cardiac, HTN and depression disease processes, treatment options, meds and coordination of care with [facility]. Current Outpatient Medications   Medication Sig Dispense Refill    bethanechol (URECHOLINE) 10 MG tablet TAKE 1 TABLET BY MOUTH 3 TIMES DAILY 90 tablet 10    rivastigmine (EXELON) 4.6 MG/24HR       dextromethorphan-guaiFENesin (QC TUSSIN DM COUGH/CONGESTION)  MG/5ML syrup Take 10 mLs by mouth every 4 hours as needed for Cough      promethazine (PHENERGAN) 25 MG/ML injection Inject 6.25 mg into the muscle every 6 hours as needed (nausea/vomiting)      mineral oil-hydrophilic petrolatum (AQUAPHOR) ointment Apply to dry skin area topically as needed daily.       polyvinyl alcohol (ARTIFICIAL TEARS) 1.4 % ophthalmic solution Place 1 drop into both eyes as needed for Dry Eyes      bisacodyl (BISAC-EVAC) 10 MG suppository Place 10 mg rectally daily as needed for Constipation      MUCINEX 600 MG extended release tablet Take 1 tablet

## 2020-07-01 NOTE — PROGRESS NOTES
7/1/2020     Home visit medically necessary in lieu of an office visit due to: [facility] resident, bedbound, dementia, difficult to get out. HPI:  [facility] nursing reports he is stable. He has been breathing well on continuous O2 and not been coughing. Has COPD. He continues getting neb txs TID for wheezing. No SOB, CP or URI symptoms. Appetite good. Moving bowels well. No urinary symptoms lately. No nausea or abdominal pain. He is using his walker at all times. No recent falls. No behaviors. REVIEW OF SYSTEMS:   GENERAL: Appetite good. Gradual  weight gain, generally healthy, no change in strength or exercise tolerance. CAN'T SEE MUCH OUT OF L EYE. CARDIOVASCULAR: No edema, no chest pains, no murmurs, no palpitations, no syncope, no orthopnea. RESPIRATORY: no pain with breathing, no wheezing, no hemoptysis, no cough. SOB W/O O2. GASTROINTESTINAL: No change in appetite, no dysphagia, no heartburn, no abdominal pains, no constipation or diarrhea, no bowel habit changes, no emesis, no melena, no hemorrhoids. GENITOURINARY: No incontinence or retention, no urinary urgency, no nocturia, no frequent UTIs, no dysuria, no change in nature of urine. MUSCULOSKELETAL: No pain in muscles or joints, no swelling or redness of joints, no limitation of range of motion, no weakness or numbness. All other systems negative. PAST MEDICAL HISTORY:  (Major events, hospitalizations, surgeries): Unknown lumbar surgery. [facility] October 2013. Known allergies: NKA. Ongoing medical problems: Dementia with behaviors, Depression, HTN, Psychosis, BPH, constipation. Family medical history: Non-contributory. Preventative: Flu vac 10/2019, Pneumovax 23- 2019, Prevnar 13 - 2019. Feb. Social history: . Was  to GRIMMET for 30 years. No children. Served in [employer] [address] [place] [address] [employer]. Non-smoker, no alcohol. Reg diet. DNR-CCA.     PHYSICAL EXAM:      Vitals:    07/01/20 0904   BP: (!) 105/58   Site: Right Wrist   Position: Sitting   Pulse: 85   Resp: 20   Temp: 98 °F (36.7 °C)   SpO2: 98%  Comment: 3L NC   Weight: 157 lb 12.8 oz (71.6 kg)   Height: 5' 7\" (1.702 m)      GEN: elderly male patient sitting in recliner in NAD with easy respirations. HEAD:  atraumatic, normocephalic. EYES:  EOMI, PERRL, L with mod-severe cataract, unable to see with L eye, bilateral conjunctiva normal.   ENT:   Fair hearing, EACs with mild wax, TMs nml, nasal septum midline, mild congestion, oral cavity without lesions, edentulous full dentures. NECK:  fair-good ROM, no palpable masses, no carotid bruits, no JVD. LUNGS:  No rales, rhonchi. Faint end exp. wheeze. HEART:  RRR, no murmurs or gallops. ABD:  soft, non-tender to palp, no palp masses or HSM, normal BS. BACK: Lumbar scar, no scoliosis or kyphosis, non-tender to palp. EXTREMITIES:  No edema, no ulcerations, varicosities or erythema. No gross deformities. MUSCULOSKELETAL:  limited ROM of all joints, non tender to palp and or with movement. Very decreased active ROM L arm, has good passive ROM. SKIN:  No ulcerations or breakdown, rash, ecchymosis, or other lesions. NEURO:  Able to stand with help, walks slowly with walker. No tremor, motor UEs 5/5 LEs  5/5, sensory normal, mild ataxia. PSYCH:  Pleasant and cooperative. Fluid speech, interactive and oriented to person only. No delusional statements. 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 Return in about 1 month (around 8/1/2020). An electronic signature was used to authenticate this note.     --Bebeto Arzola PA-C on 7/1/2020 at 11:04 AM

## 2020-07-03 NOTE — PROGRESS NOTES
[id number] [facility] patient     Chief Complaint   Patient presents with    Toe Pain       Subjective:  Dave Corral is seen in nursing home  per nursing for foot and nail care. Pt currently has complaint of thickened, painful, elongated nails that he/she cannot manage by themselves. Pt. Relates pain to nails with shoe gear. Pt's primary care physician is Rolo Busch MD.5/29/2020 patient not seen since February due to [facility]  Past Medical History:   Diagnosis Date    Chronic kidney disease     COPD (chronic obstructive pulmonary disease) ([facility]     Dementia ([facility]     Depression     Hypertension     Neuromuscular disorder ([facility]     Other disorders of kidney and ureter in diseases classified elsewhere     Pneumonia     Psychosis ([facility]        No Known Allergies  Current Outpatient Medications on File Prior to Visit   Medication Sig Dispense Refill    ipratropium-albuterol (DUONEB) 0.5-2.5 (3) MG/3ML SOLN nebulizer solution INHALE CONTENTS OF 1 VIAL VIA NEBULIZER THREE TIMES DAILY. (Patient taking differently: Take 1 vial by nebulization 3 times daily ) 3 mL 10    bethanechol (URECHOLINE) 10 MG tablet TAKE 1 TABLET BY MOUTH 3 TIMES DAILY 90 tablet 10    rivastigmine (EXELON) 4.6 MG/24HR Place 1 patch onto the skin daily       dextromethorphan-guaiFENesin (QC TUSSIN DM COUGH/CONGESTION)  MG/5ML syrup Take 10 mLs by mouth every 4 hours as needed for Cough      promethazine (PHENERGAN) 25 MG/ML injection Inject 25 mg into the muscle every 6 hours as needed (nausea/vomiting)       mineral oil-hydrophilic petrolatum (AQUAPHOR) ointment Apply 1 Container topically as needed for Dry Skin Apply to dry skin area topically as needed daily.        polyvinyl alcohol (ARTIFICIAL TEARS) 1.4 % ophthalmic solution Place 1 drop into both eyes as needed for Dry Eyes      bisacodyl (BISAC-EVAC) 10 MG suppository Place 10 mg rectally daily as needed for Constipation      MUCINEX 600 MG extended release tablet Take 1 tablet by mouth 2 times daily 60 tablet 11    OLANZapine (ZYPREXA) 5 MG tablet TAKE 1 TABLET BY MOUTH DAILY AT BEDTIME (Patient taking differently: Take 5 mg by mouth nightly ) 30 tablet 11    mirtazapine (REMERON) 15 MG tablet TAKE ONE TABLET BY MOUTH ONCE DAILY AT BEDTIME. (Patient taking differently: Take 15 mg by mouth nightly ) 31 tablet 10    tamsulosin (FLOMAX) 0.4 MG capsule TAKE 1 CAPSULE BY MOUTH DAILY *DO NOT CRUSH, CHEW, OR OPEN CAPSULE* (Patient taking differently: Take 0.4 mg by mouth daily ) 30 capsule 10    sertraline (ZOLOFT) 50 MG tablet TAKE ONE(1) TABLET BY MOUTH DAILY **STO (Patient taking differently: Take 50 mg by mouth daily ) 31 tablet 10    ASPIRIN LOW DOSE 81 MG EC tablet Take 1 tablet by mouth daily  11    ondansetron (ZOFRAN) 4 MG tablet TAKE 1 TABLET BY MOUTH THREE TIMES DAILY AS NEEDED FOR N/V. (Patient taking differently: Take 4 mg by mouth every 8 hours as needed for Nausea or Vomiting ) 30 tablet 11    furosemide (LASIX) 20 MG tablet Take 20 mg by mouth daily      OXYGEN 2L NC; Titrate to SpO2 92-95%. 1 Device 0    magnesium hydroxide (MILK OF MAGNESIA CONCENTRATE) 2400 MG/10ML SUSP Take 30 mLs by mouth daily as needed       acetaminophen 650 MG TABS Take 650 mg by mouth every 6 hours as needed for Pain. (Patient taking differently: Take 650 mg by mouth every 4 hours as needed for Pain or Fever ) 120 tablet      No current facility-administered medications on file prior to visit. Review of Systems   Constitutional: Negative. HENT: Negative. Eyes: Negative. Respiratory: Negative. Cardiovascular: Positive for leg swelling. Endocrine: Negative. Genitourinary: Negative. Objective:  General: AAO x 3 in NAD.     Derm  Toenail Description  Sites of Onychomycosis Involvement (Check affected area)  [x] [x] [x] [x] [x] [x] [x] [x] [x] [x]  5 4 3 2 1 1 2 3 4 5                          Right

## 2020-07-28 PROBLEM — I73.9 PERIPHERAL VASCULAR DISEASE, UNSPECIFIED (HCC): Status: RESOLVED | Noted: 2019-07-05 | Resolved: 2020-01-01

## 2020-07-28 NOTE — PROGRESS NOTES
7/29/2020     Home visit medically necessary in lieu of an office visit due to: [facility] resident, bedbound, dementia, difficult to get out. HPI:  [facility] nursing reports he has been doing well. He continues breathing well on continuous O2. He has had no coughing spells. He continues getting neb txs TID for wheezing. He get SOB with exertion. He has had no SOB at rest, CP or URI symptoms. Appetite has been good. He is moving bowels well. No urinary symptoms lately. He is using his walker at all times. No falls this month. No behaviors. REVIEW OF SYSTEMS:   GENERAL: Appetite good. Gradual  weight gain, generally healthy, no change in strength or exercise tolerance. CAN'T SEE MUCH OUT OF L EYE. CARDIOVASCULAR: No edema, no chest pains, no murmurs, no palpitations, no syncope, no orthopnea. RESPIRATORY: no pain with breathing, no wheezing, no hemoptysis, no cough. SOB W/O O2. GASTROINTESTINAL: No change in appetite, no dysphagia, no heartburn, no abdominal pains, no constipation or diarrhea, no bowel habit changes, no emesis, no melena, no hemorrhoids. GENITOURINARY: No incontinence or retention, no urinary urgency, no nocturia, no frequent UTIs, no dysuria, no change in nature of urine. MUSCULOSKELETAL: No pain in muscles or joints, no swelling or redness of joints, no limitation of range of motion, no weakness or numbness. All other systems negative. PAST MEDICAL HISTORY:  (Major events, hospitalizations, surgeries): Unknown lumbar surgery. [facility] October 2013. Known allergies: NKA. Ongoing medical problems: Dementia with behaviors, Depression, HTN, Psychosis, BPH, constipation. Family medical history: Non-contributory. Preventative: Flu vac 10/2019, Pneumovax 23- 2019, Prevnar 13 - 2019. Feb. Social history: . Was  to TOMÁSIMMET for 30 years. No children. Served in [employer] [address] [place] [address] [employer]. Non-smoker, no alcohol. Reg diet. DNR-CCA.     PHYSICAL EXAM:      Vitals: 07/29/20 0856   BP: 122/66   Pulse: 91   Resp: 24   Temp: 96.9 °F (36.1 °C)   SpO2: 91%  Comment: on 3 LPM continuous oxygen   Weight: 157 lb 12.8 oz (71.6 kg)   Height: 5' 7\" (1.702 m)      GEN: elderly male patient sitting in recliner in NAD with easy respirations. HEAD:  atraumatic, normocephalic. EYES:  EOMI, PERRL, L with mod-severe cataract, unable to see with L eye, bilateral conjunctiva normal.   ENT:   Fair hearing, EACs with mild wax, TMs nml, nasal septum midline, mild congestion, oral cavity without lesions, edentulous full dentures. NECK:  fair-good ROM, no palpable masses, no carotid bruits, no JVD. LUNGS:  No rales, rhonchi. Faint end exp. wheeze. HEART:  RRR, no murmurs or gallops. ABD:  soft, non-tender to palp, no palp masses or HSM, normal BS. BACK: Lumbar scar, no scoliosis or kyphosis, non-tender to palp. EXTREMITIES:  No edema, no ulcerations, varicosities or erythema. No gross deformities. MUSCULOSKELETAL:  limited ROM of all joints, non tender to palp and or with movement. Very decreased active ROM L arm, has good passive ROM. SKIN:  No ulcerations or breakdown, rash, ecchymosis, or other lesions. NEURO:  Able to stand with help, walks slowly with walker. No tremor, motor UEs 5/5 LEs  5/5, sensory normal, mild ataxia. PSYCH:  Pleasant and cooperative. Fluid speech, interactive and oriented to person only. No delusional statements. 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

## 2020-08-26 NOTE — PROGRESS NOTES
8/26/2020     Home visit medically necessary in lieu of an office visit due to: [facility] resident, bedbound, dementia, difficult to get out. HPI:  Memory unit staff state he continues breathing well on continuous O2. He has had no coughing spells. He continues getting neb txs TID for wheezing. He get SOB with exertion. He has had no SOB at rest, CP or URI symptoms. Appetite has been good. He is moving bowels well. No urinary symptoms lately. He is using his walker at all times. No falls this month. No behaviors. Sleeps throughout the nighi. REVIEW OF SYSTEMS:   GENERAL: Appetite good. Gradual  weight gain, generally healthy, no change in strength or exercise tolerance. CAN'T SEE MUCH OUT OF L EYE. CARDIOVASCULAR: No edema, no chest pains, no murmurs, no palpitations, no syncope, no orthopnea. RESPIRATORY: no pain with breathing, no wheezing, no hemoptysis, no cough. SOB W/O O2. GASTROINTESTINAL: No change in appetite, no dysphagia, no heartburn, no abdominal pains, no constipation or diarrhea, no bowel habit changes, no emesis, no melena, no hemorrhoids. GENITOURINARY: No incontinence or retention, no urinary urgency, no nocturia, no frequent UTIs, no dysuria, no change in nature of urine. MUSCULOSKELETAL: No pain in muscles or joints, no swelling or redness of joints, no limitation of range of motion, no weakness or numbness. All other systems negative. PAST MEDICAL HISTORY:  (Major events, hospitalizations, surgeries): Unknown lumbar surgery. [facility] October 2013. Known allergies: NKA. Ongoing medical problems: Dementia with behaviors, Depression, HTN, Psychosis, BPH, constipation. Family medical history: Non-contributory. Preventative: Flu vac 10/2019, Pneumovax 23- 2019, Prevnar 13 - 2019. Feb. Social history: . Was  to TOMÁSIMMET for 30 years. No children. Served in [employer] [address] [place] [address] [employer] and [employer]. Non-smoker, no alcohol. Reg diet. DNR-CCA.     PHYSICAL EXAM: (luts); Other constipation; Unspecified chronic bronchitis ([address] History of recurrent pneumonia; Tinea unguium; Pain in left toe(s); Pain in toe of right foot; Difficulty walking; Moderate chronic obstructive pulmonary disease ([address] Chronic kidney disease; Wheezing; and Dementia ([address] on their problem list.     Luna Aleman was seen today for dementia. Diagnoses and all orders for this visit:    Moderate chronic obstructive pulmonary disease (HCC)    Dementia with behavioral disturbance, unspecified dementia type ([address]    Essential hypertension    Stage 3 chronic kidney disease (HCC)    Difficulty walking         PLAN:  Cont nebulizer txs TID for wheezing and COPD. Continue O2 (2-4 LPM per NC continuous every shift) to keep sats >90%. Monitor BMP and BNP q 3 months next in October. Continue other meds as per Rx List.   Recheck 1 month. 40 minutes spent on visit, 25 minutes regarding pulmonary, dementia, cardiac, HTN and depression disease processes, treatment options, meds and coordination of care with [facility]. Current Outpatient Medications   Medication Sig Dispense Refill    cyanocobalamin 1000 MCG/ML injection       tamsulosin (FLOMAX) 0.4 MG capsule TAKE 1 CAPSULE BY MOUTH DAILY *DO NOT CRUSH, CHEW, OR OPEN CAPSULE* 30 capsule 11    sertraline (ZOLOFT) 50 MG tablet TAKE ONE(1) TABLET BY MOUTH DAILY 31 tablet 11    mirtazapine (REMERON) 15 MG tablet TAKE ONE TABLET BY MOUTH ONCE DAILY AT BEDTIME. 31 tablet 11    ipratropium-albuterol (DUONEB) 0.5-2.5 (3) MG/3ML SOLN nebulizer solution INHALE CONTENTS OF 1 VIAL VIA NEBULIZER THREE TIMES DAILY.  (Patient taking differently: Take 1 vial by nebulization 3 times daily ) 3 mL 10    bethanechol (URECHOLINE) 10 MG tablet TAKE 1 TABLET BY MOUTH 3 TIMES DAILY 90 tablet 10    rivastigmine (EXELON) 4.6 MG/24HR Place 1 patch onto the skin daily       dextromethorphan-guaiFENesin (QC TUSSIN DM COUGH/CONGESTION)  MG/5ML syrup Take 10 mLs by mouth every 4 hours as needed for Cough      promethazine (PHENERGAN) 25 MG/ML injection Inject 25 mg into the muscle every 6 hours as needed (nausea/vomiting)       mineral oil-hydrophilic petrolatum (AQUAPHOR) ointment Apply 1 Container topically as needed for Dry Skin Apply to dry skin area topically as needed daily.  polyvinyl alcohol (ARTIFICIAL TEARS) 1.4 % ophthalmic solution Place 1 drop into both eyes as needed for Dry Eyes      bisacodyl (BISAC-EVAC) 10 MG suppository Place 10 mg rectally daily as needed for Constipation      MUCINEX 600 MG extended release tablet Take 1 tablet by mouth 2 times daily 60 tablet 11    OLANZapine (ZYPREXA) 5 MG tablet TAKE 1 TABLET BY MOUTH DAILY AT BEDTIME (Patient taking differently: Take 5 mg by mouth nightly ) 30 tablet 11    ASPIRIN LOW DOSE 81 MG EC tablet Take 1 tablet by mouth daily  11    ondansetron (ZOFRAN) 4 MG tablet TAKE 1 TABLET BY MOUTH THREE TIMES DAILY AS NEEDED FOR N/V. (Patient taking differently: Take 4 mg by mouth every 8 hours as needed for Nausea or Vomiting ) 30 tablet 11    furosemide (LASIX) 20 MG tablet Take 20 mg by mouth daily      OXYGEN 2L NC; Titrate to SpO2 92-95%. 1 Device 0    magnesium hydroxide (MILK OF MAGNESIA CONCENTRATE) 2400 MG/10ML SUSP Take 30 mLs by mouth daily as needed       acetaminophen 650 MG TABS Take 650 mg by mouth every 6 hours as needed for Pain. (Patient taking differently: Take 650 mg by mouth every 4 hours as needed for Pain or Fever ) 120 tablet      No current facility-administered medications for this visit. Return in about 1 month (around 9/26/2020). An electronic signature was used to authenticate this note.     --Francie Martinez PA-C on 8/26/2020 at 9:22 AM

## 2020-09-02 NOTE — TELEPHONE ENCOUNTER
Fax from [facility]. Savanah Gomez has a pimple like area on the corner of his outer left eye. Pea-sized. No s/s of distress, area red and hard to touch. Orders?

## 2020-09-04 NOTE — PROGRESS NOTES
[id number] [facility] [place] patient     Chief Complaint   Patient presents with    Toe Pain       Subjective:  David Mathis is seen in nursing home  per nursing for foot and nail care. Pt currently has complaint of thickened, painful, elongated nails that he/she cannot manage by themselves. Pt. Relates pain to nails with shoe gear. Pt's primary care physician is Melody Wilks MD.8/26/2020  No new issues   Past Medical History:   Diagnosis Date    Chronic kidney disease     [facility]     Dementia [facility]     Depression     Hypertension     Neuromuscular disorder [facility]     Other disorders of kidney and ureter in diseases classified elsewhere     Pneumonia     Psychosis ([facility]        No Known Allergies  Current Outpatient Medications on File Prior to Visit   Medication Sig Dispense Refill    cyanocobalamin 1000 MCG/ML injection       tamsulosin (FLOMAX) 0.4 MG capsule TAKE 1 CAPSULE BY MOUTH DAILY *DO NOT CRUSH, CHEW, OR OPEN CAPSULE* 30 capsule 11    sertraline (ZOLOFT) 50 MG tablet TAKE ONE(1) TABLET BY MOUTH DAILY 31 tablet 11    mirtazapine (REMERON) 15 MG tablet TAKE ONE TABLET BY MOUTH ONCE DAILY AT BEDTIME. 31 tablet 11    ipratropium-albuterol (DUONEB) 0.5-2.5 (3) MG/3ML SOLN nebulizer solution INHALE CONTENTS OF 1 VIAL VIA NEBULIZER THREE TIMES DAILY.  (Patient taking differently: Take 1 vial by nebulization 3 times daily ) 3 mL 10    bethanechol (URECHOLINE) 10 MG tablet TAKE 1 TABLET BY MOUTH 3 TIMES DAILY 90 tablet 10    rivastigmine (EXELON) 4.6 MG/24HR Place 1 patch onto the skin daily       dextromethorphan-guaiFENesin (QC TUSSIN DM COUGH/CONGESTION)  MG/5ML syrup Take 10 mLs by mouth every 4 hours as needed for Cough      promethazine (PHENERGAN) 25 MG/ML injection Inject 25 mg into the muscle every 6 hours as needed (nausea/vomiting)       mineral oil-hydrophilic petrolatum (AQUAPHOR) ointment Apply 1 Container topically as needed for Dry Skin Apply to dry skin area topically as needed daily.  polyvinyl alcohol (ARTIFICIAL TEARS) 1.4 % ophthalmic solution Place 1 drop into both eyes as needed for Dry Eyes      bisacodyl (BISAC-EVAC) 10 MG suppository Place 10 mg rectally daily as needed for Constipation      MUCINEX 600 MG extended release tablet Take 1 tablet by mouth 2 times daily 60 tablet 11    OLANZapine (ZYPREXA) 5 MG tablet TAKE 1 TABLET BY MOUTH DAILY AT BEDTIME (Patient taking differently: Take 5 mg by mouth nightly ) 30 tablet 11    ASPIRIN LOW DOSE 81 MG EC tablet Take 1 tablet by mouth daily  11    ondansetron (ZOFRAN) 4 MG tablet TAKE 1 TABLET BY MOUTH THREE TIMES DAILY AS NEEDED FOR N/V. (Patient taking differently: Take 4 mg by mouth every 8 hours as needed for Nausea or Vomiting ) 30 tablet 11    furosemide (LASIX) 20 MG tablet Take 20 mg by mouth daily      OXYGEN 2L NC; Titrate to SpO2 92-95%. 1 Device 0    magnesium hydroxide (MILK OF MAGNESIA CONCENTRATE) 2400 MG/10ML SUSP Take 30 mLs by mouth daily as needed       acetaminophen 650 MG TABS Take 650 mg by mouth every 6 hours as needed for Pain. (Patient taking differently: Take 650 mg by mouth every 4 hours as needed for Pain or Fever ) 120 tablet      No current facility-administered medications on file prior to visit. Review of Systems   Constitutional: Negative. HENT: Negative. Cardiovascular: Positive for leg swelling. Negative for palpitations. Endocrine: Negative. Genitourinary: Negative. Musculoskeletal: Positive for back pain, gait problem and joint swelling. Objective:  General: AAO x 3 in NAD.     Derm  Toenail Description  Sites of Onychomycosis Involvement (Check affected area)  [x] [x] [x] [x] [x] [x] [x] [x] [x] [x]  5 4 3 2 1 1 2 3 4 5                          Right                                        Left    Thickness  [x] [x] [x] [x] [x] [x] [x] [x] [x] [x]  5 4 3 2 1 1 2 3 4 5                         Right Left    Dystrophic Changes   [x] [x] [x] [x] [x] [x] [x] [x] [x] [x]  5 4 3 2 1 1 2 3 4 5                         Right                                        Left    Color  [x] [x] [x] [x] [x] [x] [x] [x] [x] [x]  5 4 3 2 1 1 2 3 4 5                          Right                                        Left    Incurvation/Ingrowin   [] [] [] [] [x] [x] [] [] [] []  5 4 3 2 1 1 2 3 4 5                         Right                                        Left    Inflammation/Pain   [x] [x] [x] [x] [x] [x] [x] [x] [x] [x]  5 4 3  2 1 1 2 3 4 5                         Right                                        Left      Nails that are described above are all elongated thickened pitting mycotic yellowish incurvated causing pain with both shoe gear. Palpation      Dermatologic Exam:  Skin lesion/ulceration   Skin   Callus   Musculoskeletal:     1st MPJ ROM normal, Bilateral.  Muscle strength 5/5, Bilateral.  Pain present upon palpation of toenails 1-5, Bilateral. decreased medial longitudinal arch, Bilateral.  Ankle ROM normal,Bilateral.    Dorsally contracted digits absent digits 5, Bilateral.     Vascular:   skin lower extremity loss of hair nails are dystrophic thick discoloration noted to feet    Neurological:  Sensation present to light touch to level of digits, Bilateral.    Foot Exam    General  General Appearance: appears stated age and healthy   Orientation: alert and oriented to person, place, and time   Assistance: cane use       Right Foot/Ankle     Inspection and Palpation  Ecchymosis: none  Tenderness: metatarsals   Skin Exam: skin changes and abnormal color;      Neurovascular  Dorsalis pedis: 1+  Posterior tibial: absent  Saphenous nerve sensation: normal  Tibial nerve sensation: normal  Superficial peroneal nerve sensation: normal  Deep peroneal nerve sensation: normal  Sural nerve sensation: normal      Left Foot/Ankle      Inspection and Palpation  Tenderness: metatarsals   Skin Exam: skin changes and abnormal color; Neurovascular  Dorsalis pedis: 1+  Posterior tibial: absent  Saphenous nerve sensation: normal  Tibial nerve sensation: normal  Superficial peroneal nerve sensation: normal  Deep peroneal nerve sensation: normal  Sural nerve sensation: normal             Ortho Exam    Assessment:  80 y.o. male with:   1. Tinea unguium    2. Peripheral vascular disease, unspecified ([address]    3. Pain in toe of right foot    4. Pain in left toe(s)    5. Difficulty walking     No orders of the defined types were placed in this encounter. Plan:   Pt was evaluated and examined. Patient was given personalized discharge instructions. Nails 1-10 were debrided in length and thickness sharply with a nail nipper and  without incident. Pt will follow up in 9 weeks or sooner if any problems arise. Diagnosis was discussed with the pt and all of their questions were answered in detail. Proper foot hygiene and care was discussed with the pt. Patient to check feet daily and contact the office with any questions/problems/concerns. Other comorbidity noted and will be managed by PCP. Pain waiver discussed with patient and confirmed. Debridement of all painful nails was performed with both manual and electrical debridement to prevent infection and/or ulceration. Patient tolerated the debridement well, without any complaints.   Patient was asymptomatic after debridement    9/4/2020      Electronically signed by Trina Hernandez DPM on 9/4/2020 at 11:28 AM  9/4/2020

## 2020-09-25 NOTE — PROGRESS NOTES
9/25/2020     Home visit medically necessary in lieu of an office visit due to: [facility] resident, bedbound, dementia, difficult to get out. HPI:  Warm, moist compresses have been applied to left lateral lower eyelid area to a \"pimple. \" Area appears prominent now. He denies pain. He continues breathing well on continuous O2. He has had no extended coughing spells. He continues getting neb txs TID for wheezing. He gets SOB with exertion. He has had no SOB at rest, CP or URI symptoms. Appetite has been good. He is moving bowels well. No urinary symptoms lately. He is using his walker at all times. No falls this month. No behaviors. Sleeps throughout the night. Seen by Dr. Yumi Montague (pod) for routine foot care. REVIEW OF SYSTEMS:   GENERAL: Appetite good. Gradual  weight gain, generally healthy, no change in strength or exercise tolerance. CAN'T SEE MUCH OUT OF L EYE. CARDIOVASCULAR: No edema, no chest pains, no murmurs, no palpitations, no syncope, no orthopnea. RESPIRATORY: no pain with breathing, no wheezing, no hemoptysis, no cough. SOB W/O O2. GASTROINTESTINAL: No change in appetite, no dysphagia, no heartburn, no abdominal pains, no constipation or diarrhea, no bowel habit changes, no emesis, no melena, no hemorrhoids. GENITOURINARY: No incontinence or retention, no urinary urgency, no nocturia, no frequent UTIs, no dysuria, no change in nature of urine. MUSCULOSKELETAL: No pain in muscles or joints, no swelling or redness of joints, no limitation of range of motion, no weakness or numbness. All other systems negative. PAST MEDICAL HISTORY:  (Major events, hospitalizations, surgeries): Unknown lumbar surgery. [facility] October 2013. Known allergies: NKA. Ongoing medical problems: Dementia with behaviors, Depression, HTN, Psychosis, BPH, constipation. Family medical history: Non-contributory. Preventative: Flu vac 10/2019, Pneumovax 23- 2019, Prevnar 13 - 2019.  Feb. Social history: . Was  to CAROLEE for 30 years. No children. Served in [employer] [address] [place] [address] [employer] and [employer]. Non-smoker, no alcohol. Reg diet. DNR-CCA. PHYSICAL EXAM:      Vitals:    09/25/20 0908   BP: (!) 107/51   Pulse: 81   Resp: 18   Temp: 96.9 °F (36.1 °C)   TempSrc: Temporal   SpO2: 98%  Comment: 2 lpm nc   Weight: 158 lb 9.6 oz (71.9 kg)   Height: 5' 7\" (1.702 m)        GEN: elderly male patient sitting in recliner in NAD with easy respirations. HEAD:  atraumatic, normocephalic. EYES:  EOMI, PERRL, L with mod-severe cataract, unable to see with L eye, bilateral conjunctiva normal.   ENT: ~ 1/2 cm mildly elevated flesh colored rounded area left lower lateral eyelid area. Fair hearing, EACs with mild wax, TMs nml, nasal septum midline, mild congestion, oral cavity without lesions, edentulous full dentures. NECK:  fair-good ROM, no palpable masses, no carotid bruits, no JVD. LUNGS:  No rales, rhonchi. Faint end exp. wheeze. HEART:  RRR, no murmurs or gallops. ABD:  soft, non-tender to palp, no palp masses or HSM, normal BS. BACK: Lumbar scar, no scoliosis or kyphosis, non-tender to palp. EXTREMITIES:  No edema, no ulcerations, varicosities or erythema. No gross deformities. MUSCULOSKELETAL:  limited ROM of all joints, non tender to palp and or with movement. Very decreased active ROM L arm, has good passive ROM. SKIN:  No ulcerations or breakdown, rash, ecchymosis, or other lesions. NEURO:  Able to stand with help, walks slowly with walker. No tremor, motor UEs 5/5 LEs  5/5, sensory normal, mild ataxia. PSYCH:  Pleasant and cooperative. Fluid speech, interactive and oriented to person only. No delusional statements.        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     Beba Alatorre was seen today for dementia. 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 Monitor skin. Cont nebulizer txs TID for wheezing and COPD. Continue O2 (2-4 LPM per NC continuous every shift) to keep sats >90%. Monitor BMP and BNP q 3 months next in January. Continue other meds as per Rx List.   Recheck 1 month. 40 minutes spent on visit, 25 minutes regarding pulmonary, dementia, cardiac, HTN and depression disease processes, treatment options, meds and coordination of care with nursing home.     Current Outpatient Medications   Medication Sig Dispense Refill    cyanocobalamin 1000 MCG/ML injection       tamsulosin (FLOMAX) 0.4 MG capsule TAKE 1 CAPSULE BY MOUTH DAILY *DO NOT CRUSH, CHEW, OR OPEN CAPSULE* 30 capsule 11    sertraline (ZOLOFT) 50 MG tablet TAKE ONE(1) TABLET BY MOUTH DAILY 31 tablet 11    mirtazapine (REMERON) 15 MG tablet TAKE ONE TABLET BY MOUTH ONCE every 4 hours as needed for Pain or Fever ) 120 tablet      No current facility-administered medications for this visit. Return in about 1 month (around 10/25/2020). An electronic signature was used to authenticate this note.     --Kaylee Cardoso PA-C on 9/25/2020 at 9:24 AM

## 2020-10-30 NOTE — PROGRESS NOTES
10/30/2020     Home visit medically necessary in lieu of an office visit due to: [facility] resident, bedbound, dementia, difficult to get out. HPI:  Memory unit staff report he has been stable. He states no complaints. He continues breathing well on continuous O2. He has had no extended coughing spells. He continues getting neb txs TID for wheezing. He gets SOB with exertion. He has had no SOB at rest, CP or URI symptoms. Appetite has been good. He is moving bowels well. No urinary symptoms lately. He is using his walker at all times. No falls this month. No behaviors. Sleeps throughout the night. Wants flu vaccine today. REVIEW OF SYSTEMS:   GENERAL: Appetite good. Gradual  weight gain, generally healthy, no change in strength or exercise tolerance. CAN'T SEE MUCH OUT OF L EYE. CARDIOVASCULAR: No edema, no chest pains, no murmurs, no palpitations, no syncope, no orthopnea. RESPIRATORY: no pain with breathing, no wheezing, no hemoptysis, no cough. SOB W/O O2. GASTROINTESTINAL: No change in appetite, no dysphagia, no heartburn, no abdominal pains, no constipation or diarrhea, no bowel habit changes, no emesis, no melena, no hemorrhoids. GENITOURINARY: No incontinence or retention, no urinary urgency, no nocturia, no frequent UTIs, no dysuria, no change in nature of urine. MUSCULOSKELETAL: No pain in muscles or joints, no swelling or redness of joints, no limitation of range of motion, no weakness or numbness. All other systems negative. PAST MEDICAL HISTORY:  (Major events, hospitalizations, surgeries): Unknown lumbar surgery. [facility] October 2013. Known allergies: NKA. Ongoing medical problems: Dementia with behaviors, Depression, HTN, Psychosis, BPH, constipation. Family medical history: Non-contributory. Preventative: Flu vac 10/2020, Pneumovax 23- 2019, Prevnar 13 - 2019. Feb. Social history: . Was  to CAROLEE for 30 years. No children.    Served in [employer] and [employer]. Non-smoker, no alcohol. Reg diet. DNR-CCA. PHYSICAL EXAM:      Vitals:    10/30/20 1108   BP: 130/70   Pulse: 88   Resp: 18   Temp: 98.1 °F (36.7 °C)   TempSrc: Temporal   SpO2: 93%  Comment: 2 LPM O2 n/c   Weight: 157 lb (71.2 kg)   Height: 5' 7\" (1.702 m)        GEN: elderly male patient sitting in recliner in NAD with easy respirations. HEAD:  atraumatic, normocephalic. EYES:  EOMI, PERRL, L with mod-severe cataract, unable to see with L eye, bilateral conjunctiva normal.   ENT: ~ 1/2 cm mildly elevated flesh colored rounded area left lower lateral eyelid area. Fair hearing, EACs with mild wax, TMs nml, nasal septum midline, mild congestion, oral cavity without lesions, edentulous full dentures. NECK:  fair-good ROM, no palpable masses, no carotid bruits, no JVD. LUNGS:  No rales, rhonchi. Faint end exp. wheeze. HEART:  RRR, no murmurs or gallops. ABD:  soft, non-tender to palp, no palp masses or HSM, normal BS. BACK: Lumbar scar, no scoliosis or kyphosis, non-tender to palp. EXTREMITIES:  No edema, no ulcerations, varicosities or erythema. No gross deformities. MUSCULOSKELETAL:  limited ROM of all joints, non tender to palp and or with movement. Very decreased active ROM L arm, has good passive ROM. SKIN:  No ulcerations or breakdown, rash, ecchymosis, or other lesions. NEURO:  Able to stand with help, walks slowly with walker. No tremor, motor UEs 5/5 LEs  5/5, sensory normal, mild ataxia. PSYCH:  Pleasant and cooperative. Fluid speech, interactive and oriented to person only. No delusional statements.        Meds reviewed  Labs: 10/16/20 HH 12.2/38.3, TSH 4.16,   9/28/20 GFR 59.9, Glu 200, LFT nl, 7/7/20 COVID neg  7/2/20 GFR 55, lytes nl, proBNP 88  4/6/20 GFR 55, lytes nl, BNP 69  1/7/20 GFR 56, lytes nl, BNP 82  10/25/19 UA C&S 96.5E.coli >100,000 8/12/19 UA C&S e-coli  9/17/19 BNP 79, lytes nml, Bun/Creat 26/1.4, GFR 52.5, hepatic wnl, HH 11.9/36.7 TSH 3. 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     Radha Koroma was seen today for dementia and immunizations. Diagnoses and all orders for this visit:    Dementia with behavioral disturbance, unspecified dementia type (HCC)    Moderate chronic obstructive pulmonary disease (HCC)    Essential hypertension    Difficulty walking    Need for influenza vaccination  -     INFLUENZA, QUADV, ADJUVANTED, 65 YRS =, IM, PF, PREFILL SYR, 0.5ML (FLUAD)       PLAN:  FLUAD L deltoid without immediate adverse reaction. Monitor skin. Cont nebulizer txs TID for wheezing and COPD. Continue O2 (2-4 LPM per NC continuous every shift) to keep sats >90%. Monitor BMP and BNP q 3 months next in January. Continue other meds as per Rx List.   Recheck 1 month. 40 minutes spent on visit, 25 minutes regarding pulmonary, dementia, cardiac, HTN and depression disease processes, treatment options, meds and coordination of care with [facility]. Current Outpatient Medications   Medication Sig Dispense Refill    OLANZapine (ZYPREXA) 5 MG tablet TAKE 1 TABLET BY MOUTH DAILY AT BEDTIME 30 tablet 11    cyanocobalamin 1000 MCG/ML injection       tamsulosin (FLOMAX) 0.4 MG capsule TAKE 1 CAPSULE BY MOUTH DAILY *DO NOT CRUSH, CHEW, OR OPEN CAPSULE* 30 capsule 11    sertraline (ZOLOFT) 50 MG tablet TAKE ONE(1) TABLET BY MOUTH DAILY 31 tablet 11    mirtazapine (REMERON) 15 MG tablet TAKE ONE TABLET BY MOUTH ONCE DAILY AT BEDTIME.  31 tablet electronic signature was used to authenticate this note.     --Tenzin Torres PA-C on 10/30/2020 at 3:04 PM

## 2020-11-11 NOTE — TELEPHONE ENCOUNTER
Send order - Cipro 500mg 1 tab BID x 7 days. Rich antifungal cream, apply BID to affected area until resolved, then as needed.

## 2020-11-11 NOTE — TELEPHONE ENCOUNTER
Fax from [facility]. Adriana AMIN has been uploaded. CS still pending. He has a T-99.9 (o). He also has excoriation to bilat buttocks. Can you please order Rich anti-fungal cream to buttocks BID?

## 2020-11-12 NOTE — TELEPHONE ENCOUNTER
Fax from [facility]. Vivian Osman was started on Cipro 500mg BID x 7 days. Would you like to continue this with the new order for Levaquin 500mg qd x 10 days?  Please advise

## 2020-11-19 PROBLEM — A41.9 SEPTIC SHOCK (HCC): Status: ACTIVE | Noted: 2020-01-01

## 2020-11-19 PROBLEM — R65.21 SEPTIC SHOCK (HCC): Status: ACTIVE | Noted: 2020-01-01

## 2020-11-19 NOTE — ED NOTES
Bed: 01  Expected date:   Expected time:   Means of arrival:   Comments:  Harshad [place], [facility]  11/19/20 2053

## 2020-11-19 NOTE — TELEPHONE ENCOUNTER
Mary Ellen from [facility] called. She states she is at [place] [id number] once a week. Today, she is noticing a big decline in Na Kumar since last week. He is currently on Levaquin, but she is noting him to be lethargic with a poor appetite. His urine is foul smelling with blood in it. BP 90/52, O2 sat 90% on 2L O2, HR 63, T 97.9. Orders?

## 2020-11-20 NOTE — CONSULTS
Nephrology Consult  [facility]  Eva Jackson MD    CC:   hypernatremia    HPI:   The pt is an 81 yo male with a pmh of dementia, copd, depression, htn, ckd who presented last night from a nursing home for syncope. He was found to be in septic shock with possible pneumonia and uti. He was started on doxy and vanco. He has been started on ivf and solucortef. Labs show na 159>154, co2 24, bun 47, cr 2.1>1.9, ca 7.5, alb 2.5, hgb 9.5, plt 205, fena <1. Ct shows no hydro. Cr from 1/2020 was 1.3 and 4/2019 was 1.7. he has been hypotensive.      PMH:    Past Medical History:   Diagnosis Date    Chronic kidney disease     COPD (chronic obstructive pulmonary disease) ([address]     Dementia ([address]     Depression     Hypertension     Neuromuscular disorder ([address]     Other disorders of kidney and ureter in diseases classified elsewhere     Pneumonia     Psychosis ([address]        Patient Active Problem List   Diagnosis    Pulmonary fibrosis ([address]    Urinary retention    Dysthymia    Essential hypertension    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 (HCC)    History of falling    Enlarged prostate without lower urinary tract symptoms (luts)    Other constipation    Unspecified chronic bronchitis (HCC)    History of recurrent pneumonia    Tinea unguium    Pain in left toe(s)    Pain in toe of right foot    Difficulty walking    Moderate chronic obstructive pulmonary disease (HCC)    Chronic kidney disease    Wheezing    Dementia (HCC)    Septic shock (HCC)       Meds:     aspirin  81 mg Oral Daily    sodium chloride flush  10 mL Intravenous 2 times per day    heparin (porcine)  5,000 Units Subcutaneous 3 times per day    [START ON 11/21/2020] cefepime  2 g Intravenous Q24H    vancomycin (VANCOCIN) intermittent dosing (placeholder)   Other RX Placeholder    hydrocortisone (SOLU-CORTEF) IVPB  50 mg Intravenous 2 times per day    pantoprazole  40 mg Intravenous Daily    And    sodium chloride (PF)  10 mL Intravenous Daily    doxycycline (VIBRAMYCIN) IV  100 mg Intravenous Q12H        dextrose      [START ON 11/21/2020] sodium chloride      sodium chloride 100 mL/hr at 11/20/20 1113       Meds prn:     sodium chloride flush, acetaminophen **OR** acetaminophen, polyethylene glycol, glucose, dextrose, glucagon (rDNA), dextrose, perflutren lipid microspheres    Meds prior to admission:     No current facility-administered medications on file prior to encounter.       Current Outpatient Medications on File Prior to Encounter   Medication Sig Dispense Refill    acetaminophen (TYLENOL) 325 MG tablet Take 650 mg by mouth every 4 hours as needed for Pain or Fever      miconazole (MICOTIN) 2 % cream Apply 1 each topically 2 times daily      miconazole (MICOTIN) 2 % cream Apply 1 each topically as needed (Preventative Health Maintenance)      furosemide (LASIX) 20 MG tablet Take 20 mg by mouth daily as needed (Leg Swelling)      mirtazapine (REMERON) 15 MG tablet Take 15 mg by mouth nightly      tamsulosin (FLOMAX) 0.4 MG capsule Take 0.4 mg by mouth daily      ondansetron (ZOFRAN) 4 MG tablet Take 4 mg by mouth every 8 hours as needed for Nausea or Vomiting      OLANZapine (ZYPREXA) 5 MG tablet Take 5 mg by mouth nightly      NONFORMULARY Inhale 0.5 mg into the lungs 3 times daily Ipratropium Bromide Solution 0.03%      cefdinir (OMNICEF) 300 MG capsule Take 1 capsule by mouth 2 times daily for 7 days 14 capsule 0    MUCINEX 600 MG extended release tablet Take 1 tablet by mouth 2 times daily *DO NOT CRUSH* 62 tablet 11    cyanocobalamin 1000 MCG/ML injection Inject 1,000 mcg into the muscle every 30 days 18th of the month      bethanechol (URECHOLINE) 10 MG tablet TAKE 1 TABLET BY MOUTH 3 TIMES DAILY 90 tablet 10    rivastigmine (EXELON) 4.6 MG/24HR Place 1 patch onto the skin daily       dextromethorphan-guaiFENesin (QC TUSSIN DM COUGH/CONGESTION)  MG/5ML syrup Take 10 mLs by mouth every 4 hours as needed for Cough      promethazine (PHENERGAN) 25 MG/ML injection Inject 25 mg into the muscle every 6 hours as needed (nausea/vomiting)       mineral oil-hydrophilic petrolatum (AQUAPHOR) ointment Apply 1 Container topically as needed for Dry Skin       polyvinyl alcohol (ARTIFICIAL TEARS) 1.4 % ophthalmic solution Place 1 drop into both eyes as needed for Dry Eyes      bisacodyl (BISAC-EVAC) 10 MG suppository Place 10 mg rectally daily as needed for Constipation      ASPIRIN LOW DOSE 81 MG EC tablet Take 1 tablet by mouth daily  11    OXYGEN 2L NC; Titrate to SpO2 92-95%. (Patient taking differently: Inhale 2-4 L into the lungs continuous Maintain p/o over 90% every shift) 1 Device 0    magnesium hydroxide (MILK OF MAGNESIA CONCENTRATE) 2400 MG/10ML SUSP Take 30 mLs by mouth daily as needed (Constipation)          Allergies:    Patient has no known allergies. Social History:     reports that he has quit smoking. He quit after 25.00 years of use. He has never used smokeless tobacco. He reports that he does not drink alcohol or use drugs.     Family History:         Family history unknown: Yes       ROS:     General: co generalized weakness  Heent: no nasal congestion, sore throat   Resp: no cough, sob , hemoptysis  Cardiac: no cp , le edema, palpitations  Gi: no nausea, vomiting, melena, abd pain, hematemesis  Gu: no hematuria, dysuria   Neruo: no numbness, weakness, headache, blurry vision   Endocrine:  no h/o dm  Derm: no rash , petechia  Heme: no epistaxis, bruising  All other sx negative     Physical Exam:      Patient Vitals for the past 24 hrs:   BP Temp Temp src Pulse Resp SpO2 Weight   11/20/20 1157 (!) 128/94 98.2 °F (36.8 °C) CORE 94 28 100 % --   11/20/20 1003 (!) 101/59 98.4 °F (36.9 °C) CORE 83 20 100 % --   11/20/20 0834 121/60 98.8 °F (37.1 °C) CORE 87 20 97 % --   11/20/20 0717 130/65 -- -- -- -- -- --   11/20/20 0714 (!) 99/49 98.4 °F (36.9 °C) -- 99 24 100 % -- 11/20/20 0111 (!) 100/57 99.7 °F (37.6 °C) Bladder 109 25 98 % --   11/19/20 2320 -- -- -- 112 22 99 % --   11/19/20 2319 -- -- -- 112 19 99 % --   11/19/20 2318 -- -- -- 112 22 99 % --   11/19/20 2317 -- -- -- 112 26 99 % --   11/19/20 2316 107/64 -- -- 112 26 99 % --   11/19/20 2315 -- -- -- 112 25 99 % --   11/19/20 2314 -- -- -- 112 (!) 38 99 % --   11/19/20 2313 -- -- -- 112 28 99 % --   11/19/20 2312 -- -- -- 112 26 99 % --   11/19/20 2311 98/65 -- -- 112 29 99 % --   11/19/20 2310 -- -- -- 113 29 99 % --   11/19/20 2309 -- -- -- 113 29 99 % --   11/19/20 2308 -- -- -- 113 30 99 % --   11/19/20 2307 -- -- -- 113 (!) 32 99 % --   11/19/20 2306 103/64 -- -- 113 29 99 % --   11/19/20 2305 -- -- -- 113 28 99 % --   11/19/20 2304 -- -- -- 113 (!) 34 99 % --   11/19/20 2303 -- -- -- 113 (!) 36 99 % --   11/19/20 2302 -- -- -- 113 (!) 38 99 % --   11/19/20 2301 99/60 -- -- 114 (!) 31 99 % --   11/19/20 2300 -- -- -- 115 23 99 % --   11/19/20 2259 -- -- -- 117 25 99 % --   11/19/20 2258 (!) 111/55 -- -- 117 19 99 % --   11/19/20 2257 -- -- -- 114 26 99 % --   11/19/20 2256 -- -- -- 116 28 99 % --   11/19/20 2255 -- -- -- 114 22 99 % --   11/19/20 2254 -- -- -- 113 25 99 % --   11/19/20 2253 -- -- -- 113 22 99 % --   11/19/20 2252 (!) 97/55 -- -- 117 25 99 % --   11/19/20 2251 -- -- -- 115 25 99 % --   11/19/20 2250 -- -- -- 114 26 99 % --   11/19/20 2249 (!) 83/59 -- -- 118 29 100 % --   11/19/20 2248 -- -- -- 119 29 99 % --   11/19/20 2247 (!) 59/43 -- -- 119 30 98 % --   11/19/20 2246 -- -- -- 119 30 100 % --   11/19/20 2245 (!) 66/48 -- -- 119 (!) 33 99 % --   11/19/20 2244 -- 101.7 °F (38.7 °C) Bladder -- -- -- --   11/19/20 2242 (!) 72/41 -- -- 121 (!) 32 99 % --   11/19/20 2233 (!) 81/51 -- -- 124 30 99 % --   11/19/20 2228 (!) 70/46 -- -- 123 (!) 31 -- --   11/19/20 2224 -- -- -- 125 (!) 31 99 % --   11/19/20 2212 (!) 93/42 -- -- 130 27 100 % --   11/19/20 2151 94/79 -- -- 143 (!) 40 100 % --   11/19/20 2124 -- -- -- 132 (!) 39 97 % --   11/19/20 2120 -- 100.3 °F (37.9 °C) Bladder -- -- -- --   11/19/20 2108 -- -- -- 119 (!) 35 100 % --   11/19/20 2102 (!) 109/55 -- -- 117 30 100 % --   11/19/20 2052 (!) 80/45 -- -- 120 (!) 35 99 % --   11/19/20 2044 (!) 95/28 -- -- 121 (!) 37 97 % --   11/19/20 2043 -- -- -- 122 28 98 % --   11/19/20 2031 -- -- -- 126 (!) 38 95 % --   11/19/20 2016 94/65 -- -- 134 (!) 47 99 % --   11/19/20 2012 (!) 86/42 -- -- 133 (!) 39 98 % --   11/19/20 2006 87/69 -- -- 135 (!) 41 98 % --   11/19/20 1957 (!) 104/44 -- -- 135 (!) 45 96 % --   11/19/20 1947 (!) 104/32 -- -- 137 (!) 31 98 % --   11/19/20 1943 105/62 -- -- 141 (!) 36 96 % --   11/19/20 1935 -- -- -- 147 (!) 35 96 % --   11/19/20 1932 (!) 102/36 -- -- 160 (!) 38 -- --   11/19/20 1931 -- 101.3 °F (38.5 °C) -- -- -- -- --   11/19/20 1926 -- -- -- -- -- -- 157 lb (71.2 kg)   11/19/20 1911 -- 101.8 °F (38.8 °C) Oral -- -- -- --   11/19/20 1909 -- -- -- 136 (!) 33 91 % --   11/19/20 1908 -- -- -- 135 (!) 36 95 % --   11/19/20 1907 115/60 -- -- 136 (!) 38 94 % --   11/19/20 1906 -- -- -- 136 (!) 31 91 % --   11/19/20 1905 -- -- -- 136 (!) 36 98 % --   11/19/20 1904 -- -- -- 135 (!) 32 98 % --   11/19/20 1903 -- -- -- 133 (!) 42 99 % --   11/19/20 1902 -- -- -- 134 (!) 39 100 % --   11/19/20 1901 (!) 72/27 101.8 °F (38.8 °C) Bladder 135 (!) 34 99 % --   11/19/20 1900 -- -- -- 136 (!) 37 100 % --   11/19/20 1859 -- -- -- 136 (!) 31 100 % --   11/19/20 1858 -- -- -- 135 24 97 % --   11/19/20 1857 (!) 64/26 -- -- 135 (!) 37 90 % --   11/19/20 1856 -- -- -- 135 30 99 % --   11/19/20 1855 (!) 69/29 -- -- 136 (!) 41 96 % --   11/19/20 1854 -- -- -- 135 (!) 37 100 % --   11/19/20 1853 -- -- -- 136 (!) 35 96 % --   11/19/20 1852 -- -- -- 135 (!) 34 95 % --   11/19/20 1851 (!) 63/21 -- -- 136 (!) 35 100 % --   11/19/20 1850 -- -- -- 135 (!) 34 100 % --   11/19/20 1849 (!) 61/25 -- -- 135 (!) 31 99 % --   11/19/20 1848 -- -- -- 136 (!) 33 -- -- 11/19/20 1847 -- -- -- 136 (!) 35 98 % --   11/19/20 1846 -- -- -- 136 (!) 33 -- --   11/19/20 1845 -- -- -- 135 (!) 35 -- --   11/19/20 1844 -- -- -- 137 (!) 35 -- --   11/19/20 1843 -- -- -- 138 (!) 38 -- --   11/19/20 1842 -- -- -- 138 (!) 36 -- --   11/19/20 1841 -- -- -- 139 (!) 37 -- --   11/19/20 1840 -- -- -- 143 (!) 32 -- --   11/19/20 1839 -- -- -- 137 24 -- --   11/19/20 1838 -- -- -- 137 26 100 % --   11/19/20 1837 (!) 72/36 -- -- 137 23 (!) 89 % --   11/19/20 1836 -- -- -- 137 24 (!) 87 % --   11/19/20 1835 -- 101.5 °F (38.6 °C) Oral 137 (!) 33 97 % --   11/19/20 1834 (!) 83/56 -- -- 137 (!) 32 93 % --   11/19/20 1833 -- -- -- 138 (!) 33 97 % --       No intake or output data in the 24 hours ending 11/20/20 1251    Constitutional: Patient in no acute distress   Head: normocephalic, atraumatic   Neck: supple, no jvd  Cardiovascular: regular rate and rhythm, no murmurs, gallops, or rubs   Respiratory: Clear, no rales, rhochi, or wheezes,   Gastrointestinal: soft, nontender, nondistended, no hepatosplenomegaly  Ext: no edema  Neuro: awake  Skin: dry, no rash   Back: nontender    Data:    Recent Labs     11/19/20  1855 11/20/20  0237   WBC 24.7* 39.7*   HGB 11.6* 9.5*   HCT 39.1 31.0*   .0* 100.3*    205       Recent Labs     11/19/20  1855  11/20/20  0237 11/20/20  0701 11/20/20  1007 11/20/20  1156 11/20/20  1218   *   < > 157* 157* 155* 154*  --    K 4.8   < > 4.3 4.4 4.3 4.2  --    *   < > 126* 127* 125* 123*  --    CO2 24   < > 24 24 25 24  --    CREATININE 2.6*   < > 2.3* 2.1* 2.0* 1.9*  --    BUN 63*   < > 54* 51* 49* 47*  --    LABGLOM 23   < > 27 30 32 33  --    GLUCOSE 188*   < > 189* 165* 154* 153* 135   CALCIUM 8.4*   < > 7.3* 7.5* 7.5* 7.5*  --    PHOS  --   --  3.4  --   --   --   --    MG 2.4  --  2.1  --   --   --   --     < > = values in this interval not displayed.        No results found for: VITD25    No results found for: PTH    Recent Labs     11/19/20 1855 11/20/20  0237   * 174*   * 274*   ALKPHOS 393* 292*   BILITOT 0.6 0.6   BILIDIR  --  0.4*       Recent Labs     11/19/20  1855 11/20/20  0237   LABALBU 3.1* 2.5*       No results found for: FERRITIN, IRON, TIBC    No results found for: DVAZFLIR27    No results found for: FOLATE      Lab Results   Component Value Date    COLORU DIANNA 11/19/2020    LABSPEC 1.025 11/09/2020    NITRU Negative 11/19/2020    GLUCOSEU Negative 11/19/2020    KETUA Negative 11/19/2020    UROBILINOGEN 2.0 11/19/2020    BILIRUBINUR SMALL 11/19/2020    BILIRUBINUR Negative 11/09/2020       Lab Results   Component Value Date/Time    OSMOU 643 11/20/2020 02:37 AM       No components found for: URIC    No results found for: LIPIDPAN      Assessment and Plan:    1. arf on ckd 3a  Cr 1/2020 was 1.3 with gfr of 55  Started on ivf  Check urine lytes  No hydro on mayra  arf in setting of hypotension and sepsis  Daily bmp  fena <1    2. Hypernatremia  Due to free water deficit  Continue ivf d5  Check bmp q 12    3. Hypotension/sepsis/leukocytosis  Follow on empiric abx  uti /pna  Montiel cx    4. Hypocalcemia  In setting of hypoalbuminemia    [facility].  Noe Hill MD

## 2020-11-20 NOTE — ED NOTES
Spoke with family of patient - brother and sister in law. Updated on patient care and plan. Expressed they would like to speak with doctor taking care of patient.       Veronica Groves RN  11/20/20 8637

## 2020-11-20 NOTE — PROGRESS NOTES
Pharmacy Consultation Note  (Antibiotic Dosing and Monitoring)    Initial consult date: 11/19/2020  Consulting physician: Dr. Poli Calvillo  Drug(s): Vancomycin  Indication: Sepsis    Ht Readings from Last 1 Encounters:   10/30/20 5' 7\" (1.702 m)     Wt Readings from Last 1 Encounters:   11/19/20 157 lb (71.2 kg)     Age/  Gender IBW DW  Allergy Information   80 y.o.   male 66.1 kg 71.2 kg  Patient has no known allergies. Date  Tmax WBC BUN/CR UOP  (mL/kg/hr) Drug/Dose Time   Given Level(s)   (Time) Comments   11/19  (#1) 101.8 24.7 63/2.6 -- Vancomycin 1750 mg IV x 1 2015 11/20  (#2) 99.7 39.7 51/2.1 -- Vancomycin 1,000 mg IV x 1 <2000> Random level @ 1841 = 12.3 mcg/mL        (#3)             (#4)             (#5)             (#6)             (#7)             CrCl cannot be calculated (Unknown ideal weight.). UOP over the past 24 hours:     No intake or output data in the 24 hours ending 11/20/20 0841    Other anti-infectives: Anti-infective Dose Date Initiated Date Stopped   Cefepime 2g IV q24hr 11/19    Doxycycline 100 mg IV q12hr 11/20      Cultures:  available culture and sensitivity results were reviewed in EPIC  Cultures sent and are pending. Culture Date Result    Blood cx 1 11/19    Blood cx 2 11/19    Urine cx 11/19    Respiratory panel, molecular 11/19 Not detected   Strep/legionella urine antigens 11/20                Assessment:  · Consulted by Dr. Arnold Ling to dose/monitor vancomycin  · Goal trough level:  15-20 mcg/mL  · Pt is a 80 yoM who [facility] with sepsis 2/2 pneumonia versus UTI.    · Serum creatinine today: 2.1 mg/dL; CrCl ~ 24 mL/min; baseline Scr ~ 1.2-1.3 mg/dL  · Vancomycin 1750 mg IV x 1 given 11/19 @ 2015    Plan:  · Check random level today @ 1800 and re-dose if level < 20 mcg/mL  · Follow renal function, dose based on levels for now  · Pharmacist will follow and monitor/adjust dosing as necessary      Thank you for the consult,    Patricia Gaytan PharmD, BCPS 11/20/2020 8:41 AM   Ext: [phone]    Addendum:    Random level @ 1841 = 12.3 mcg/mL, give Vancomycin 1,000 mg IV x 1    Leah HarringtonD, BCPS 11/20/2020 7:23 PM   [phone]

## 2020-11-20 NOTE — H&P
Department of Internal Medicine  History and Physical    PCP: Dr. Jose Tomas   Admitting Physician: Dr. Katarina Pollock  Consultants: Dr. Nhan Lutz:  Shortness of breath    HISTORY OF PRESENT ILLNESS:    Patient's is 42-year-old male presents to the ED from nursing home due to syncope. At baseline he is alert and oriented x0. They were taking him to the bathroom when he had a syncopal event. Patient is alert but unable to provide history secondary to his dementia. History obtained from EMR and ED staff. PAST MEDICAL Hx:  Past Medical History:   Diagnosis Date    Chronic kidney disease     [facility]) [facility]     Dementia ([facility]     Depression     Hypertension     Neuromuscular disorder ([facility]     Other disorders of kidney and ureter in diseases classified elsewhere     Pneumonia     Psychosis ([facility]        PAST SURGICAL Hx:   Past Surgical History:   Procedure Laterality Date    ABDOMEN SURGERY      APPENDECTOMY      CHOLECYSTECTOMY      COLONOSCOPY      ENDOSCOPY, COLON, DIAGNOSTIC      KIDNEY REMOVAL      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 Authorizing Provider   cefdinir (OMNICEF) 300 MG capsule Take 1 capsule by mouth 2 times daily for 7 days 11/19/20 11/26/20  Mono Haq MD   miconazole (MICOTIN) 2 % cream Apply topically to affected area 2 times daily until resolved, then as needed.  11/11/20   Mono Haq MD   MUCINEX 600 MG extended release tablet Take 1 tablet by mouth 2 times daily *DO NOT CRUSH* 11/2/20   Mono Haq MD   OLANZapine (ZYPREXA) 5 MG tablet TAKE 1 TABLET BY MOUTH DAILY AT BEDTIME 10/1/20   Mono Haq MD   cyanocobalamin 1000 MCG/ML injection  8/15/20   Historical Provider, MD   tamsulosin (FLOMAX) 0.4 MG capsule TAKE 1 CAPSULE BY MOUTH DAILY *DO NOT CRUSH, MAGNESIA CONCENTRATE) 2400 MG/10ML SUSP Take 30 mLs by mouth daily as needed  7/20/16   Historical Provider, MD   acetaminophen 650 MG TABS Take 650 mg by mouth every 6 hours as needed for Pain. Patient taking differently: Take 650 mg by mouth every 4 hours as needed for Pain or Fever  9/13/13   Sudheer Roman DO, FACOI       ALLERGIES:  Patient has no known allergies. SOCIAL Hx:  Social History     Socioeconomic History    Marital status:      Spouse name: Not on file    Number of children: Not on file    Years of education: Not on file    Highest education level: Not on file   Occupational History    Not on file   Social Needs    Financial resource strain: Not on file    Food insecurity     Worry: Not on file     Inability: Not on file    Transportation needs     Medical: Not on file     Non-medical: Not on file   Tobacco Use    Smoking status: Former Smoker     Years: 25.00    Smokeless tobacco: Never Used   Substance and Sexual Activity    Alcohol use: No    Drug use: No    Sexual activity: Never   Lifestyle    Physical activity     Days per week: Not on file     Minutes per session: Not on file    Stress: Not on file   Relationships    Social connections     Talks on phone: Not on file     Gets together: Not on file     Attends Advent service: Not on file     Active member of club or organization: Not on file     Attends meetings of clubs or organizations: Not on file     Relationship status: Not on file    Intimate partner violence     Fear of current or ex partner: Not on file     Emotionally abused: Not on file     Physically abused: Not on file     Forced sexual activity: Not on file   Other Topics Concern    Not on file   Social History Narrative    Not on file       ROS: Positive in bold. Admitted due to dementia.   General:   Denies chills, fatigue, fever, malaise, night sweats or weight loss    Psychological:   Denies anxiety, disorientation or hallucinations    ENT: to speech and physical stimuli    SKIN:    No bruising or bleeding. No redness, warmth, or swelling    EXTREMITIES:    Peripheral pulses present. No edema, cyanosis, or swelling. OSTEOPATHIC:    Examined in seated and supine positions. Normal thoracic kyphosis and lumbar lordosis. No acute somatic dysfunction. Rdz catheter in place  Left femoral CVC in place    LABORATORY DATA:  CBC with Differential:    Lab Results   Component Value Date    WBC 24.7 11/19/2020    RBC 3.69 11/19/2020    HGB 11.6 11/19/2020    HCT 39.1 11/19/2020     11/19/2020    .0 11/19/2020    MCH 31.4 11/19/2020    MCHC 29.7 11/19/2020    RDW 15.2 11/19/2020    SEGSPCT 53 10/12/2013    METASPCT 2 10/12/2013    LYMPHOPCT 7.0 11/19/2020    MONOPCT 3.0 11/19/2020    MYELOPCT 1 09/12/2013    EOSPCT 8.7 10/06/2020    BASOPCT 0.0 11/19/2020    MONOSABS 0.74 11/19/2020    LYMPHSABS 1.73 11/19/2020    EOSABS 0.00 11/19/2020    BASOSABS 0.00 11/19/2020     CMP:    Lab Results   Component Value Date     11/19/2020    K 4.0 11/19/2020    K 5.0 04/14/2019     11/19/2020    CO2 19 11/19/2020    BUN 51 11/19/2020    CREATININE 2.1 11/19/2020    GFRAA 36 11/19/2020    LABGLOM 30 11/19/2020    GLUCOSE 129 11/19/2020    PROT 7.1 11/19/2020    LABALBU 3.1 11/19/2020    CALCIUM 6.3 11/19/2020    BILITOT 0.6 11/19/2020    ALKPHOS 393 11/19/2020     11/19/2020     11/19/2020       ASSESSMENT/PLAN:  1. Syncope  1. Trend troponin. Consider echocardiogram.  2. Septic shock secondary to pneumonia and possible UTI   1. Placed on broad-spectrum antibiotics with cefepime doxycycline and vancomycin. Placed on Levophed. Continue IV fluids. IV Solu-Cortef. 3. TUNG   1. Secondary to septic shock. Continue IV fluids. 4. Hypernatremia  1. Placed on D5W. Obtain BMP every 6 hours. Nephrology consulted. 5. Lactic acidosis  1. Secondary to septic shock. Trend. Continue IV fluids. 6. Transaminitis secondary to shock  1.  CT abdomen pelvis did not show any evidence of cholecystitis or cholangitis  7. Macrocytic anemia      Charan Harris D.O.  9:27 PM  11/19/2020    Electronically signed by Charan Harris DO on 11/19/20 at 9:27 PM EST    Telehealth visit completed via physician liaison, Dr. Saundra Orellana,  who personally saw and examined the patient. I personally participated in the case including review of pertinent history as augmented in the above medical record and medical decision making on the date of service and I agree with all pertinent clinical formation unless otherwise noted. Geo Valiente D.O., F.A.C.O.I.  11:00 PM  11/19/2020

## 2020-11-20 NOTE — CARE COORDINATION
Emergency Department Social Work Assessment:    Pt presents to the ED from [address] [place] [address] with loss of consciousness. Per SOV liaison More Acosta, she is following for possible admission to SNF. Pt will require PT/OT evaluations. Assigned SW/CM to follow.     Electronically signed by Hulda Holstein, MSW, GERALD on 11/20/2020 at 2:36 PM

## 2020-11-20 NOTE — ED NOTES
Pt noted hypotensive. Presser ordered and initiated. Provider in room inserting CVC a this time.       Tad Brooks RN  11/19/20 8005

## 2020-11-20 NOTE — PROGRESS NOTES
Pharmacy Consultation Note  (Antibiotic Dosing and Monitoring)    Initial consult date: 2020  Consulting physician: Arnold Ling  Drug: Vancomycin  Indication: Septic Shock    Age/  Gender Height Weight IBW Dosing weight  Allergy Information   89 y.o./male   157 lb (71.2 kg)     Patient height not recorded  71.2  Patient has no known allergies. Temp (24hrs), Av.2 °F (38.4 °C), Min:99.7 °F (37.6 °C), Max:101.8 °F (38.8 °C)          Date  WBC BUN SCr CrCl  (mL/min) Drug/Dose Time   Given Level(s)   (Time) Comments   2020 24.7 63 2.1  23.6   Vancomycin 1750 mg IV                                            No intake or output data in the 24 hours ending 20 0114    Historical Cultures:  Organism   Date Value Ref Range Status   04/15/2019 Escherichia coli (A)  Final     No results for input(s): BC in the last 72 hours. Cultures:  available culture and sensitivity results were reviewed in [facility]    Assessment:  · 80 y.o. male has been initiated Vancomycin.   · Estimated CrCl = 23.6 mL/min  · Goal trough level = 15-20 mcg/mL    Plan:  · Monitor renal function   · Clinical pharmacy to follow      AVE BRAGG [facility] [place] [facility] 2020 1:14 AM

## 2020-11-20 NOTE — ED NOTES
Critical lab values called from lab, physician made aware of all results     Olga Beyer RN  11/20/20 2243

## 2020-11-20 NOTE — PROGRESS NOTES
Internal Medicine Progress Note    [facility]    Carisa Stevenson. Kanwal Lee., HECTOR.ROMIOPratimaI. Lisa Dunn D.O., LISSETTOJAYME Randall D.O. Radha Winkler, MSN, APRN, NP-C  Nadine Verdugo. Mancil Severance, MSN, APRN-CNP     Primary Care Physician: Selene Garcia MD   Admitting Physician:  Vicky Trejo DO  Admission date and time: 11/19/2020  6:24 PM    Room:  01/01  Admitting diagnosis: Septic shock ([facility] [address] [A41.9, R65.21]    Patient Name: Rodrigo Read  MRN: [id number]    Date of Service: 11/20/2020     Subjective:  Katelyn Thao is a 80 y.o. male who was seen and examined today,11/20/2020, at the bedside. Patient seen in the emergency room, awaiting admission to ICU. Bed availability is a problem. Patient does open his eyes and stare blankly. Did not engage in conversations. Seems uncomfortable. No family present during my examination. Review of System: Unobtainable at the present time due to medical condition      Physical Exam:  No intake/output data recorded. No intake or output data in the 24 hours ending 11/20/20 1325No intake/output data recorded. Patient Vitals for the past 96 hrs (Last 3 readings):   Weight   11/19/20 1926 157 lb (71.2 kg)     Vital Signs:   Blood pressure (!) 128/94, pulse 94, temperature 98.2 °F (36.8 °C), temperature source Core, resp. rate 28, weight 157 lb (71.2 kg), SpO2 100 %. General appearance:  Stare blankly into space. Appears dehydrated and chronically ill  Head:  Normocephalic. No masses, lesions or tenderness. Eyes:  PERRLA. EOMI. Sclera clear. Buccal mucosa moist.  Bilateral conjunctivitis  ENT:  Ears normal.  Buccal mucosa very dry  Neck:    Supple. Trachea midline. No thyromegaly. No JVD. No bruits. Heart:    Rhythm regular. Mildly tachycardic. 1/6 murmurs murmurs. Lungs:    Symmetrical. Clear to auscultation bilaterally. No wheezes. No rhonchi. No rales diminished excursion. Abdomen:   Soft. Non-tender. Non-distended. Bowel sounds positive. No organomegaly or masses. No pain on palpation. Extremities:    Peripheral pulses present. No peripheral edema. No ulcers. No cyanosis. No clubbing. Tenting of skin. Dressing to left heel. Triple-lumen catheter left femoral  Neurologic:    Eyes open, stare blankly. Does not follow command. Musculoskeletal:   Spine ROM normal.  Gait not assessed. Integumentary:  No rashes,  poor skin turgor.   Multiple tattoos  Genitalia/Breast:  Rdz catheter    Medication:  Scheduled Meds:   aspirin  81 mg Oral Daily    sodium chloride flush  10 mL Intravenous 2 times per day    heparin (porcine)  5,000 Units Subcutaneous 3 times per day    [START ON 11/21/2020] cefepime  2 g Intravenous Q24H    vancomycin (VANCOCIN) intermittent dosing (placeholder)   Other RX Placeholder    hydrocortisone (SOLU-CORTEF) IVPB  50 mg Intravenous 2 times per day    pantoprazole  40 mg Intravenous Daily    And    sodium chloride (PF)  10 mL Intravenous Daily    doxycycline (VIBRAMYCIN) IV  100 mg Intravenous Q12H     Continuous Infusions:   dextrose      [START ON 11/21/2020] sodium chloride      sodium chloride 100 mL/hr at 11/20/20 1113    dextrose         Objective Data:  CBC with Differential:    Lab Results   Component Value Date    WBC 39.7 11/20/2020    RBC 3.09 11/20/2020    HGB 9.5 11/20/2020    HCT 31.0 11/20/2020     11/20/2020    .3 11/20/2020    MCH 30.7 11/20/2020    MCHC 30.6 11/20/2020    RDW 15.0 11/20/2020    SEGSPCT 53 10/12/2013    METASPCT 2 10/12/2013    LYMPHOPCT 3.4 11/20/2020    MONOPCT 5.5 11/20/2020    MYELOPCT 0.8 11/20/2020    EOSPCT 8.7 10/06/2020    BASOPCT 0.3 11/20/2020    MONOSABS 0.00 11/20/2020    LYMPHSABS 1.19 11/20/2020    EOSABS 0.00 11/20/2020    BASOSABS 0.00 11/20/2020     CMP:    Lab Results   Component Value Date     11/20/2020    K 4.2 11/20/2020    K 5.0 04/14/2019     11/20/2020    CO2 24 11/20/2020    BUN 47 11/20/2020 counseling/coordinating care with the patient and/or family with face to face contact. This time was spent reviewing notes and laboratory data as well as instructing and counseling the patient. Time I spent with the family or surrogate(s) is included only if the patient was incapable of providing the necessary information or participating in medical decisions. I also discussed the differential diagnosis and all of the proposed management plans with the patient and individuals accompanying the patient. Twylla Lipoma requires this high level of physician care and nursing on the ICU unit due the complexity of decision management and chance of rapid decline or death. Continued cardiac monitoring and higher level of nursing are required. I am readily available for any further decision-making and intervention.      Carlos Valiente DO, F.A.C.O.I.  11/20/2020  1:25 PM

## 2020-11-21 NOTE — PROGRESS NOTES
Pharmacy Consultation Note  (Antibiotic Dosing and Monitoring)    Initial consult date: 11/19/2020  Consulting physician: Dr. Kirti Browning  Drug(s): Vancomycin  Indication: Sepsis    Ht Readings from Last 1 Encounters:   11/21/20 5' 7\" (1.702 m)     Wt Readings from Last 1 Encounters:   11/21/20 149 lb 8 oz (67.8 kg)     Age/  Gender IBW DW  Allergy Information   80 y.o.   male 66.1 kg 71.2 kg  Patient has no known allergies. Date  Tmax WBC BUN/CR UOP  (mL/kg/hr) Drug/Dose Time   Given Level(s)   (Time) Comments   11/19  (#1) 101.8 24.7 63/2.6 -- Vancomycin 1750 mg IV x 1 2015 11/20  (#2) 99.7 39.7 51/2.1 -- Vancomycin 1,000 mg IV x 1 2053 Random level @ 1841 = 12.3 mcg/mL      11/21  (#3) afebrile 16.3 41/1.5 1 Vancomycin 1,000 mg IV x 1 <2100> 16.3 mcg/mL @ 0531      (#4)             (#5)             (#6)             (#7)             Estimated Creatinine Clearance: 31 mL/min (A) (based on SCr of 1.5 mg/dL (H)). UOP over the past 24 hours:       Intake/Output Summary (Last 24 hours) at 11/21/2020 1341  Last data filed at 11/21/2020 1304  Gross per 24 hour   Intake 5156 ml   Output 1575 ml   Net 3581 ml       Other anti-infectives: Anti-infective Dose Date Initiated Date Stopped   Cefepime 2g IV q24hr 11/19    Doxycycline 100 mg IV q12hr 11/20      Cultures:  available culture and sensitivity results were reviewed in EPIC  Cultures sent and are pending. Culture Date Result    Blood cx 1 11/19 NGTD   Blood cx 2 11/19 Gram negative rods   Urine cx 11/19 Mixed julianne   Respiratory panel, molecular 11/19 Not detected   Strep/legionella urine antigens 11/20                Assessment:  · Consulted by Dr. Nahomi Sheth to dose/monitor vancomycin  · Goal trough level:  15-20 mcg/mL  · Pt is a 80 yoM who [facility] with sepsis 2/2 pneumonia versus UTI.    · Serum creatinine today: 1.5 mg/dL; CrCl ~ 30 mL/min; baseline Scr ~ 1.2-1.3 mg/dL  · 11/20: Vancomycin 1750 mg IV x 1 given 11/19 @ 2015  · 11/21: Random AM level = 16.3 mcg/mL    Plan:  · Vancomycin 1,000 mg IV x 1 tonight  · Follow renal function, dose based on levels for now  · Pharmacist will follow and monitor/adjust dosing as necessary      Thank you for the consult,    Leah VicenteD, BCPS 11/21/2020 1:46 PM   [phone]

## 2020-11-21 NOTE — PROGRESS NOTES
CRITICAL CARE PROGRESS NOTE    The patient's case was discussed in multidisciplinary rounds including critical care specialist, nursing, RT and pharmacy. His evaluation is as follows:        80year old man with PMH as described below admitted to ICU for management of septic shock, pneumonia, urinary tract infection, dehydration, metabolic acidosis, metabolic encephalopathy.     --Still on norepinephrine infusion, has gram negative bacteremia  --Hypernatremia is being managed with 1/2NS infusion   --TUNG is resolving with hydration     Recent Labs     11/21/20  0531   *   K 3.9   *   CO2 23   BUN 41*   CREATININE 1.5*   GLUCOSE 128*   CALCIUM 7.4*     Recent Labs     11/21/20  0531   WBC 16.3*   RBC 3.00*   HGB 9.3*   HCT 30.7*   .3*   MCH 31.0   MCHC 30.3*   RDW 14.7      MPV 12.3*     Recent Labs     11/19/20  1850   BC 24 Hours no growth     Recent Labs     11/19/20  1855   BLOODCULT2 Gram stain performed from blood culture bottle media  Gram negative rods  *  Identification and sensitivity to follow     24 HR INTAKE/OUTPUT:      Intake/Output Summary (Last 24 hours) at 11/21/2020 1100  Last data filed at 11/21/2020 0234  Gross per 24 hour   Intake 1000 ml   Output 1575 ml   Net -575 ml     MEDICATIONS:   hydrocortisone sodium succinate PF  50 mg Intravenous Q8H    aspirin  81 mg Oral Daily    sodium chloride flush  10 mL Intravenous 2 times per day    heparin (porcine)  5,000 Units Subcutaneous 3 times per day    cefepime  2 g Intravenous Q24H    vancomycin (VANCOCIN) intermittent dosing (placeholder)   Other RX Placeholder    pantoprazole  40 mg Intravenous Daily    And    sodium chloride (PF)  10 mL Intravenous Daily    ipratropium-albuterol  1 ampule Inhalation Q4H WA    doxycycline (VIBRAMYCIN) IV  100 mg Intravenous Q12H      norepinephrine Stopped (11/21/20 0910)    dextrose      sodium chloride 12.5 mL/hr at 11/21/20 0631    sodium chloride 100 mL/hr at 11/21/20 0229     sodium chloride flush, acetaminophen **OR** acetaminophen, polyethylene glycol, glucose, dextrose, glucagon (rDNA), dextrose, perflutren lipid microspheres    OBJECTIVE:  Vitals:    11/21/20 1030   BP: (!) 91/39   Pulse: 84   Resp: (!) 32   Temp:    SpO2: 99%     FiO2 : 4 %  O2 Flow Rate (L/min): 2 L/min  O2 Device: Nasal cannula        LABS:  WBC   Date Value Ref Range Status   11/21/2020 16.3 (H) 4.5 - 11.5 E9/L Final   11/20/2020 39.7 (H) 4.5 - 11.5 E9/L Final   11/19/2020 24.7 (H) 4.5 - 11.5 E9/L Final     Hemoglobin   Date Value Ref Range Status   11/21/2020 9.3 (L) 12.5 - 16.5 g/dL Final   11/20/2020 9.5 (L) 12.5 - 16.5 g/dL Final   11/19/2020 11.6 (L) 12.5 - 16.5 g/dL Final     Hematocrit   Date Value Ref Range Status   11/21/2020 30.7 (L) 37.0 - 54.0 % Final   11/20/2020 31.0 (L) 37.0 - 54.0 % Final   11/19/2020 39.1 37.0 - 54.0 % Final     MCV   Date Value Ref Range Status   11/21/2020 102.3 (H) 80.0 - 99.9 fL Final   11/20/2020 100.3 (H) 80.0 - 99.9 fL Final   11/19/2020 106.0 (H) 80.0 - 99.9 fL Final     Platelets   Date Value Ref Range Status   11/21/2020 181 130 - 450 E9/L Final   11/20/2020 205 130 - 450 E9/L Final   11/19/2020 262 130 - 450 E9/L Final     Sodium   Date Value Ref Range Status   11/21/2020 150 (H) 132 - 146 mmol/L Final   11/20/2020 154 (H) 132 - 146 mmol/L Final   11/20/2020 155 (H) 132 - 146 mmol/L Final     Potassium   Date Value Ref Range Status   11/21/2020 3.9 3.5 - 5.0 mmol/L Final   11/20/2020 4.2 3.5 - 5.0 mmol/L Final   11/20/2020 4.3 3.5 - 5.0 mmol/L Final     Potassium reflex Magnesium   Date Value Ref Range Status   04/14/2019 5.0 3.5 - 5.0 mmol/L Final     Chloride   Date Value Ref Range Status   11/21/2020 118 (H) 98 - 107 mmol/L Final   11/20/2020 123 (H) 98 - 107 mmol/L Final   11/20/2020 125 (H) 98 - 107 mmol/L Final     CO2   Date Value Ref Range Status   11/21/2020 23 22 - 29 mmol/L Final   11/20/2020 24 22 - 29 mmol/L Final   11/20/2020 25 22 - 29 mmol/L Final     BUN   Date Value Ref Range Status   11/21/2020 41 (H) 8 - 23 mg/dL Final   11/20/2020 47 (H) 8 - 23 mg/dL Final   11/20/2020 49 (H) 8 - 23 mg/dL Final     CREATININE   Date Value Ref Range Status   11/21/2020 1.5 (H) 0.7 - 1.2 mg/dL Final   11/20/2020 1.9 (H) 0.7 - 1.2 mg/dL Final   11/20/2020 2.0 (H) 0.7 - 1.2 mg/dL Final     Glucose   Date Value Ref Range Status   11/21/2020 128 (H) 74 - 99 mg/dL Final   11/21/2020 121 mg/dL Final   11/20/2020 135 mg/dL Final     Calcium   Date Value Ref Range Status   11/21/2020 7.4 (L) 8.6 - 10.2 mg/dL Final   11/20/2020 7.5 (L) 8.6 - 10.2 mg/dL Final   11/20/2020 7.5 (L) 8.6 - 10.2 mg/dL Final     Total Protein   Date Value Ref Range Status   11/21/2020 5.5 (L) 6.4 - 8.3 g/dL Final   11/20/2020 5.7 (L) 6.4 - 8.3 g/dL Final   11/19/2020 7.1 6.4 - 8.3 g/dL Final     Alb   Date Value Ref Range Status   11/21/2020 2.4 (L) 3.5 - 5.2 g/dL Final   11/20/2020 2.5 (L) 3.5 - 5.2 g/dL Final   11/19/2020 3.1 (L) 3.5 - 5.2 g/dL Final     Total Bilirubin   Date Value Ref Range Status   11/21/2020 0.4 0.0 - 1.2 mg/dL Final   11/20/2020 0.6 0.0 - 1.2 mg/dL Final   11/19/2020 0.6 0.0 - 1.2 mg/dL Final     Alkaline Phosphatase   Date Value Ref Range Status   11/21/2020 217 (H) 40 - 129 U/L Final   11/20/2020 292 (H) 40 - 129 U/L Final   11/19/2020 393 (H) 40 - 129 U/L Final     AST   Date Value Ref Range Status   11/21/2020 64 (H) 0 - 39 U/L Final   11/20/2020 274 (H) 0 - 39 U/L Final   11/19/2020 404 (H) 0 - 39 U/L Final     ALT   Date Value Ref Range Status   11/21/2020 95 (H) 0 - 40 U/L Final   11/20/2020 174 (H) 0 - 40 U/L Final   11/19/2020 210 (H) 0 - 40 U/L Final     GFR Non-   Date Value Ref Range Status   11/21/2020 44 >=60 mL/min/1.73 Final     Comment:     Chronic Kidney Disease: less than 60 ml/min/1.73 sq.m. Kidney Failure: less than 15 ml/min/1.73 sq.m. Results valid for patients 18 years and older.      11/20/2020 33 >=60 mL/min/1.73 Final palpated  Respiratory: Lungs with diminished breath sounds bilaterally, no adventitious sounds auscultated, no accessory muscle use  CV: Regular rate, no murmurs, no JVD, no leg edema  Abdomen: Soft, non tender, + bowel sounds, no lesions  Skin: Hydrated, adequate turgor, no rash, capillary refill <2 seconds  Extremities: Muscular strength 3/5 in 4 limbs, moves 4 limbs spontaneously, distal pulses present  Neurology: Awake and alert, does not follows commands, moves 4 limbs spontaneously,  no meningitic signs present. A/P:  Septic shock, acute hypoxemic respiratory failure secondary to bacteremia, pneumonia and pyelonephritis  --Antibiotics regimen: Cefepime/vancomycin and doxycicline  --Cultures reviewed   --Vasopressor support with norepinephrine drip  --Steroid supplementation: Hydrocortisone    Acute kidney injury secondary to sepsis/hypovolemia/prerenal  --Avoid nephrotoxins and dose medications according GFR    Hypernatremia  --Continue 1/2NS infusion     Metabolic encephalopathy of multifactorial etiology  --Reorientation    DVT prophylaxis with heparin SQ  Nutrition: NPO for now  Vascular catheters: TLC needed for vasopressor administration   Urinary catheter needed for strict input/output  GI prophylaxis with PPI  Goals of care: DNR-CCA      ATTESTATION:  ICU Staff Physician note of personal involvement in Care  As the attending physician, I certify that I personally reviewed the patients history and personnally examined the patient to confirm the physical findings described above,  And that I reviewed the relevant imaging studies and available reports. I also discussed the differential diagnosis and all of the proposed management plans with the patient and individuals accompanying the patient to this visit. They had the opportunity to ask questions about the proposed management plans and to have those questions answered.      This patient has a high probability of sudden, clinically significant

## 2020-11-21 NOTE — PROGRESS NOTES
Internal Medicine Progress Note    [facility]    Ruperto Valles. Ramya Quigley., LISSETTOJAYME Sparrow D.O., PRINCESS Bower, MSN, APRN, NP-MARY Ramriez. Jolene Cottrell, MSN, APRN-CNP     Primary Care Physician: Last Plunkett MD   Admitting Physician:  Verónica Armstrong DO  Admission date and time: 11/19/2020  6:24 PM    Room:  [place] [address]  Admitting diagnosis: Septic shock ([facility] [address] [A41.9, R65.21]    Patient Name: Pavel Kaplan  MRN: [id number]    Date of Service: 11/21/2020     Subjective:  Wing Jorge is a 80 y.o. male who was seen and examined today,11/21/2020, at the bedside. Wing Jorge awakens briefly during my examination but appears profoundly tired and fatigued. He has been resumed on vasopressor therapy. He passed a swallow study earlier today and will be instituted on a diet. Review of System:   Unable to be obtained in the patient's current condition with underlying dementia      Physical Exam:  No intake/output data recorded. Intake/Output Summary (Last 24 hours) at 11/21/2020 1038  Last data filed at 11/21/2020 0234  Gross per 24 hour   Intake 1000 ml   Output 1575 ml   Net -575 ml   I/O last 3 completed shifts: In: 1000 [IV Piggyback:1000]  Out: 9716 [Urine:1575]  Patient Vitals for the past 96 hrs (Last 3 readings):   Weight   11/21/20 0440 149 lb 8 oz (67.8 kg)   11/19/20 1926 157 lb (71.2 kg)     Vital Signs:   Blood pressure (!) 96/56, pulse 93, temperature 98.6 °F (37 °C), temperature source Core, resp. rate 22, weight 149 lb 8 oz (67.8 kg), SpO2 99 %. General appearance:  Awakens to verbal stimulus. Does not answer questions nor does he follow commands. Appears chronically ill. Head:  Normocephalic. No masses, lesions or tenderness. Eyes:  PERRLA. EOMI. Sclera clear. Buccal mucosa moist.  Bilateral conjunctivitis  ENT:  Ears normal.  Buccal mucosa very dry  Neck:    Supple. Trachea midline. No thyromegaly. No JVD.  No bruits. Heart:    Rhythm regular. Mildly tachycardic. 1/6 systolic murmur  Lungs:    Symmetrical. Clear to auscultation bilaterally. No wheezes. No rhonchi. No rales diminished excursion. Abdomen:   Soft. Non-tender. Non-distended. Bowel sounds positive. No organomegaly or masses. No pain on palpation. Extremities:    Peripheral pulses present. No peripheral edema. No ulcers. No cyanosis. No clubbing. Tenting of skin. Dressing to left heel. Triple-lumen catheter left femoral  Neurologic:    Awakens to verbal stimulus. Eyes are open but stares blankly. Does not follow commands nor does answer questions. Musculoskeletal:   Spine ROM normal.  Gait not assessed. Integumentary:  No rashes,  poor skin turgor. Multiple tattoos  Genitalia/Breast:  Voiding with use of a Rdz catheter.     Medication:  Scheduled Meds:   hydrocortisone sodium succinate PF  50 mg Intravenous Q8H    aspirin  81 mg Oral Daily    sodium chloride flush  10 mL Intravenous 2 times per day    heparin (porcine)  5,000 Units Subcutaneous 3 times per day    cefepime  2 g Intravenous Q24H    vancomycin (VANCOCIN) intermittent dosing (placeholder)   Other RX Placeholder    pantoprazole  40 mg Intravenous Daily    And    sodium chloride (PF)  10 mL Intravenous Daily    ipratropium-albuterol  1 ampule Inhalation Q4H WA    doxycycline (VIBRAMYCIN) IV  100 mg Intravenous Q12H     Continuous Infusions:   norepinephrine Stopped (11/21/20 0910)    dextrose      sodium chloride 12.5 mL/hr at 11/21/20 0631    sodium chloride 100 mL/hr at 11/21/20 0229       Objective Data:  CBC with Differential:    Lab Results   Component Value Date    WBC 16.3 11/21/2020    RBC 3.00 11/21/2020    HGB 9.3 11/21/2020    HCT 30.7 11/21/2020     11/21/2020    .3 11/21/2020    MCH 31.0 11/21/2020    MCHC 30.3 11/21/2020    RDW 14.7 11/21/2020    SEGSPCT 53 10/12/2013    METASPCT 2 10/12/2013    LYMPHOPCT 11.6 11/21/2020    MONOPCT 6.4 11/21/2020    MYELOPCT 0.8 11/20/2020    EOSPCT 8.7 10/06/2020    BASOPCT 0.3 11/21/2020    MONOSABS 1.05 11/21/2020    LYMPHSABS 1.89 11/21/2020    EOSABS 0.10 11/21/2020    BASOSABS 0.05 11/21/2020     CMP:    Lab Results   Component Value Date     11/21/2020    K 3.9 11/21/2020    K 5.0 04/14/2019     11/21/2020    CO2 23 11/21/2020    BUN 41 11/21/2020    CREATININE 1.5 11/21/2020    GFRAA 53 11/21/2020    LABGLOM 44 11/21/2020    GLUCOSE 128 11/21/2020    PROT 5.5 11/21/2020    LABALBU 2.4 11/21/2020    CALCIUM 7.4 11/21/2020    BILITOT 0.4 11/21/2020    ALKPHOS 217 11/21/2020    AST 64 11/21/2020    ALT 95 11/21/2020           Assessment:  1. Septic shock with multiple infectious sources including gram-negative bacteremia, healthcare associated pneumonia, and urinary tract infection  2. Hypernatremia with significant free water deficit  3. Acute renal failure that is multifactorial in nature  4. Shock liver with transaminitis  5. Macrocytic anemia  6. Elevated troponin probably secondary to demand ischemia versus non-ST elevated myocardial infarction  7. BPH  8. Severe dementia    Plan:   Unfortunately, the patient has been placed back on vasopressor therapy. IV fluid adjustments have been made as per the nephrology team.  Broad-spectrum antibiotic therapy is being employed and the infectious disease team is providing consultation. The patient was capable of passing a swallow study today and a diet will be instituted. He is profoundly weak and deconditioned. Unfortunately, his long-term prognosis is poor. Greater than 40 minutes of critical care time was spent with the patient. This time included chart review, , and discussion with those consultants involved in the patient's care. More than 50% of my  time was spent at the bedside counseling/coordinating care with the patient and/or family with face to face contact.   This time was spent reviewing notes and laboratory data as well as instructing and counseling the patient. Time I spent with the family or surrogate(s) is included only if the patient was incapable of providing the necessary information or participating in medical decisions. I also discussed the differential diagnosis and all of the proposed management plans with the patient and individuals accompanying the patient. Gavin Guzman requires this high level of physician care and nursing on the ICU unit due the complexity of decision management and chance of rapid decline or death. Continued cardiac monitoring and higher level of nursing are required. I am readily available for any further decision-making and intervention.      Bettie Guzman DO, F.A.C.O.I.  11/21/2020  10:38 AM

## 2020-11-21 NOTE — CONSULTS
Yes Historical Provider, MD   miconazole (MICOTIN) 2 % cream Apply 1 each topically 2 times daily   Yes Historical Provider, MD   miconazole (MICOTIN) 2 % cream Apply 1 each topically as needed (Preventative Health Maintenance)   Yes Historical Provider, MD   furosemide (LASIX) 20 MG tablet Take 20 mg by mouth daily as needed (Leg Swelling)   Yes Historical Provider, MD   mirtazapine (REMERON) 15 MG tablet Take 15 mg by mouth nightly   Yes Historical Provider, MD   tamsulosin (FLOMAX) 0.4 MG capsule Take 0.4 mg by mouth daily   Yes Historical Provider, MD   ondansetron (ZOFRAN) 4 MG tablet Take 4 mg by mouth every 8 hours as needed for Nausea or Vomiting   Yes Historical Provider, MD   OLANZapine (ZYPREXA) 5 MG tablet Take 5 mg by mouth nightly   Yes Historical Provider, MD   NONFORMULARY Inhale 0.5 mg into the lungs 3 times daily Ipratropium Bromide Solution 0.03%   Yes Historical Provider, MD   cefdinir (OMNICEF) 300 MG capsule Take 1 capsule by mouth 2 times daily for 7 days 11/19/20 11/26/20 Yes Kamari Andre MD   MUCINEX 600 MG extended release tablet Take 1 tablet by mouth 2 times daily *DO NOT CRUSH* 11/2/20  Yes Kamari Andre MD   cyanocobalamin 1000 MCG/ML injection Inject 1,000 mcg into the muscle every 30 days 18th of the month 8/18/20  Yes Historical Provider, MD   bethanechol (URECHOLINE) 10 MG tablet TAKE 1 TABLET BY MOUTH 3 TIMES DAILY 5/19/20  Yes Kamari Andre MD   rivastigmine (EXELON) 4.6 MG/24HR Place 1 patch onto the skin daily  7/20/16  Yes Historical Provider, MD   dextromethorphan-guaiFENesin (QC TUSSIN DM COUGH/CONGESTION)  MG/5ML syrup Take 10 mLs by mouth every 4 hours as needed for Cough 7/25/16  Yes Kamari Andre MD   promethazine (PHENERGAN) 25 MG/ML injection Inject 25 mg into the muscle every 6 hours as needed (nausea/vomiting)  3/22/19  Yes Historical Provider, MD   mineral oil-hydrophilic petrolatum (AQUAPHOR) ointment Apply 1 Container topically as needed for Dry Skin  11/29/16  Yes Selene Garcia MD   polyvinyl alcohol (ARTIFICIAL TEARS) 1.4 % ophthalmic solution Place 1 drop into both eyes as needed for Dry Eyes 7/20/16  Yes Selene Garcia MD   bisacodyl (BISAC-EVAC) 10 MG suppository Place 10 mg rectally daily as needed for Constipation 7/20/16  Yes Selene Garcia MD   ASPIRIN LOW DOSE 81 MG EC tablet Take 1 tablet by mouth daily 7/20/16  Yes Historical Provider, MD   OXYGEN 2L NC; Titrate to SpO2 92-95%.   Patient taking differently: Inhale 2-4 L into the lungs continuous Maintain p/o over 90% every shift 6/10/16  Yes Antiem T Butts, DO   magnesium hydroxide (MILK OF MAGNESIA CONCENTRATE) 2400 MG/10ML SUSP Take 30 mLs by mouth daily as needed (Constipation)  7/20/16  Yes Historical Provider, MD        aspirin EC tablet 81 mg, Daily  sodium chloride flush 0.9 % injection 10 mL, 2 times per day  sodium chloride flush 0.9 % injection 10 mL, PRN  acetaminophen (TYLENOL) tablet 650 mg, Q6H PRN    Or  acetaminophen (TYLENOL) suppository 650 mg, Q6H PRN  polyethylene glycol (GLYCOLAX) packet 17 g, Daily PRN  glucose (GLUTOSE) 40 % oral gel 15 g, PRN  dextrose 50 % IV solution, PRN  glucagon (rDNA) injection 1 mg, PRN  dextrose 5 % solution, PRN  heparin (porcine) injection 5,000 Units, 3 times per day  [START ON 11/21/2020] cefepime (MAXIPIME) 2 g IVPB extended (mini-bag), Q24H    And  [START ON 11/21/2020] 0.9 % sodium chloride infusion, Q24H  vancomycin (VANCOCIN) intermittent dosing (placeholder), RX Placeholder  0.45 % sodium chloride infusion, Continuous  perflutren lipid microspheres (DEFINITY) injection 1.65 mg, ONCE PRN  pantoprazole (PROTONIX) injection 40 mg, Daily    And  sodium chloride (PF) 0.9 % injection 10 mL, Daily  vancomycin 1000 mg IVPB in 250 mL D5W addavial, Once  [START ON 11/21/2020] ipratropium-albuterol (DUONEB) nebulizer solution 1 ampule, Q4H WA  hydrocortisone sodium succinate PF (SOLU-CORTEF) 50 mg in dextrose 5 % 50 mL IVPB, 3 times per day  doxycycline (VIBRAMYCIN) 100 mg in dextrose 5 % 100 mL IVPB, Q12H        Allergies   Patient has no known allergies. Social History     Social History     Tobacco History     Smoking Status  Former Smoker Smoking Frequency  For 25 years    Smokeless Tobacco Use  Never Used          Alcohol History     Alcohol Use Status  No          Drug Use     Drug Use Status  No          Sexual Activity     Sexually Active  Never                Family History     Family History   Family history unknown: Yes       Review of Systems   Review of Systems cannot be obtained secondary to the patient's confusion and near obtundation. Physical Exam   /66   Pulse 92   Temp 99.1 °F (37.3 °C) (Bladder)   Resp 25   Wt 157 lb (71.2 kg)   SpO2 100%   BMI 24.59 kg/m²      Physical Exam reveals a confused elderly gentleman in no acute distress  Skin-markedly decreased turgor  HEENT-mucous membranes dry  Neck-no JVD, lymphadenopathy, thyromegaly  Chest-inspiratory crackles over both posterior lung fields, few soft expiratory wheezes bilaterally  Heart-S1-S2 regular. No S3-S4 murmurs or rubs are present  Abdomen-soft, flat, nontender  Extremities-no clubbing, cyanosis, or edema  Neuro-patient is confused and slightly obtunded. He does open his eyes but does not respond with words.   He does move extremities but without obvious purpose  Psychiatric-cannot evaluate    Labs    CBC:  Recent Labs     11/19/20 1855 11/20/20  0237   WBC 24.7* 39.7*   RBC 3.69* 3.09*   HGB 11.6* 9.5*   HCT 39.1 31.0*   .0* 100.3*   RDW 15.2* 15.0    205     CHEMISTRIES:  Recent Labs     11/19/20  1855  11/20/20  0237 11/20/20  0701 11/20/20  1007 11/20/20  1156 11/20/20  1218   *   < > 157* 157* 155* 154*  --    K 4.8   < > 4.3 4.4 4.3 4.2  --    *   < > 126* 127* 125* 123*  --    CO2 24   < > 24 24 25 24  --    BUN 63*   < > 54* 51* 49* 47*  --    CREATININE 2.6*   < > 2.3* 2.1* 2.0* 1.9*  --    GLUCOSE 188*   < > 189* 165* 154* 153* 135   PHOS  --   --  3.4 --   --   --   --    MG 2.4  --  2.1  --   --   --   --     < > = values in this interval not displayed. PT/INR:  Recent Labs     11/19/20 1855   PROTIME 15.9*   INR 1.4     APTT:  Recent Labs     11/19/20 1855   APTT 21.9*     LIVER PROFILE:  Recent Labs     11/19/20 1855 11/20/20  0237   * 274*   * 174*   BILIDIR  --  0.4*   BILITOT 0.6 0.6   ALKPHOS 393* 292*       Imaging/Diagnostics   Ct Abdomen Pelvis Wo Contrast Additional Contrast? None    Result Date: 11/19/2020  No evidence of acute intra-abdominal process, status post cholecystectomy. Sigmoid diverticulosis. Infiltrates at the visualized lung bases. Ct Head Wo Contrast    Result Date: 11/19/2020  No acute intracranial abnormality. Xr Chest Portable    Result Date: 11/19/2020  Increased bilateral lung opacities are suspicious for multifocal pneumonia. Assessment    1. Profound dehydration  2. TUNG  3. Metabolic acidosis  4. Pneumonia  5. Confusion probably secondary to dehydration and azotemia  6. History of sigmoid diverticulosis  7. Probable underlying COPD  8. Hypernatremia  9. Septic shock  Hospital Problems           Last Modified POA    * (Principal) Septic shock ([facility] [address] 11/20/2020 Yes          Plan   1. IV fluids with 0.45 normal saline for now  2. IV antibiotics adjusted for renal failure  3. Aerosolized bronchodilators  4. Levophed for septic shock  5. Hydrocortisone for septic shock  6. Heparin for DVT prophylaxis  7. Ultrasound of legs for DVT  8. Labs in a.m. including CBC, chemistries, ABGs  9 repeat chest x-ray in a.m. to see if his infiltrates have enlarged with rehydration  10.   IV Protonix for GI bleed prophylaxis      Thank you for the opportunity participate in the care of this interesting and challenging gentleman  Electronically signed by Мария Ferrari MD on 11/20/20 at 8:17 PM EST

## 2020-11-21 NOTE — CONSULTS
Nephrology Progress Note  Patient's Name: Ashwin Anaya    Nephrologist: Leonora Gleason    Reason for Consult:  Hypernatremia and TUNG  Requesting Physician:  Mono Haq MD    Chief Complaint:  syncope    History Obtained From:  past medical records    History of Present Ilness:    Ashwin Anaya is a 80 y.o. male with prior history of dementia, copd, depression, htn, CKD G3A . Baseline serum cr 1.3mg/dl with an e-GFR=52ml/min who presented last night from a nursing home for syncope. He was hypotensive. He was found to be in septic shock with possible pneumonia and uti. He was started on doxy and vanco. He has been started on ivf and solucortef. Labs show na 159>154, co2 24, bun 47, cr 2.1>1.9, ca 7.5, alb 2.5, hgb 9.5, plt 205, fena <1. Ct shows no hydro. 11/21/20: Pt awake alert oriented to self, offers no new complaints    Past Medical History:   Diagnosis Date    Chronic kidney disease     COPD (chronic obstructive pulmonary disease) ([address]     Dementia ([facility] [address]     Depression     Hypertension     Neuromuscular disorder ([address]     Other disorders of kidney and ureter in diseases classified elsewhere     Pneumonia     Psychosis ([address]        Past Surgical History:   Procedure Laterality Date    ABDOMEN SURGERY      APPENDECTOMY      CHOLECYSTECTOMY      COLONOSCOPY      ENDOSCOPY, COLON, DIAGNOSTIC      KIDNEY REMOVAL      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 He quit after 25.00 years of use. He has never used smokeless tobacco. He reports that he does not drink alcohol or use drugs. Allergies:  Patient has no known allergies.     Current Medications:    aspirin EC tablet 81 mg, Daily  sodium chloride flush 0.9 % injection 10 mL, 2 times per day  sodium chloride flush 0.9 % injection 10 mL, PRN  acetaminophen (TYLENOL) tablet 650 mg, Q6H PRN    Or  acetaminophen (TYLENOL) Labs:  CBC:   Lab Results   Component Value Date    WBC 39.7 11/20/2020    RBC 3.09 11/20/2020    HGB 9.5 11/20/2020    HCT 31.0 11/20/2020    .3 11/20/2020    MCH 30.7 11/20/2020    MCHC 30.6 11/20/2020    RDW 15.0 11/20/2020     11/20/2020    MPV 11.6 11/20/2020     CBC with Differential:    Lab Results   Component Value Date    WBC 39.7 11/20/2020    RBC 3.09 11/20/2020    HGB 9.5 11/20/2020    HCT 31.0 11/20/2020     11/20/2020    .3 11/20/2020    MCH 30.7 11/20/2020    MCHC 30.6 11/20/2020    RDW 15.0 11/20/2020    SEGSPCT 53 10/12/2013    METASPCT 2 10/12/2013    LYMPHOPCT 3.4 11/20/2020    MONOPCT 5.5 11/20/2020    MYELOPCT 0.8 11/20/2020    EOSPCT 8.7 10/06/2020    BASOPCT 0.3 11/20/2020    MONOSABS 0.00 11/20/2020    LYMPHSABS 1.19 11/20/2020    EOSABS 0.00 11/20/2020    BASOSABS 0.00 11/20/2020     Hemoglobin/Hematocrit:    Lab Results   Component Value Date    HGB 9.5 11/20/2020    HCT 31.0 11/20/2020     CMP:    Lab Results   Component Value Date     11/20/2020    K 4.2 11/20/2020    K 5.0 04/14/2019     11/20/2020    CO2 24 11/20/2020    BUN 47 11/20/2020    CREATININE 1.9 11/20/2020    GFRAA 41 11/20/2020    LABGLOM 33 11/20/2020    GLUCOSE 135 11/20/2020    PROT 5.7 11/20/2020    LABALBU 2.5 11/20/2020    CALCIUM 7.5 11/20/2020    BILITOT 0.6 11/20/2020    ALKPHOS 292 11/20/2020     11/20/2020     11/20/2020     BMP:    Lab Results   Component Value Date     11/20/2020    K 4.2 11/20/2020    K 5.0 04/14/2019     11/20/2020    CO2 24 11/20/2020    BUN 47 11/20/2020    LABALBU 2.5 11/20/2020    CREATININE 1.9 11/20/2020    CALCIUM 7.5 11/20/2020    GFRAA 41 11/20/2020    LABGLOM 33 11/20/2020    GLUCOSE 135 11/20/2020     Sodium:    Lab Results   Component Value Date     11/20/2020     BUN/Creatinine:    Lab Results   Component Value Date    BUN 47 11/20/2020    CREATININE 1.9 11/20/2020     Hepatic Function Panel:    Lab Results Component Value Date    ALKPHOS 292 11/20/2020     11/20/2020     11/20/2020    PROT 5.7 11/20/2020    BILITOT 0.6 11/20/2020    BILIDIR 0.4 11/20/2020    IBILI 0.2 11/20/2020    LABALBU 2.5 11/20/2020     Calcium:    Lab Results   Component Value Date    CALCIUM 7.5 11/20/2020     Ionized Calcium:  No results found for: IONCA  Magnesium:    Lab Results   Component Value Date    MG 2.1 11/20/2020     Phosphorus:    Lab Results   Component Value Date    PHOS 3.4 11/20/2020     LDH:  No results found for: LDH  Uric Acid:  No results found for: LABURIC, URICACID  PT/INR:    Lab Results   Component Value Date    PROTIME 15.9 11/19/2020    INR 1.4 11/19/2020     PTT:    Lab Results   Component Value Date    APTT 21.9 11/19/2020   [APTT}  Troponin:    Lab Results   Component Value Date    TROPONINI 0.21 11/20/2020     U/A:    Lab Results   Component Value Date    COLORU DIANNA 11/19/2020    PROTEINU >=300 11/19/2020    PHUR 8.5 11/19/2020    WBCUA 0-1 11/19/2020    RBCUA >20 11/19/2020    RBCUA PACKED 10/08/2013    BACTERIA RARE 11/19/2020    CLARITYU CLOUDY 11/19/2020    SPECGRAV 1.010 11/19/2020    LEUKOCYTESUR SMALL 11/19/2020    UROBILINOGEN 2.0 11/19/2020    BILIRUBINUR SMALL 11/19/2020    BILIRUBINUR Negative 11/09/2020    BLOODU LARGE 11/19/2020    GLUCOSEU Negative 11/19/2020    AMORPHOUS MODERATE 11/19/2020     ABG:    Lab Results   Component Value Date    PH 7.356 11/20/2020    PCO2 38.4 11/20/2020    PO2 178.2 11/20/2020    HCO3 21.0 11/20/2020    BE -4.1 11/20/2020    O2SAT 98.5 11/20/2020     HgBA1c:    Lab Results   Component Value Date    LABA1C 6.0 11/20/2020     Microalbumen/Creatinine ratio:  No components found for: RUCREAT  FLP:    Lab Results   Component Value Date    TRIG 111 03/25/2019    HDL 30 03/25/2019    LDLCALC 64 03/25/2019    LABVLDL 22 03/25/2019     TSH:    Lab Results   Component Value Date    TSH 2.520 11/20/2020     VITAMIN B12: No components found for: B12  FOLATE:  No results found for: FOLATE  Iron Saturation:  No components found for: PERCENTFE  FERRITIN:  No results found for: FERRITIN  AMYLASE:  No results found for: AMYLASE  LIPASE:  No results found for: LIPASE  24 Hour Urine for Protein:  No components found for: [employer], TIOS91TL, UTV3  24 Hour Urine for Creatinine Clearance:  No components found for: CREAT4, UHRS10, UTV10  PSA:   Lab Results   Component Value Date    PSA 1.38 03/24/2019 11/20/20 2 D ECHO:   Summary    Left ventricular size is grossly normal.    Mild left ventricular concentric hypertrophy noted.    Ejection fraction is measured at 82%.    No evidence of left ventricular mass or thrombus noted.    No regional wall motion abnormalities seen.    Physiologic and/or trace mitral regurgitation is present.    No evidence of mitral valve stenosis.    Aortic valve opens well.    The aortic valve is trileaflet.    No hemodynamically significant aortic stenosis is present.    Physiologic and/or trace aortic regurgitation is noted.    Pulmonary hypertension is mild .    Physiologic and/or trace tricuspid regurgitation.    Regular rhythm. Imaging:  CXR results:  EXAMINATION:    ONE XRAY VIEW OF THE CHEST    11/19/2020 7:19 pm    COMPARISON:    June 7, 2016    HISTORY:    ORDERING SYSTEM PROVIDED HISTORY: Shortness of breath    TECHNOLOGIST PROVIDED HISTORY:    Reason for exam:->Shortness of breath    FINDINGS:    Increased bilateral lung opacities.  Cardiomegaly.  Atherosclerotic    calcification of the thoracic aorta.         Impression    Increased bilateral lung opacities are suspicious for multifocal pneumonia. EXAMINATION:    CT OF THE ABDOMEN AND PELVIS WITHOUT CONTRAST 11/19/2020 8:30 pm    TECHNIQUE:    CT of the abdomen and pelvis was performed without the administration of    intravenous contrast. Multiplanar reformatted images are provided for review.     Dose modulation, iterative reconstruction, and/or weight based adjustment of the mA/kV was utilized to reduce the radiation dose to as low as reasonably    achievable. COMPARISON:    None. HISTORY:    ORDERING SYSTEM PROVIDED HISTORY: septic concern for intraabdominal pathology    TECHNOLOGIST PROVIDED HISTORY:    Reason for exam:->septic concern for intraabdominal pathology    Additional Contrast?->None    FINDINGS:    Lower Chest: Extensive interstitial markings and patchy opacities at the    visualized lung bases.  No large pleural effusions.  The heart is mildly    enlarged with no pericardial effusion. Organs: The liver spleen, pancreas and adrenal glands appear unremarkable on    this non IV contrast scan.  The kidneys appear slightly atrophic with no    evidence of renal stones or hydronephrosis.  The gallbladder is surgically    absent. GI/Bowel: No evidence of obstructing or constricting mass lesions. Edilma Autumn is    sigmoid diverticulosis with no evidence of diverticulitis. Pelvis: The bladder is collapsed with Rdz catheter in place there are    pockets of gas in the bladder which may have been introduced during    catheterization.  No significant adenopathy or ascites noted. Peritoneum/Retroperitoneum: No significant ascites or adenopathy.  No free    air.  Abdominal aorta shows atherosclerotic calcification with no evidence of    abdominal aortic aneurysm.  No periaortic fluid collection. Bones/Soft Tissues: No lytic or blastic bony lesions.  Degenerative changes    in the spine.         Impression    No evidence of acute intra-abdominal process, status post cholecystectomy. Sigmoid diverticulosis. Infiltrates at the visualized lung bases. Assessment  1.  Stage I TUNG on ckd 3a  Cr 1/2020 was 1.3 with gfr of 52ml/min  Presumed sec to decreased effective renal perfusion in the setting of the sepsis with hypotension and the free water deficit  No hydro on mayra  UA Bili small Blood large, protein>300mg/dl, small LE,  fena <1  Cr trending

## 2020-11-21 NOTE — CONSULTS
[facility]  Consult Note    [address] [address]  [place], [address]  Phone [phone]   [phone]      Admit Date: 2020  6:24 PM  Pt Name: Cristina Lewis  MRN: [id number]  : 1931  Reason for Consult:    Chief Complaint   Patient presents with    Loss of Consciousness     Syncope without colapse on toilet. Pt A&O x0 @ baseline. hematuria - was treated with levequin for UTI. New ABX for UTI was to start today. Poss change in mentation? Requesting Physician:  Alena Weaver DO  PCP: Marie Zheng MD  History Obtained From:  patient  ID consulted for Septic shock ([facility] [A41.9, R65.21]  on hospital day [address]       Chief Complaint   Patient presents with    Loss of Consciousness     Syncope without colapse on toilet. Pt A&O x0 @ baseline. hematuria - was treated with levequin for UTI. New ABX for UTI was to start today. Poss change in mentation? HISTORYOF PRESENT ILLNESS      Cristina Lewis is a 80 y.o. male who presents with significant past medical history of  has a past medical history of Chronic kidney disease, COPD (chronic obstructive pulmonary disease) ([address] Dementia ([address] Depression, Hypertension, Neuromuscular disorder ([address] Other disorders of kidney and ureter in diseases classified elsewhere, Pneumonia, and Psychosis ([address] who presents with   Chief Complaint   Patient presents with    Loss of Consciousness     Syncope without colapse on toilet. Pt A&O x0 @ baseline. hematuria - was treated with levequin for UTI. New ABX for UTI was to start today. Poss change in mentation? ED TRIAGEVITALS  BP: (!) 85/41, Temp: 99 °F (37.2 °C), Pulse: 97, Resp: 18, SpO2: 96 %  HPI  Pt from [facility] with syncopal episode  resp viral panel neg   ua le/blood/cloudy /wbc  [id number] ct a/p No evidence of acute intra-abdominal process, status post cholecystectomy. Sigmoid diverticulosis.    Infiltrates at the visualized every 6 hours as needed (nausea/vomiting)   mineral oil-hydrophilic petrolatum (AQUAPHOR) ointment, Apply 1 Container topically as needed for Dry Skin   polyvinyl alcohol (ARTIFICIAL TEARS) 1.4 % ophthalmic solution, Place 1 drop into both eyes as needed for Dry Eyes  bisacodyl (BISAC-EVAC) 10 MG suppository, Place 10 mg rectally daily as needed for Constipation  ASPIRIN LOW DOSE 81 MG EC tablet, Take 1 tablet by mouth daily  OXYGEN, 2L NC; Titrate to SpO2 92-95%.  (Patient taking differently: Inhale 2-4 L into the lungs continuous Maintain p/o over 90% every shift)  magnesium hydroxide (MILK OF MAGNESIA CONCENTRATE) 2400 MG/10ML SUSP, Take 30 mLs by mouth daily as needed (Constipation) '  CURRENT MEDICATIONS     Current Facility-Administered Medications:     norepinephrine (LEVOPHED) 16 mg in dextrose 5 % 250 mL infusion, 5 mcg/min, Intravenous, Continuous, Wilfredo Gillespie MD, Last Rate: 1.9 mL/hr at 11/21/20 1055, 2 mcg/min at 11/21/20 1055    hydrocortisone sodium succinate PF (SOLU-CORTEF) injection 50 mg, 50 mg, Intravenous, Q8H, Lv Prasad MD, 50 mg at 11/21/20 1614    vancomycin 1000 mg IVPB in 250 mL D5W addavial, 1,000 mg, Intravenous, Once, Ismail U Dionna, DO    aspirin EC tablet 81 mg, 81 mg, Oral, Daily, Ismail U Dionna, DO, 81 mg at 11/21/20 1238    sodium chloride flush 0.9 % injection 10 mL, 10 mL, Intravenous, 2 times per day, Carlus Bodo, DO, 10 mL at 11/21/20 0814    sodium chloride flush 0.9 % injection 10 mL, 10 mL, Intravenous, PRN, Carlus Bodo, DO    acetaminophen (TYLENOL) tablet 650 mg, 650 mg, Oral, Q6H PRN **OR** acetaminophen (TYLENOL) suppository 650 mg, 650 mg, Rectal, Q6H PRN, Carlus Bodo, DO    polyethylene glycol (GLYCOLAX) packet 17 g, 17 g, Oral, Daily PRN, Carlus Bodo, DO    glucose (GLUTOSE) 40 % oral gel 15 g, 15 g, Oral, PRN, Ismail U Dionna, DO    dextrose 50 % IV solution, 12.5 g, Intravenous, PRN, Lawrence Yu DO    glucagon (rDNA) injection 1 mg, 1 mg, Intramuscular, PRN, Johanna Idol, DO    dextrose 5 % solution, 100 mL/hr, Intravenous, PRN, Johanna Idol, DO    heparin (porcine) injection 5,000 Units, 5,000 Units, Subcutaneous, 3 times per day, Johanna Idol, DO, 5,000 Units at 11/21/20 1430    cefepime (MAXIPIME) 2 g IVPB extended (mini-bag), 2 g, Intravenous, Q24H, Stopped at 11/21/20 0628 **AND** 0.9 % sodium chloride infusion, , Intravenous, Q24H, Johanna Millardl, DO, Stopped at 11/21/20 1114    vancomycin (VANCOCIN) intermittent dosing (placeholder), , Other, RX Placeholder, Johanna Idol, DO    0.45 % sodium chloride infusion, , Intravenous, Continuous, Nelly Ko MD, Last Rate: 150 mL/hr at 11/21/20 1222    perflutren lipid microspheres (DEFINITY) injection 1.65 mg, 1.5 mL, Intravenous, ONCE PRN, Mir Valiente DO    pantoprazole (PROTONIX) injection 40 mg, 40 mg, Intravenous, Daily, 40 mg at 11/21/20 0814 **AND** sodium chloride (PF) 0.9 % injection 10 mL, 10 mL, Intravenous, Daily, Chris Valiente DO, 10 mL at 11/21/20 0817    ipratropium-albuterol (DUONEB) nebulizer solution 1 ampule, 1 ampule, Inhalation, Q4H WA, Shadi Acharya MD, 1 ampule at 11/21/20 1455    doxycycline (VIBRAMYCIN) 100 mg in dextrose 5 % 100 mL IVPB, 100 mg, Intravenous, Q12H, Johanna Millardl, DO, Stopped at 11/21/20 1324  ALLERGIES     Patient has no known allergies.   Immunization History   Administered Date(s) Administered    Influenza Virus Vaccine 10/09/2013, 10/07/2014, 10/14/2015, 10/03/2016, 10/13/2017, 11/06/2018    Influenza, Quadv, adjuvanted, 65 yrs +, IM, PF (Fluad) 10/30/2020    Influenza, Triv, inactivated, subunit, adjuvanted, IM (Fluad 65 yrs and older) 10/07/2019    Pneumococcal Conjugate 13-valent (Pollyann Hai) 12/02/2019    Pneumococcal Polysaccharide ([id number]) 09/03/2013     PAST MEDICAL HISTORY     Past Medical History:   Diagnosis Date    Chronic kidney disease     COPD [facility]ch[facility] obstructive [facility] disease) ([facility]     Dementia ([facility]     Depression     Hypertension     Neurom[facility] ([facility]     Other disorders of kidney and ureter in diseases classified elsewhere     Pneumonia     Psychosis ([facility]      SURGICAL HISTORY       Past Surgical History:   Procedure Laterality Date    ABDOMEN SURGERY      APPENDECTOMY      CHOLECYSTECTOMY      COLONOSCOPY      ENDOSCOPY, COLON, DIAGNOSTIC      KIDNEY REMOVAL      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    TONSILLECTOMY       FAMILY HISTORY       Family History   Family history unknown: Yes     SOCIAL HISTORY       Social History     Socioeconomic History    Marital status:       Spouse name: None    Number of children: None    Years of education: None    Highest education level: None   Occupational History    None   Social Needs    Financial resource strain: None    Food insecurity     Worry: None     Inability: None    Transportation needs     Medical: None     Non-medical: None   Tobacco Use    Smoking status: Former Smoker     Years: 25.00    Smokeless tobacco: Never Used   Substance and Sexual Activity    Alcohol use: No    Drug use: No    Sexual activity: Never   Lifestyle    Physical activity     Days per week: None     Minutes per session: None    Stress: None   Relationships    Social connections     Talks on phone: None     Gets together: None     Attends Baptism service: None     Active member of club or organization: None     Attends meetings of clubs or organizations: None     Relationship status: None    Intimate partner violence     Fear of current or ex partner: None     Emotionally abused: None     Physically abused: None     Forced sexual activity: None   Other Topics Concern    None   Social History Narrative    None     ·      PHYSICAL EXAM    (up to 7 for level 4, 8 or more forlevel 5)     ED Triage Vitals   BP Temp Temp Source Pulse Resp SpO2 Height Weight   11/19/20 1834 11/19/20 1835 11/19/20 1835 11/19/20 1833 11/19/20 1833 11/19/20 1833 11/21/20 1330 11/19/20 1926   (!) 83/56 101.5 °F (38.6 °C) Oral 138 (!) 33 97 % 5' 7\" (1.702 m) 157 lb (71.2 kg)     Vitals:    Vitals:    11/21/20 1415 11/21/20 1430 11/21/20 1500 11/21/20 1600   BP: (!) 96/52 (!) 97/48 (!) 97/51 (!) 85/41   Pulse: 108 83 82 97   Resp: 26 17 16 18   Temp:    99 °F (37.2 °C)   TempSrc:    Core   SpO2: 98% 98% 100% 96%   Weight:       Height:         Physical Exam   Constitutional/General: Awake does not answer questions   Head: NC/AT  Eyes: PERRL, EOMI  Mouth: Normal mucosa, no thrush     Pulmonary: Lungs dec  to auscultation bilaterally. Not in respiratory distress  Cardiovascular:  Regular rate and rhythm, no murmurs, gallops, or rubs. Abdomen: Soft, + BS.   distension. Nontender. Extremities: Moves all extremities x 4. Edema  Warm and well perfused  Pulses:  Distal pulses dec   Skin: Warm and dry without rash  Neurologic:    No focal deficits  Psych: Normal Affect  Left fem tlc 11/19     DIAGNOSTICRESULTS   RADIOLOGY:   Ct Abdomen Pelvis Wo Contrast Additional Contrast? None    Result Date: 11/19/2020  EXAMINATION: CT OF THE ABDOMEN AND PELVIS WITHOUT CONTRAST 11/19/2020 8:30 pm TECHNIQUE: CT of the abdomen and pelvis was performed without the administration of intravenous contrast. Multiplanar reformatted images are provided for review. Dose modulation, iterative reconstruction, and/or weight based adjustment of the mA/kV was utilized to reduce the radiation dose to as low as reasonably achievable. COMPARISON: None. HISTORY: ORDERING SYSTEM PROVIDED HISTORY: septic concern for intraabdominal pathology TECHNOLOGIST PROVIDED HISTORY: Reason for exam:->septic concern for intraabdominal pathology Additional Contrast?->None FINDINGS: Lower Chest: Extensive interstitial markings and patchy opacities at the visualized lung bases. No large pleural effusions. The heart is mildly enlarged with no pericardial effusion. Organs:  The liver spleen, pancreas and adrenal glands appear unremarkable on this non IV contrast scan. The kidneys appear slightly atrophic with no evidence of renal stones or hydronephrosis. The gallbladder is surgically absent. GI/Bowel: No evidence of obstructing or constricting mass lesions. There is sigmoid diverticulosis with no evidence of diverticulitis. Pelvis: The bladder is collapsed with Rdz catheter in place there are pockets of gas in the bladder which may have been introduced during catheterization. No significant adenopathy or ascites noted. Peritoneum/Retroperitoneum: No significant ascites or adenopathy. No free air. Abdominal aorta shows atherosclerotic calcification with no evidence of abdominal aortic aneurysm. No periaortic fluid collection. Bones/Soft Tissues: No lytic or blastic bony lesions. Degenerative changes in the spine. No evidence of acute intra-abdominal process, status post cholecystectomy. Sigmoid diverticulosis. Infiltrates at the visualized lung bases. Xr Chest (2 Vw)    1. There is a left base infiltrate. Ct Head Wo Contrast    Result Date: 11/19/2020  EXAMINATION: CT OF THE HEAD WITHOUT CONTRAST  11/19/2020 9:32 pm TECHNIQUE: CT of the head was performed without the administration of intravenous contrast. Dose modulation, iterative reconstruction, and/or weight based adjustment of the mA/kV was utilized to reduce the radiation dose to as low as reasonably achievable. COMPARISON: April 14, 2019 HISTORY: ORDERING SYSTEM PROVIDED HISTORY: hypenatremia TECHNOLOGIST PROVIDED HISTORY: Reason for exam:->hypenatremia Has a \"code stroke\" or \"stroke alert\" been called? ->No FINDINGS: BRAIN/VENTRICLES: Stable cortical atrophy noted, likely age related. Stable periventricular white matter ischemic changes noted. There is no acute intracranial hemorrhage, mass effect or midline shift. No abnormal extra-axial fluid collection.   The gray-white differentiation is maintained without evidence of an acute infarct. There is no evidence of hydrocephalus. ORBITS: The visualized portion of the orbits demonstrate no acute abnormality. SINUSES: The visualized paranasal sinuses and mastoid air cells demonstrate no acute abnormality. SOFT TISSUES/SKULL:  No acute abnormality of the visualized skull or soft tissues. No acute intracranial abnormality. Xr Chest Portable    Result Date: 11/21/2020  EXAMINATION: ONE XRAY VIEW OF THE CHEST 11/21/2020 6:51 am COMPARISON: November 21, 2020 HISTORY: ORDERING SYSTEM PROVIDED HISTORY: SOB TECHNOLOGIST PROVIDED HISTORY: Reason for exam:->SOB FINDINGS: Improving but persistent multifocal bilateral interstitial and airspace disease. Stable heart size and mediastinal contours. Improving but persistent multifocal pneumonia. Xr Chest Portable    Result Date: 11/21/2020  EXAMINATION: ONE XRAY VIEW OF THE CHEST 11/21/2020 12:12 am COMPARISON: 11/19/2020 HISTORY: ORDERING SYSTEM PROVIDED HISTORY: SOB TECHNOLOGIST PROVIDED HISTORY: Reason for exam:->SOB FINDINGS: Bilateral infiltrates are unchanged. There is no definite pleural effusion. There is no pneumothorax. There are atherosclerotic calcifications in the aortic arch. There is no significant cardiomegaly. Unchanged diffuse pulmonary infiltrates. Xr Chest Portable    Result Date: 11/19/2020  EXAMINATION: ONE XRAY VIEW OF THE CHEST 11/19/2020 7:19 pm COMPARISON: June 7, 2016 HISTORY: ORDERING SYSTEM PROVIDED HISTORY: Shortness of breath TECHNOLOGIST PROVIDED HISTORY: Reason for exam:->Shortness of breath FINDINGS: Increased bilateral lung opacities. Cardiomegaly. Atherosclerotic calcification of the thoracic aorta. Increased bilateral lung opacities are suspicious for multifocal pneumonia.     LABS  Recent Labs     11/19/20  1855 11/20/20  0237 11/21/20  0531   WBC 24.7* 39.7* 16.3*   HGB 11.6* 9.5* 9.3*   HCT 39.1 31.0* 30.7*   .0* 100.3* 102.3*    205 181     Recent Labs 11/19/20 1855 11/20/20  0237  11/20/20  1007 11/20/20  1156 11/20/20  1218 11/21/20  0236 11/21/20  0531   *   < > 157*   < > 155* 154*  --   --  150*   K 4.8   < > 4.3   < > 4.3 4.2  --   --  3.9   *   < > 126*   < > 125* 123*  --   --  118*   CO2 24   < > 24   < > 25 24  --   --  23   BUN 63*   < > 54*   < > 49* 47*  --   --  41*   CREATININE 2.6*   < > 2.3*   < > 2.0* 1.9*  --   --  1.5*   GFRAA 28   < > 32   < > 38 41  --   --  53   LABGLOM 23   < > 27   < > 32 33  --   --  44   GLUCOSE 188*   < > 189*   < > 154* 153* 135 121 128*   PROT 7.1  --  5.7*  --   --   --   --   --  5.5*   LABALBU 3.1*  --  2.5*  --   --   --   --   --  2.4*   CALCIUM 8.4*   < > 7.3*   < > 7.5* 7.5*  --   --  7.4*   BILITOT 0.6  --  0.6  --   --   --   --   --  0.4   ALKPHOS 393*  --  292*  --   --   --   --   --  217*   *  --  274*  --   --   --   --   --  64*   *  --  174*  --   --   --   --   --  95*    < > = values in this interval not displayed. No results for input(s): PROCAL in the last 72 hours. No results found for: CRP  Lab Results   Component Value Date    SEDRATE 25 (H) 03/25/2019     No results found for: [id number]  Lab Results   Component Value Date    COVID19 Not Detected 11/19/2020     COVID-19/LUZ-COV2 LABS  Recent Labs     11/19/20 1855 11/20/20  0237 11/20/20  0701 11/20/20  1156 11/21/20  0531   TROPONINI  --    < > 0.29* 0.24* 0.21* 0.16*   INR 1.4  --   --   --   --   --    PROTIME 15.9*  --   --   --   --   --    *  --  274*  --   --  64*   *  --  174*  --   --  95*    < > = values in this interval not displayed.      Lab Results   Component Value Date    CHOL 116 03/25/2019    TRIG 111 03/25/2019    HDL 30 03/25/2019    LDLCALC 64 03/25/2019    LABVLDL 22 03/25/2019         Lab Results   Component Value Date    HEPAIGM NON REACT 09/03/2013    HEPBIGM NON REACT 09/03/2013    HEPCAB NON REACT 09/03/2013     No results found for: HCVABI  No results found for: HCVQNTNAAT  No results found for: HCVQNTNAATLG    MICROBIOLOGY:     Cultures :   Lab Results   Component Value Date    BC 24 Hours no growth 11/19/2020    BC 5 Days- no growth 06/07/2016    BC 5 Days- no growth 06/07/2016    ORG Gram negative leticia 11/19/2020    ORG Escherichia coli 04/15/2019    ORG FILM ARR Respiratory syncitial virus Detected 06/07/2016     Lab Results   Component Value Date    BLOODCULT2  11/19/2020     Gram stain performed from blood culture bottle media  Gram negative rods      BLOODCULT2 Identification and sensitivity to follow 11/19/2020    BLOODCULT2 5 Days- no growth 06/07/2016    ORG Gram negative lteicia 11/19/2020    ORG Escherichia coli 04/15/2019    ORG FILM ARR Respiratory syncitial virus Detected 06/07/2016          Urine Culture, Routine   Date Value Ref Range Status   11/19/2020   Final    10 to 100,000 CFU/mL  Mixed julianne isolated. Further workup and sensitivity testing  is not routinely indicated and will not be performed. Mixed julianne isolated includes:  Mixed gram positive organisms  Gram positive cocci  Corynebacterium species     04/15/2019 >100,000 CFU/ml  Final   06/08/2016 Growth not present  Final     No results found for: [address] [place] [address]  No results for input(s): CDIFFTOXANT in the last 72 hours. Recent Labs     11/20/20  0237   LP1UAG Presumptive Negative -suggesting no recent or current infections  with Legionella pneumophila serogroup 1. Infection to Legionella cannot be ruled out since other serogroups  and species may cause infection, antigen may not be present in  early infection, or level of antigen may be below the  detection limit. Normal Range: Presumptive Negative       Recent Labs     11/20/20  0237   STREPNEUMAGU Urine specimen POSITIVE for Pneumococcal pneumonia. Normal Range: Presumptive Negative         Patient is a 80 y.o. male who presented with   Chief Complaint   Patient presents with    Loss of Consciousness     Syncope without colapse on toilet.  Pt A&O x0 @ baseline. hematuria - was treated with levequin for UTI. New ABX for UTI was to start today. Poss change in mentation? FINAL IMPRESSION      1. Septic shock (HCC) leukocytosis gn sepsis prob due to uti    2. Urinary tract infection with hematuria, site unspecified santhosh better   3. Pneumonia due to  S pneumonia    hypernatremia     cefepime (MAXIPIME) 2 g IVPB extended (mini-bag), Q24H    vancomycin (VANCOCIN) intermittent dosing (placeholder), RX Placeholder     doxycycline (VIBRAMYCIN) 100 mg in dextrose 5 % 100 mL IVPB, Q12H    Repeat blood cx  cont atbx      Imaging and labs were reviewed per medical records and any ID pertinent labs were addressed with the patient. The patient/FAMILY  was educated about the diagnosis, prognosis, indications, risks and benefits of treatment. An opportunity to ask questions was given to the patient/FAMILY and questions were answered. Thank you for involving me in the care of [employer]. Please do not hesitate to call for any questions or concerns.          Electronically signed by Ignacio Barlow MD on 11/21/2020 at 4:50 PM

## 2020-11-22 NOTE — PROGRESS NOTES
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 ([address]    History of falling    Enlarged prostate without lower urinary tract symptoms (luts)    Other constipation    Unspecified chronic bronchitis ([address]    History of recurrent pneumonia    Tinea unguium    Pain in left toe(s)    Pain in toe of right foot    Difficulty walking    Moderate chronic obstructive pulmonary disease ([address]    Chronic kidney disease    Wheezing    Dementia ([address]    Septic shock ([address]        Tentative placement recommendation: [facility] San with Physical Therapy     Equipment recommendation: Equipment at Nursing Home      Prior Level of Function: unknown  Rehab Potential: fair  for baseline    Past medical history:   Past Medical History:   Diagnosis Date    Chronic kidney disease     COPD (chronic obstructive pulmonary disease) ([address]     Dementia ([address]     Depression     Hypertension     Neuromuscular disorder ([address]     Other disorders of kidney and ureter in diseases classified elsewhere     Pneumonia     Psychosis ([address]      Past Surgical History:   Procedure Laterality Date    ABDOMEN SURGERY      APPENDECTOMY      CHOLECYSTECTOMY      COLONOSCOPY      ENDOSCOPY, COLON, DIAGNOSTIC      KIDNEY REMOVAL      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  Mobility Inpatient   How much difficulty turning over in bed?: A Lot  How much difficulty sitting down on / standing up from a chair with arms?: Unable  How much difficulty moving from lying on back to sitting on side of bed?: A Lot  How much help from another person moving to and from a bed to a chair?: Total  How much help from another person needed to walk in hospital room?: Total  How much help from another person for climbing 3-5 steps with a railing?: Total  AM-PAC Inpatient Mobility Raw Score : 8  AM-PAC Inpatient T-Scale Score : 28.52  Mobility Inpatient CMS 0-100% Score: 86.62  Mobility Inpatient CMS G-Code Modifier : CM    Nursing cleared patient for PT evaluation. The admitting diagnosis and active problem list as listed above have been reviewed prior to the initiation of this evaluation. OBJECTIVE;   Initial Evaluation  Date: 11/22/20 Treatment Date:     Short Term/ Long Term   Goals   Was pt agreeable to Eval/treatment? Yes    To be met in 3 days   Pain level   0/10        Bed Mobility    Rolling: Moderate assist of 1    Supine to sit: Maximal assist of 1    Sit to supine: Maximal assist of 1    Scooting: Maximal assist of 1    Rolling: Moderate assist of 1    Supine to sit: Moderate assist of 1    Sit to supine: Moderate assist of 1    Scooting: Moderate assist of 1     Transfers Sit to stand: Not assessed     Sit to stand:  Moderate assist of  2     ROM impaired bilateral shoulders and bilateral lower extremities     Increase range of motion 10% of affected joints    Strength BUE:  2/5  RLE:  2/5  LLE:  2/5   Increase strength in affected mm groups by 1/3 grade   Balance Sitting EOB:  poor       Sitting EOB:  fair          Patient is Alert & Oriented x person and follows one step directions with increased time and repettion  Sensation:  Patient  denies numbness and tingling     Edema:  no   Endurance: poor  Increased o2 to 3 Liters of o2 via nasal cannula edge of bed d/t 87% po2    Vitals: 3 Liters of o2 via nasal cannula   SPO2 at rest 96%  SPO2 during session 91%     Patient education  Patient educated on role of Physical Therapy, risks of immobility, safety and plan of care      Patient response to education:   Pt verbalized understanding Pt demonstrated skill Pt requires further education in this area   No No Yes      Treatment:  Patient practiced and was instructed/facilitated in the following treatment: Patient performed rom all extremities /planes and joints  Sat edge of bed 10 minutes with Minimal assist of 1 to increase dynamic sitting balance and activity tolerance. reaching activities     Therapeutic Exercises:  see above       At end of session, patient in bed in chair position with alarm call light and phone within reach,   all lines and tubes intact, nursing notified. Patient would benefit from continued skilled Physical Therapy to improve functional independence and quality of life. Patient's/ family goals   none stated        ASSESSMENT: Patient exhibits decreased strength, balance, coordination impairing functional mobility.    Decreased rom, strength and stamina impacting mobility and safety    Plan of Care:     -Bed Mobility: Lower extremity exercises   -Sitting Balance: Incorporate reaching activities to activate trunk muscles   -Standing Balance: Perform strengthening exercises in standing to promote motor control with or without upper extremity support   -Transfers: Provide instruction on proper hand and foot position for adequate transfer of weight onto lower extremities and use of gait device  -Endurance: Utilize Supervised activities to increase level of endurance to allow for safe functional mobility including transfers and gait     Patient and or family understand(s) diagnosis, prognosis, and plan of care. Frequency of treatments: Patient will be seen    daily. Time in  150  Time out  214    Total Treatment Time  10 minutes    Evaluation time includes thorough review of current medical information, gathering information on past medical history/social history and prior level of function, completion of standardized testing/informal observation of tasks, assessment of data, and development of Plan of care and goals.      CPT codes:  Low Complexity PT evaluation (75724)  Therapeutic activities (82725)   10 minutes  1 unit(s)    Kanika Guadalupe PT

## 2020-11-22 NOTE — PROGRESS NOTES
[facility]  [facility]  Progress Note    NAME:Chris Foley  1931  DATE OF SERVICE:20    FACE TO FACE ENCOUNTER 20  ID FOLLOWING FOR atbx sepsis    SUBJECTIVE:  Chief Complaint   Patient presents with    Loss of Consciousness     Syncope without colapse on toilet. Pt A&O x0 @ baseline. hematuria - was treated with levequin for UTI. New ABX for UTI was to start today. Poss change in mentation?     presented on admission  Today:   In icu   In bed pale does not anseer   fever  Patient is tolerating medications. No reported adverse drug reactions. Review of systems:  As stated above in the chief complaint, otherwise negative. Medications:  Scheduled Meds:   hydrocortisone sodium succinate PF  50 mg Intravenous Q8H    aspirin  81 mg Oral Daily    sodium chloride flush  10 mL Intravenous 2 times per day    heparin (porcine)  5,000 Units Subcutaneous 3 times per day    cefepime  2 g Intravenous Q24H    vancomycin (VANCOCIN) intermittent dosing (placeholder)   Other RX Placeholder    pantoprazole  40 mg Intravenous Daily    And    sodium chloride (PF)  10 mL Intravenous Daily    ipratropium-albuterol  1 ampule Inhalation Q4H WA    doxycycline (VIBRAMYCIN) IV  100 mg Intravenous Q12H     Continuous Infusions:   norepinephrine Stopped (20 1008)    dextrose      sodium chloride 12.5 mL/hr at 20 0427    sodium chloride 150 mL/hr at 20 0846     PRN Meds:sodium chloride flush, acetaminophen **OR** acetaminophen, polyethylene glycol, glucose, dextrose, glucagon (rDNA), dextrose, perflutren lipid microspheres    OBJECTIVE:  /66   Pulse 68   Temp 98.1 °F (36.7 °C) (Core)   Resp 16   Ht 5' 7\" (1.702 m)   Wt 156 lb 4.8 oz (70.9 kg)   SpO2 98%   BMI 24.48 kg/m²   Temp  Av.3 °F (36.8 °C)  Min: 97.7 °F (36.5 °C)  Max: 99 °F (37.2 °C)  Constitutional:  The patient is awake, alert, and oriented. Skin:    Warm and dry.  No rashes negative leticia*     Recent Labs     11/19/20  1855   ORG Gram negative leticia*     Recent Labs     11/19/20 1855 11/19/20 1856   LABURIN  --  10 to 100,000 CFU/mL  Mixed julianne isolated. Further workup and sensitivity testing  is not routinely indicated and will not be performed. Mixed julianne isolated includes:  Mixed gram positive organisms  Gram positive cocci  Corynebacterium species     ORG Gram negative leticia*  --          ASSESSMENT/PLAN:  Sepsis  Gn sepsis  S. pneumoniae pneumonia   leukocytosis  santhosh better uti  hypernatremia         cefepime (MAXIPIME) 2 g IVPB extended (mini-bag), Q24H adjust dose     vancomycin (VANCOCIN) intermittent dosing (placeholder), RX Placeholder     doxycycline (VIBRAMYCIN) 100 mg in dextrose 5 % 100 mL IVPB, Q12H    Check final cxc      · Monitor labs    Imaging and labs were reviewed per medical records. Thank you for involving me in the care of Ivan Crowe will continue to follow. Please do not hesitate to call for any questions or concerns.     Electronically signed by Ab Addison MD on 11/22/2020 at 10:09 AM

## 2020-11-22 NOTE — PROGRESS NOTES
Internal Medicine Progress Note    [facility]    Leandra Collet. Sinai Reinoso., NATALIE Cheema D.O., PRINCESS Shukla, MSN, APRN, NP-C  Luz Elena Aguillon. Kitty Corral, MSN, APRN-CNP     Primary Care Physician: David Jordan MD   Admitting Physician:  Darrin Steven DO  Admission date and time: 11/19/2020  6:24 PM    Room:  [place] [address]  Admitting diagnosis: Septic shock ([facility] [A41.9, R65.21]    Patient Name: Jaz Anderson  MRN: [id number]    Date of Service: 11/22/2020     Subjective:  Tip Mraks is a 80 y.o. male who was seen and examined today,11/22/2020, at the bedside. Tip Marks is wide-awake today but remains relatively noncommunicative. Blood pressure remains labile and he is maintained on vasopressor support. He is tolerating antibiotic therapy. Multiple subspecialists are providing consultation. Review of System:   Unable to be obtained in the patient's current condition with underlying dementia      Physical Exam:  No intake/output data recorded. Intake/Output Summary (Last 24 hours) at 11/22/2020 1105  Last data filed at 11/22/2020 0530  Gross per 24 hour   Intake 5860 ml   Output 1700 ml   Net 4160 ml   I/O last 3 completed shifts: In: 3886 [P.O.:120; I.V.:5390; IV Piggyback:350]  Out: 1700 [Urine:1700]  Patient Vitals for the past 96 hrs (Last 3 readings):   Weight   11/22/20 0600 156 lb 4.8 oz (70.9 kg)   11/21/20 0440 149 lb 8 oz (67.8 kg)   11/19/20 1926 157 lb (71.2 kg)     Vital Signs:   Blood pressure 118/66, pulse 68, temperature 98.1 °F (36.7 °C), temperature source Core, resp. rate 16, height 5' 7\" (1.702 m), weight 156 lb 4.8 oz (70.9 kg), SpO2 98 %. General appearance:  Awake and alert during my examination. He remains noncommunicative. Head:  Normocephalic. No masses, lesions or tenderness. Eyes:  PERRLA. EOMI. Sclera clear.   Buccal mucosa moist.  Bilateral conjunctivitis  ENT:  Ears normal.  Buccal mucosa very dry  Neck:    Supple. Trachea midline. No thyromegaly. No JVD. No bruits. Heart:    Rhythm regular. Mildly tachycardic. 1/6 systolic murmur  Lungs:    Symmetrical. Clear to auscultation bilaterally. No wheezes. No rhonchi. No rales diminished excursion. Abdomen:   Soft. Non-tender. Non-distended. Bowel sounds positive. No organomegaly or masses. No pain on palpation. Extremities:    Peripheral pulses present. No peripheral edema. No ulcers. No cyanosis. No clubbing. Tenting of skin. Dressing to left heel. Triple-lumen catheter left femoral  Neurologic:    Awakens to verbal stimulus. Eyes are open but stares blankly. Does not follow commands nor does answer questions. Musculoskeletal:   Spine ROM normal.  Gait not assessed. Integumentary:  No rashes,  poor skin turgor. Multiple tattoos  Genitalia/Breast:  Voiding with use of a Rdz catheter.     Medication:  Scheduled Meds:   cefepime  2 g Intravenous Q12H    midodrine  10 mg Oral TID WC    hydrocortisone sodium succinate PF  50 mg Intravenous Q8H    aspirin  81 mg Oral Daily    sodium chloride flush  10 mL Intravenous 2 times per day    heparin (porcine)  5,000 Units Subcutaneous 3 times per day    vancomycin (VANCOCIN) intermittent dosing (placeholder)   Other RX Placeholder    pantoprazole  40 mg Intravenous Daily    And    sodium chloride (PF)  10 mL Intravenous Daily    ipratropium-albuterol  1 ampule Inhalation Q4H WA    doxycycline (VIBRAMYCIN) IV  100 mg Intravenous Q12H     Continuous Infusions:   sodium chloride      norepinephrine 1 mcg/min (11/22/20 1048)    dextrose      sodium chloride 150 mL/hr at 11/22/20 0846       Objective Data:  CBC with Differential:    Lab Results   Component Value Date    WBC 11.5 11/22/2020    RBC 2.92 11/22/2020    HGB 8.9 11/22/2020    HCT 29.9 11/22/2020     11/22/2020    .4 11/22/2020    MCH 30.5 11/22/2020    MCHC 29.8 11/22/2020    RDW 14.4 11/22/2020    SEGSPCT 53 10/12/2013 METASPCT 1.0 11/22/2020    LYMPHOPCT 10.0 11/22/2020    MONOPCT 5.0 11/22/2020    MYELOPCT 1.0 11/22/2020    EOSPCT 8.7 10/06/2020    BASOPCT 0.0 11/22/2020    MONOSABS 0.58 11/22/2020    LYMPHSABS 1.15 11/22/2020    EOSABS 0.00 11/22/2020    BASOSABS 0.00 11/22/2020     CMP:    Lab Results   Component Value Date     11/22/2020    K 3.8 11/22/2020    K 5.0 04/14/2019     11/22/2020    CO2 23 11/22/2020    BUN 29 11/22/2020    CREATININE 1.2 11/22/2020    GFRAA >60 11/22/2020    LABGLOM 57 11/22/2020    GLUCOSE 133 11/22/2020    PROT 5.8 11/22/2020    LABALBU 2.6 11/22/2020    CALCIUM 7.6 11/22/2020    BILITOT 0.5 11/22/2020    ALKPHOS 347 11/22/2020     11/22/2020     11/22/2020           Assessment:  1. Septic shock with multiple infectious sources including gram-negative bacteremia, Streptococcus healthcare associated pneumonia, and urinary tract infection  2. Acute renal failure with improvement  3. Shock liver with transaminitis  4. Macrocytic anemia  5. Elevated troponin probably secondary to demand ischemia versus non-ST elevated myocardial infarction  6. BPH  7. Severe dementia    Plan:   Roxann Houser appears clinically improved today. He is maintained on 1 mcg of Levophed this morning in the setting of labile blood pressure. Cultures are finalizing at this point he is maintained on broad-spectrum antibiotic therapy. Multiple subspecialists continue to provide consultation. Greater than 40 minutes of critical care time was spent with the patient. This time included chart review, , and discussion with those consultants involved in the patient's care. More than 50% of my  time was spent at the bedside counseling/coordinating care with the patient and/or family with face to face contact. This time was spent reviewing notes and laboratory data as well as instructing and counseling the patient.  Time I spent with the family or surrogate(s) is included only if the patient was incapable of providing the necessary information or participating in medical decisions. I also discussed the differential diagnosis and all of the proposed management plans with the patient and individuals accompanying the patient. Francisco J Griffiths requires this high level of physician care and nursing on the ICU unit due the complexity of decision management and chance of rapid decline or death. Continued cardiac monitoring and higher level of nursing are required. I am readily available for any further decision-making and intervention.      Lizzy Bautista DO, F.A.C.O.I.  11/22/2020  11:05 AM

## 2020-11-22 NOTE — PROGRESS NOTES
Pharmacy Consultation Note  (Antibiotic Dosing and Monitoring)    Initial consult date: 11/19/2020  Consulting physician: Dr. Tray Willingham  Drug(s): Vancomycin  Indication: Sepsis    Ht Readings from Last 1 Encounters:   11/21/20 5' 7\" (1.702 m)     Wt Readings from Last 1 Encounters:   11/22/20 156 lb 4.8 oz (70.9 kg)     Age/  Gender IBW DW  Allergy Information   80 y.o.   male 66.1 kg 71.2 kg  Patient has no known allergies. Date  Tmax WBC BUN/CR UOP  (mL/kg/hr) Drug/Dose Time   Given Level(s)   (Time) Comments   11/19  (#1) 101.8 24.7 63/2.6 -- Vancomycin 1750 mg IV x 1 2015 11/20  (#2) 99.7 39.7 51/2.1 -- Vancomycin 1,000 mg IV x 1 2053 Random level @ 1841 = 12.3 mcg/mL      11/21  (#3) afebrile 16.3 41/1.5 1 Vancomycin 1,000 mg IV x 1 2030 16.3 mcg/mL @ 9782    11/22  (#4) afebrile 11.5 29/1. 2 1 Vancomycin 1,000 mg IV Q36H --     11/23  (#5)      <0800>       (#6)             (#7)             Estimated Creatinine Clearance: 39 mL/min (based on SCr of 1.2 mg/dL). UOP over the past 24 hours:       Intake/Output Summary (Last 24 hours) at 11/22/2020 1346  Last data filed at 11/22/2020 0530  Gross per 24 hour   Intake 1584 ml   Output 1700 ml   Net -116 ml       Other anti-infectives: Anti-infective Dose Date Initiated Date Stopped   Cefepime 2g IV q24hr 11/19    Doxycycline 100 mg IV q12hr 11/20      Cultures:  available culture and sensitivity results were reviewed in EPIC  Cultures sent and are pending. Culture Date Result    Blood cx 1 11/19 NGTD   Blood cx 2 11/19 E coli   Urine cx 11/19 Mixed julianne   Respiratory panel, molecular 11/19 Not detected   Strep/legionella urine antigens 11/20 Positive for pneumococcal pneumonia  Legionella negative               Assessment:  · Consulted by Dr. José Miguel Gracia to dose/monitor vancomycin  · Goal trough level:  15-20 mcg/mL  · Pt is a 80 yoM who presented from SNF with sepsis 2/2 pneumonia versus UTI.    · Serum creatinine today: 1.2 mg/dL; CrCl ~ 30-40 mL/min; baseline Scr ~ 1.2-1.3 mg/dL  · 11/20: Vancomycin 1750 mg IV x 1 given 11/19 @ 2015  · 11/21: Random AM level = 16.3 mcg/mL    Plan:  · Start Vancomycin 1,000 mg IV Q36H  · Check level prior to third or fourth dose  · Follow renal function  · Pharmacist will follow and monitor/adjust dosing as necessary      Thank you for the consult,    Leah SmithD, BCPS 11/22/2020 1:46 PM   [phone]

## 2020-11-22 NOTE — PROGRESS NOTES
Nephrology Progress Note  Patient's Name: Makenzie Valencia    Nephrologist: Catina Dixon    Reason for Consult:  Hypernatremia and TUNG  Requesting Physician:  Erin Rowe MD    Chief Complaint:  syncope    History Obtained From:  past medical records    History of Present Ilness:    Makenzie Valencia is a 80 y.o. male with prior history of dementia, copd, depression, htn, CKD G3A . Baseline serum cr 1.3mg/dl with an e-GFR=52ml/min who presented last night from a nursing home for syncope. He was hypotensive. He was found to be in septic shock with possible pneumonia and uti. He was started on doxy and vanco. He has been started on ivf and solucortef. Labs show na 159>154, co2 24, bun 47, cr 2.1>1.9, ca 7.5, alb 2.5, hgb 9.5, plt 205, fena <1. Ct shows no hydro. 11/22/20: Pt awake alert oriented to self, gives yes, no answers    Past Medical History:   Diagnosis Date    Chronic kidney disease     COPD (chronic obstructive pulmonary disease) ([address]     [facility]     Depression     Hypertension     Neuromuscular disorder [facility]     Other disorders of kidney and ureter in diseases classified elsewhere     Pneumonia     Psychosis ([address]        Past Surgical History:   Procedure Laterality Date    ABDOMEN SURGERY      APPENDECTOMY      CHOLECYSTECTOMY      COLONOSCOPY      ENDOSCOPY, COLON, DIAGNOSTIC      KIDNEY REMOVAL      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 He quit after 25.00 years of use. He has never used smokeless tobacco. He reports that he does not drink alcohol or use drugs. Allergies:  Patient has no known allergies.     Current Medications:    cefepime (MAXIPIME) 2 g IVPB extended (mini-bag), Q12H    And  0.9 % sodium chloride infusion, Q12H  midodrine (PROAMATINE) tablet 10 mg, TID WC  norepinephrine (LEVOPHED) 16 mg in dextrose 5 % 250 mL infusion, Continuous  hydrocortisone sodium succinate PF (SOLU-CORTEF) injection 50 mg, Q8H  aspirin EC tablet 81 mg, Daily  sodium chloride flush 0.9 % injection 10 mL, 2 times per day  sodium chloride flush 0.9 % injection 10 mL, PRN  acetaminophen (TYLENOL) tablet 650 mg, Q6H PRN    Or  acetaminophen (TYLENOL) suppository 650 mg, Q6H PRN  polyethylene glycol (GLYCOLAX) packet 17 g, Daily PRN  glucose (GLUTOSE) 40 % oral gel 15 g, PRN  dextrose 50 % IV solution, PRN  glucagon (rDNA) injection 1 mg, PRN  dextrose 5 % solution, PRN  heparin (porcine) injection 5,000 Units, 3 times per day  vancomycin (VANCOCIN) intermittent dosing (placeholder), RX Placeholder  0.45 % sodium chloride infusion, Continuous  perflutren lipid microspheres (DEFINITY) injection 1.65 mg, ONCE PRN  pantoprazole (PROTONIX) injection 40 mg, Daily    And  sodium chloride (PF) 0.9 % injection 10 mL, Daily  ipratropium-albuterol (DUONEB) nebulizer solution 1 ampule, Q4H WA  doxycycline (VIBRAMYCIN) 100 mg in dextrose 5 % 100 mL IVPB, Q12H        Review of Systems:   Review of systems not obtained due to patient factors.     Physical exam:   Constitutional:  Elderly male in NAD  Vitals:   VITALS:  /66   Pulse 68   Temp 98.1 °F (36.7 °C) (Core)   Resp 16   Ht 5' 7\" (1.702 m)   Wt 156 lb 4.8 oz (70.9 kg)   SpO2 98%   BMI 24.48 kg/m²   24HR INTAKE/OUTPUT:      Intake/Output Summary (Last 24 hours) at 11/22/2020 1133  Last data filed at 11/22/2020 0530  Gross per 24 hour   Intake 5860 ml   Output 1700 ml   Net 4160 ml     URINARY CATHETER OUTPUT (Rdz):  Urethral Catheter 16 fr-Output (mL): 550 mL  DRAIN/TUBE OUTPUT:     VENT SETTINGS:  Vent Information  Skin Assessment: Clean, dry, & intact  FiO2 : 4 %  SpO2: 98 %  SpO2/FiO2 ratio: 2400  Additional Respiratory  Assessments  Pulse: 68  Resp: 16  SpO2: 98 %  Constitutional: Patient in no acute distress   Head: normocephalic, atraumatic   Neck: supple, no jvd  Cardiovascular: regular rate and rhythm, no murmurs, gallops, or rubs   Respiratory: Clear, no rales, rhochi, or wheezes,   Gastrointestinal: soft, nontender, nondistended, no hepatosplenomegaly  Ext: no edema  Neuro: awake  Skin: dry, no rash   Back: nontender    Data:   Labs:  CBC:   Lab Results   Component Value Date    WBC 11.5 11/22/2020    RBC 2.92 11/22/2020    HGB 8.9 11/22/2020    HCT 29.9 11/22/2020    .4 11/22/2020    MCH 30.5 11/22/2020    MCHC 29.8 11/22/2020    RDW 14.4 11/22/2020     11/22/2020    MPV 12.3 11/22/2020     CBC with Differential:    Lab Results   Component Value Date    WBC 11.5 11/22/2020    RBC 2.92 11/22/2020    HGB 8.9 11/22/2020    HCT 29.9 11/22/2020     11/22/2020    .4 11/22/2020    MCH 30.5 11/22/2020    MCHC 29.8 11/22/2020    RDW 14.4 11/22/2020    SEGSPCT 53 10/12/2013    METASPCT 1.0 11/22/2020    LYMPHOPCT 10.0 11/22/2020    MONOPCT 5.0 11/22/2020    MYELOPCT 1.0 11/22/2020    EOSPCT 8.7 10/06/2020    BASOPCT 0.0 11/22/2020    MONOSABS 0.58 11/22/2020    LYMPHSABS 1.15 11/22/2020    EOSABS 0.00 11/22/2020    BASOSABS 0.00 11/22/2020     Hemoglobin/Hematocrit:    Lab Results   Component Value Date    HGB 8.9 11/22/2020    HCT 29.9 11/22/2020     CMP:    Lab Results   Component Value Date     11/22/2020    K 3.8 11/22/2020    K 5.0 04/14/2019     11/22/2020    CO2 23 11/22/2020    BUN 29 11/22/2020    CREATININE 1.2 11/22/2020    GFRAA >60 11/22/2020    LABGLOM 57 11/22/2020    GLUCOSE 133 11/22/2020    PROT 5.8 11/22/2020    LABALBU 2.6 11/22/2020    CALCIUM 7.6 11/22/2020    BILITOT 0.5 11/22/2020    ALKPHOS 347 11/22/2020     11/22/2020     11/22/2020     BMP:    Lab Results   Component Value Date     11/22/2020    K 3.8 11/22/2020    K 5.0 04/14/2019     11/22/2020    CO2 23 11/22/2020    BUN 29 11/22/2020    LABALBU 2.6 11/22/2020    CREATININE 1.2 11/22/2020    CALCIUM 7.6 11/22/2020    GFRAA >60 11/22/2020    LABGLOM 57 11/22/2020 GLUCOSE 133 11/22/2020     Sodium:    Lab Results   Component Value Date     11/22/2020     BUN/Creatinine:    Lab Results   Component Value Date    BUN 29 11/22/2020    CREATININE 1.2 11/22/2020     Hepatic Function Panel:    Lab Results   Component Value Date    VUKDGKN 978 11/22/2020     11/22/2020     11/22/2020    PROT 5.8 11/22/2020    BILITOT 0.5 11/22/2020    BILIDIR 0.4 11/20/2020    IBILI 0.2 11/20/2020    LABALBU 2.6 11/22/2020     Calcium:    Lab Results   Component Value Date    CALCIUM 7.6 11/22/2020     Ionized Calcium:  No results found for: IONCA  Magnesium:    Lab Results   Component Value Date    MG 1.9 11/22/2020     Phosphorus:    Lab Results   Component Value Date    PHOS 3.5 11/22/2020     LDH:  No results found for: LDH  Uric Acid:  No results found for: LABURIC, URICACID  PT/INR:    Lab Results   Component Value Date    PROTIME 15.9 11/19/2020    INR 1.4 11/19/2020     PTT:    Lab Results   Component Value Date    APTT 21.9 11/19/2020   [APTT}  Troponin:    Lab Results   Component Value Date    TROPONINI 0.16 11/21/2020     U/A:    Lab Results   Component Value Date    COLORU DIANNA 11/19/2020    PROTEINU >=300 11/19/2020    PHUR 8.5 11/19/2020    WBCUA 0-1 11/19/2020    RBCUA >20 11/19/2020    RBCUA PACKED 10/08/2013    BACTERIA RARE 11/19/2020    CLARITYU CLOUDY 11/19/2020    SPECGRAV 1.010 11/19/2020    LEUKOCYTESUR SMALL 11/19/2020    UROBILINOGEN 2.0 11/19/2020    BILIRUBINUR SMALL 11/19/2020    BILIRUBINUR Negative 11/09/2020    BLOODU LARGE 11/19/2020    GLUCOSEU Negative 11/19/2020    AMORPHOUS MODERATE 11/19/2020     ABG:    Lab Results   Component Value Date    PH 7.359 11/21/2020    PCO2 34.9 11/21/2020    PO2 98.9 11/21/2020    HCO3 19.2 11/21/2020    BE -5.6 11/21/2020    O2SAT 96.7 11/21/2020     HgBA1c:    Lab Results   Component Value Date    LABA1C 6.0 11/20/2020     Microalbumen/Creatinine ratio:  No components found for: RUCREAT  FLP:    Lab Results Component Value Date    TRIG 111 03/25/2019    HDL 30 03/25/2019    LDLCALC 64 03/25/2019    LABVLDL 22 03/25/2019     TSH:    Lab Results   Component Value Date    TSH 2.520 11/20/2020     VITAMIN B12: No components found for: B12  FOLATE:  No results found for: FOLATE  Iron Saturation:  No components found for: PERCENTFE  FERRITIN:  No results found for: FERRITIN  AMYLASE:  No results found for: AMYLASE  LIPASE:  No results found for: LIPASE  24 Hour Urine for Protein:  No components found for: Irene Jae, ENEL05MK, UTV3  24 Hour Urine for Creatinine Clearance:  No components found for: CREAT4, UHRS10, UTV10  PSA:   Lab Results   Component Value Date    PSA 1.38 03/24/2019 11/20/20 2 D ECHO:   Summary    Left ventricular size is grossly normal.    Mild left ventricular concentric hypertrophy noted.    Ejection fraction is measured at 82%.    No evidence of left ventricular mass or thrombus noted.    No regional wall motion abnormalities seen.    Physiologic and/or trace mitral regurgitation is present.    No evidence of mitral valve stenosis.    Aortic valve opens well.    The aortic valve is trileaflet.    No hemodynamically significant aortic stenosis is present.    Physiologic and/or trace aortic regurgitation is noted.    Pulmonary hypertension is mild .    Physiologic and/or trace tricuspid regurgitation.    Regular rhythm. Imaging:  CXR results:  EXAMINATION:    ONE XRAY VIEW OF THE CHEST    11/19/2020 7:19 pm    COMPARISON:    June 7, 2016    HISTORY:    ORDERING SYSTEM PROVIDED HISTORY: Shortness of breath    TECHNOLOGIST PROVIDED HISTORY:    Reason for exam:->Shortness of breath    FINDINGS:    Increased bilateral lung opacities.  Cardiomegaly.  Atherosclerotic    calcification of the thoracic aorta.         Impression    Increased bilateral lung opacities are suspicious for multifocal pneumonia.       EXAMINATION:    CT OF THE ABDOMEN AND PELVIS WITHOUT CONTRAST 11/19/2020 8:30 pm TECHNIQUE:    CT of the abdomen and pelvis was performed without the administration of    intravenous contrast. Multiplanar reformatted images are provided for review. Dose modulation, iterative reconstruction, and/or weight based adjustment of    the mA/kV was utilized to reduce the radiation dose to as low as reasonably    achievable. COMPARISON:    None. HISTORY:    ORDERING SYSTEM PROVIDED HISTORY: septic concern for intraabdominal pathology    TECHNOLOGIST PROVIDED HISTORY:    Reason for exam:->septic concern for intraabdominal pathology    Additional Contrast?->None    FINDINGS:    Lower Chest: Extensive interstitial markings and patchy opacities at the    visualized lung bases.  No large pleural effusions.  The heart is mildly    enlarged with no pericardial effusion. Organs: The liver spleen, pancreas and adrenal glands appear unremarkable on    this non IV contrast scan.  The kidneys appear slightly atrophic with no    evidence of renal stones or hydronephrosis.  The gallbladder is surgically    absent. GI/Bowel: No evidence of obstructing or constricting mass lesions. Alma Jarquin is    sigmoid diverticulosis with no evidence of diverticulitis. Pelvis: The bladder is collapsed with Rdz catheter in place there are    pockets of gas in the bladder which may have been introduced during    catheterization.  No significant adenopathy or ascites noted. Peritoneum/Retroperitoneum: No significant ascites or adenopathy.  No free    air.  Abdominal aorta shows atherosclerotic calcification with no evidence of    abdominal aortic aneurysm.  No periaortic fluid collection. Bones/Soft Tissues: No lytic or blastic bony lesions.  Degenerative changes    in the spine.         Impression    No evidence of acute intra-abdominal process, status post cholecystectomy. Sigmoid diverticulosis. Infiltrates at the visualized lung bases. Assessment  1.  Stage I TUNG on ckd 3a  Cr 1/2020 was 1.3 with gfr of 52ml/min  Presumed sec to decreased effective renal perfusion in the setting of the sepsis with hypotension and the free water deficit  No hydro on mayra  UA Bili small Blood large, protein>300mg/dl, small LE,  fena <1  Cr trending down  PLAN:1. Follow Cr     2. Hypernatremia-sec to decreased concentrating capacity in an elederly pt fur there exacerbated by nthe AMS and decreased ability for intake of free water  free water deficit 5.8L based on the Na+=159  Na+ down to 143  PLAN:1. Decrease ivf d5 0.45NS at the increased rate of 100ml/hr       3. Hypotension/sepsis/leukocytosis-uti/pna  BC (+) Gram (-) rods-(+) E coli  PLAN:1. Follow on empiric abx     4. Hypocalcemia  In setting of hypoalbuminemia  Ionized Ca++ 1.17 WNL  PLAN:1. Await  PTH, Vit D    5. 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 Await  B12, folate, SPEP and UPEP      Thank you for allowing us to participate in care of Danica Webb

## 2020-11-22 NOTE — PROGRESS NOTES
acetaminophen, polyethylene glycol, glucose, dextrose, glucagon (rDNA), dextrose, perflutren lipid microspheres    OBJECTIVE:  Vitals:    11/22/20 0900   BP: 118/66   Pulse: 68   Resp: 16   Temp:    SpO2: 98%     FiO2 : 4 %  O2 Flow Rate (L/min): 2 L/min  O2 Device: Nasal cannula        LABS:  WBC   Date Value Ref Range Status   11/22/2020 11.5 4.5 - 11.5 E9/L Final   11/21/2020 16.3 (H) 4.5 - 11.5 E9/L Final   11/20/2020 39.7 (H) 4.5 - 11.5 E9/L Final     Hemoglobin   Date Value Ref Range Status   11/22/2020 8.9 (L) 12.5 - 16.5 g/dL Final   11/21/2020 9.3 (L) 12.5 - 16.5 g/dL Final   11/20/2020 9.5 (L) 12.5 - 16.5 g/dL Final     Hematocrit   Date Value Ref Range Status   11/22/2020 29.9 (L) 37.0 - 54.0 % Final   11/21/2020 30.7 (L) 37.0 - 54.0 % Final   11/20/2020 31.0 (L) 37.0 - 54.0 % Final     MCV   Date Value Ref Range Status   11/22/2020 102.4 (H) 80.0 - 99.9 fL Final   11/21/2020 102.3 (H) 80.0 - 99.9 fL Final   11/20/2020 100.3 (H) 80.0 - 99.9 fL Final     Platelets   Date Value Ref Range Status   11/22/2020 153 130 - 450 E9/L Final   11/21/2020 181 130 - 450 E9/L Final   11/20/2020 205 130 - 450 E9/L Final     Sodium   Date Value Ref Range Status   11/22/2020 143 132 - 146 mmol/L Final   11/21/2020 147 (H) 132 - 146 mmol/L Final   11/21/2020 150 (H) 132 - 146 mmol/L Final     Potassium   Date Value Ref Range Status   11/22/2020 3.8 3.5 - 5.0 mmol/L Final   11/21/2020 3.8 3.5 - 5.0 mmol/L Final   11/21/2020 3.9 3.5 - 5.0 mmol/L Final     Potassium reflex Magnesium   Date Value Ref Range Status   04/14/2019 5.0 3.5 - 5.0 mmol/L Final     Chloride   Date Value Ref Range Status   11/22/2020 112 (H) 98 - 107 mmol/L Final   11/21/2020 117 (H) 98 - 107 mmol/L Final   11/21/2020 118 (H) 98 - 107 mmol/L Final     CO2   Date Value Ref Range Status   11/22/2020 23 22 - 29 mmol/L Final   11/21/2020 23 22 - 29 mmol/L Final   11/21/2020 23 22 - 29 mmol/L Final     BUN   Date Value Ref Range Status   11/22/2020 29 (H) 8 - 23 mg/dL Final   11/21/2020 37 (H) 8 - 23 mg/dL Final   11/21/2020 41 (H) 8 - 23 mg/dL Final     CREATININE   Date Value Ref Range Status   11/22/2020 1.2 0.7 - 1.2 mg/dL Final   11/21/2020 1.4 (H) 0.7 - 1.2 mg/dL Final   11/21/2020 1.5 (H) 0.7 - 1.2 mg/dL Final     Glucose   Date Value Ref Range Status   11/22/2020 133 (H) 74 - 99 mg/dL Final   11/21/2020 100 (H) 74 - 99 mg/dL Final   11/21/2020 128 (H) 74 - 99 mg/dL Final     Calcium   Date Value Ref Range Status   11/22/2020 7.6 (L) 8.6 - 10.2 mg/dL Final   11/21/2020 7.3 (L) 8.6 - 10.2 mg/dL Final   11/21/2020 7.4 (L) 8.6 - 10.2 mg/dL Final     Total Protein   Date Value Ref Range Status   11/22/2020 5.8 (L) 6.4 - 8.3 g/dL Final   11/21/2020 5.5 (L) 6.4 - 8.3 g/dL Final   11/20/2020 5.7 (L) 6.4 - 8.3 g/dL Final     Alb   Date Value Ref Range Status   11/22/2020 2.6 (L) 3.5 - 5.2 g/dL Final   11/21/2020 2.4 (L) 3.5 - 5.2 g/dL Final   11/20/2020 2.5 (L) 3.5 - 5.2 g/dL Final     Total Bilirubin   Date Value Ref Range Status   11/22/2020 0.5 0.0 - 1.2 mg/dL Final   11/21/2020 0.4 0.0 - 1.2 mg/dL Final   11/20/2020 0.6 0.0 - 1.2 mg/dL Final     Alkaline Phosphatase   Date Value Ref Range Status   11/22/2020 347 (H) 40 - 129 U/L Final   11/21/2020 217 (H) 40 - 129 U/L Final   11/20/2020 292 (H) 40 - 129 U/L Final     AST   Date Value Ref Range Status   11/22/2020 112 (H) 0 - 39 U/L Final   11/21/2020 64 (H) 0 - 39 U/L Final   11/20/2020 274 (H) 0 - 39 U/L Final     ALT   Date Value Ref Range Status   11/22/2020 106 (H) 0 - 40 U/L Final   11/21/2020 95 (H) 0 - 40 U/L Final   11/20/2020 174 (H) 0 - 40 U/L Final     GFR Non-   Date Value Ref Range Status   11/22/2020 57 >=60 mL/min/1.73 Final     Comment:     Chronic Kidney Disease: less than 60 ml/min/1.73 sq.m. Kidney Failure: less than 15 ml/min/1.73 sq.m. Results valid for patients 18 years and older.      11/21/2020 48 >=60 mL/min/1.73 Final     Comment:     Chronic Kidney Disease: less than 60 ml/min/1.73 sq.m. Kidney Failure: less than 15 ml/min/1.73 sq.m. Results valid for patients 18 years and older. 11/21/2020 44 >=60 mL/min/1.73 Final     Comment:     Chronic Kidney Disease: less than 60 ml/min/1.73 sq.m. Kidney Failure: less than 15 ml/min/1.73 sq.m. Results valid for patients 18 years and older. GFR    Date Value Ref Range Status   11/22/2020 >60  Final   11/21/2020 58  Final   11/21/2020 53  Final     Magnesium   Date Value Ref Range Status   11/22/2020 1.9 1.6 - 2.6 mg/dL Final   11/21/2020 2.0 1.6 - 2.6 mg/dL Final   11/20/2020 2.1 1.6 - 2.6 mg/dL Final     Phosphorus   Date Value Ref Range Status   11/22/2020 3.5 2.5 - 4.5 mg/dL Final   11/21/2020 4.0 2.5 - 4.5 mg/dL Final   11/20/2020 3.4 2.5 - 4.5 mg/dL Final     Recent Labs     11/21/20  0527   PH 7.359   PO2 98.9   PCO2 34.9*   HCO3 19.2*   BE -5.6*   O2SAT 96.7       RADIOLOGY:  XR CHEST PORTABLE   Final Result   Improving but persistent multifocal pneumonia. XR CHEST PORTABLE   Final Result   Unchanged diffuse pulmonary infiltrates. CT HEAD WO CONTRAST   Final Result   No acute intracranial abnormality. CT ABDOMEN PELVIS WO CONTRAST Additional Contrast? None   Final Result   No evidence of acute intra-abdominal process, status post cholecystectomy. Sigmoid diverticulosis. Infiltrates at the visualized lung bases. XR CHEST PORTABLE   Final Result   Increased bilateral lung opacities are suspicious for multifocal pneumonia. XR CHEST PORTABLE    (Results Pending)     PROBLEM LIST:  Principal Problem:    Septic shock ([address]  Resolved Problems:    * No resolved hospital problems.  *  /66   Pulse 68   Temp 98.1 °F (36.7 °C) (Core)   Resp 16   Ht 5' 7\" (1.702 m)   Wt 156 lb 4.8 oz (70.9 kg)   SpO2 98%   BMI 24.48 kg/m²     General: Awake and alert, non verbal  HEENT: No head lesions, PERRL, EOMI, mouth without lesions, no nasal lesions, no cervical

## 2020-11-23 NOTE — PROGRESS NOTES
Internal Medicine Progress Note    [facility]    Niels Edwards. Elicia Li., HECTOR.ROMIOPratimaI. Barbara Ugarte D.O., PRINCESS Blanchard, MSN, APRN, NP-C  Audelia Reina, MSN, APRN-CNP     Primary Care Physician: Sudeep Benjamin MD   Admitting Physician:  Fredi Strauss DO  Admission date and time: 11/19/2020  6:24 PM    Room:  [address] [place] [address]  Admitting diagnosis: Septic shock [facility]) [A41.9, R65.21]    Patient Name: Frances Pimentel  MRN: [id number]    Date of Service: 11/23/2020     Subjective:  Robert Tatum is a 80 y.o. male who was seen and examined today,11/23/2020, at the bedside. Robert Tatum was transferred from the intensive care unit yesterday after being successfully weaned off vasopressor therapy. His blood pressure remained stable at this point. The therapy teams will provide consultation and we will attempt to advance his diet. Antibiotics are being administered as per the infectious disease team.  He is more awake and alert than he has been since hospitalization. I suspect he is approaching his baseline. Review of System:   Unable to be obtained in the patient's current condition with underlying dementia      Physical Exam:  No intake/output data recorded. Intake/Output Summary (Last 24 hours) at 11/23/2020 0832  Last data filed at 11/23/2020 8452  Gross per 24 hour   Intake 2807.69 ml   Output 1100 ml   Net 1707.69 ml   I/O last 3 completed shifts: In: 2807.7 [I.V.:2657.7; IV Piggyback:150]  Out: 1100 [Urine:1100]  Patient Vitals for the past 96 hrs (Last 3 readings):   Weight   11/22/20 0600 156 lb 4.8 oz (70.9 kg)   11/21/20 0440 149 lb 8 oz (67.8 kg)   11/19/20 1926 157 lb (71.2 kg)     Vital Signs:   Blood pressure 132/64, pulse 88, temperature 98.8 °F (37.1 °C), temperature source Axillary, resp. rate 22, height 5' 7\" (1.702 m), weight 156 lb 4.8 oz (70.9 kg), SpO2 95 %. General appearance:  Awake and alert during my examination.   He attempts to answer questions accordingly. Head:  Normocephalic. No masses, lesions or tenderness. Eyes:  PERRLA. EOMI. Sclera clear. Buccal mucosa moist.  Bilateral conjunctivitis  ENT:  Ears normal.  Buccal mucosa very dry  Neck:    Supple. Trachea midline. No thyromegaly. No JVD. No bruits. Heart:    Rhythm regular. Mildly tachycardic. 1/6 systolic murmur  Lungs:    Symmetrical. Clear to auscultation bilaterally. No wheezes. No rhonchi. No rales diminished excursion. Abdomen:   Soft. Non-tender. Non-distended. Bowel sounds positive. No organomegaly or masses. No pain on palpation. Extremities:    Peripheral pulses present. No peripheral edema. No ulcers. No cyanosis. No clubbing. Tenting of skin. Dressing to left heel. Triple-lumen catheter left femoral  Neurologic:    Awakens to verbal stimulus. Eyes are open but stares blankly. Does not follow commands nor does answer questions. Musculoskeletal:   Spine ROM normal.  Gait not assessed. Integumentary:  No rashes,  poor skin turgor. Multiple tattoos  Genitalia/Breast:  Voiding with use of a Rdz catheter.     Medication:  Scheduled Meds:   hydrocortisone sodium succinate PF  50 mg Intravenous BID    pantoprazole  40 mg Oral QAM AC    albumin human  50 g Intravenous Q8H    cefepime  2 g Intravenous Q12H    midodrine  10 mg Oral TID WC    vancomycin  1,000 mg Intravenous Q36H    aspirin  81 mg Oral Daily    sodium chloride flush  10 mL Intravenous 2 times per day    heparin (porcine)  5,000 Units Subcutaneous 3 times per day    ipratropium-albuterol  1 ampule Inhalation Q4H WA    doxycycline (VIBRAMYCIN) IV  100 mg Intravenous Q12H     Continuous Infusions:   0.9% NaCl with KCl 40 mEq      sodium chloride Stopped (11/23/20 0700)    dextrose         Objective Data:  CBC with Differential:    Lab Results   Component Value Date    WBC 7.7 11/23/2020    RBC 2.70 11/23/2020    HGB 8.4 11/23/2020    HCT 26.6 11/23/2020     11/23/2020    MCV 98.5 11/23/2020    MCH 31.1 11/23/2020    MCHC 31.6 11/23/2020    RDW 14.4 11/23/2020    SEGSPCT 53 10/12/2013    METASPCT 1.0 11/22/2020    LYMPHOPCT 16.6 11/23/2020    MONOPCT 9.7 11/23/2020    MYELOPCT 1.0 11/22/2020    EOSPCT 8.7 10/06/2020    BASOPCT 0.3 11/23/2020    MONOSABS 0.75 11/23/2020    LYMPHSABS 1.28 11/23/2020    EOSABS 0.01 11/23/2020    BASOSABS 0.02 11/23/2020     CMP:    Lab Results   Component Value Date     11/23/2020    K 3.9 11/23/2020    K 5.0 04/14/2019     11/23/2020    CO2 23 11/23/2020    BUN 28 11/23/2020    CREATININE 1.2 11/23/2020    GFRAA >60 11/23/2020    LABGLOM 57 11/23/2020    GLUCOSE 106 11/23/2020    PROT 5.4 11/23/2020    LABALBU 2.3 11/23/2020    CALCIUM 7.4 11/23/2020    BILITOT 0.4 11/23/2020    ALKPHOS 246 11/23/2020    AST 50 11/23/2020    ALT 68 11/23/2020           Assessment:  1. Septic shock with multiple infectious sources including gram-negative bacteremia, Streptococcus healthcare associated pneumonia, and urinary tract infection  2. Acute renal failure with improvement  3. Shock liver with transaminitis  4. Macrocytic anemia  5. Elevated troponin probably secondary to demand ischemia versus non-ST elevated myocardial infarction  6. BPH  7. Severe dementia    Plan:   Refugia Amanda has improved dramatically and has been transferred from the intensive care unit. Vasopressor therapy has been weaned off. We will begin de-escalating stress dose corticosteroids. IV fluid resuscitation is being employed and renal function has improved. Antibiotics are being administered at the discretion of the infectious disease team.  We will await final antibiotic recommendations. He will work with the speech therapy team today and a formal swallow study will be obtained. Diet will be advanced thereafter. We will begin resuming his home medications. I believe he is approaching his chronically debilitated baseline.   Discharge planning is underway and I anticipate discharge in the next 24 to 48 hours. More than 50% of my  time was spent at the bedside counseling/coordinating care with the patient and/or family with face to face contact. This time was spent reviewing notes and laboratory data as well as instructing and counseling the patient. Time I spent with the family or surrogate(s) is included only if the patient was incapable of providing the necessary information or participating in medical decisions. I also discussed the differential diagnosis and all of the proposed management plans with the patient and individuals accompanying the patient. Osei Lamb requires this high level of physician care and nursing on the ICU unit due the complexity of decision management and chance of rapid decline or death. Continued cardiac monitoring and higher level of nursing are required. I am readily available for any further decision-making and intervention.      Yancy Beyer DO, F.A.C.O.I.  11/23/2020  8:32 AM

## 2020-11-23 NOTE — PROGRESS NOTES
Nephrology Progress Note  Patient's Name: Jonathan De La Torre    Reason for Consult:  Hypernatremia and TUNG  Requesting Physician:  Shara Velez MD    Chief Complaint:  syncope    History Obtained From:  past medical records    History of Present Ilness:   FROM 11-21  Jonathan De La Torre is a 80 y.o. male with prior history of dementia, copd, depression, htn, CKD G3A . Baseline serum cr 1.3mg/dl with an e-GFR=52ml/min who presented last night from a nursing home for syncope. He was hypotensive. He was found to be in septic shock with possible pneumonia and uti. He was started on doxy and vanco. He has been started on ivf and solucortef. Labs show na 159>154, co2 24, bun 47, cr 2.1>1.9, ca 7.5, alb 2.5, hgb 9.5, plt 205, fena <1. Ct shows no hydro. 11/23: seen and examined. Limited history and ROS due to dementia. Denies pain, sob      Past Medical History:   Diagnosis Date    Chronic kidney disease     COPD (chronic obstructive pulmonary disease) (HCC)     Dementia ([facility]     Depression     Hypertension     Neuromuscular disorder ([facility]     Other disorders of kidney and ureter in diseases classified elsewhere     Pneumonia     Psychosis ([facility]        Past Surgical History:   Procedure Laterality Date    ABDOMEN SURGERY      APPENDECTOMY      CHOLECYSTECTOMY      COLONOSCOPY      ENDOSCOPY, COLON, DIAGNOSTIC      KIDNEY REMOVAL      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 He quit after 25.00 years of use. He has never used smokeless tobacco. He reports that he does not drink alcohol or use drugs. Allergies:  Patient has no known allergies.     Current Medications:    hydrocortisone sodium succinate PF (SOLU-CORTEF) injection 50 mg, BID  pantoprazole (PROTONIX) tablet 40 mg, QAM AC  0.9% NaCl with KCl 40 mEq infusion, Continuous  albumin human 25 % IV solution 50 g, Q8H  cefepime (MAXIPIME) Value Date    CALCIUM 7.4 11/23/2020     Ionized Calcium:  No results found for: IONCA  Magnesium:    Lab Results   Component Value Date    MG 2.0 11/23/2020     Phosphorus:    Lab Results   Component Value Date    PHOS 3.9 11/23/2020     LDH:  No results found for: LDH  Uric Acid:  No results found for: LABURIC, URICACID  PT/INR:    Lab Results   Component Value Date    PROTIME 15.9 11/19/2020    INR 1.4 11/19/2020     PTT:    Lab Results   Component Value Date    APTT 21.9 11/19/2020   [APTT}  Troponin:    Lab Results   Component Value Date    TROPONINI 0.16 11/21/2020     U/A:    Lab Results   Component Value Date    COLORU DIANNA 11/19/2020    PROTEINU >=300 11/19/2020    PHUR 8.5 11/19/2020    WBCUA 0-1 11/19/2020    RBCUA >20 11/19/2020    RBCUA PACKED 10/08/2013    BACTERIA RARE 11/19/2020    CLARITYU CLOUDY 11/19/2020    SPECGRAV 1.010 11/19/2020    LEUKOCYTESUR SMALL 11/19/2020    UROBILINOGEN 2.0 11/19/2020    BILIRUBINUR SMALL 11/19/2020    BILIRUBINUR Negative 11/09/2020    BLOODU LARGE 11/19/2020    GLUCOSEU Negative 11/19/2020    AMORPHOUS MODERATE 11/19/2020     ABG:    Lab Results   Component Value Date    PH 7.359 11/21/2020    PCO2 34.9 11/21/2020    PO2 98.9 11/21/2020    HCO3 19.2 11/21/2020    BE -5.6 11/21/2020    O2SAT 96.7 11/21/2020     HgBA1c:    Lab Results   Component Value Date    LABA1C 6.0 11/20/2020     Microalbumen/Creatinine ratio:  No components found for: RUCREAT  FLP:    Lab Results   Component Value Date    TRIG 111 03/25/2019    HDL 30 03/25/2019    LDLCALC 64 03/25/2019    LABVLDL 22 03/25/2019     TSH:    Lab Results   Component Value Date    TSH 2.520 11/20/2020     VITAMIN B12: No components found for: B12  FOLATE:    Lab Results   Component Value Date    FOLATE 7.8 11/22/2020     Iron Saturation:  No components found for: PERCENTFE  FERRITIN:  No results found for: FERRITIN  AMYLASE:  No results found for: AMYLASE  LIPASE:  No results found for: LIPASE  24 Hour Urine for Protein:  No components found for: Alonza Cassis, [id number], UTV3  24 Hour Urine for Creatinine Clearance:  No components found for: CREAT4, UHRS10, UTV10  PSA:   Lab Results   Component Value Date    PSA 1.38 03/24/2019 11/20/20 2 D ECHO:   Summary    Left ventricular size is grossly normal.    Mild left ventricular concentric hypertrophy noted.    Ejection fraction is measured at 82%.    No evidence of left ventricular mass or thrombus noted.    No regional wall motion abnormalities seen.    Physiologic and/or trace mitral regurgitation is present.    No evidence of mitral valve stenosis.    Aortic valve opens well.    The aortic valve is trileaflet.    No hemodynamically significant aortic stenosis is present.    Physiologic and/or trace aortic regurgitation is noted.    Pulmonary hypertension is mild .    Physiologic and/or trace tricuspid regurgitation.    Regular rhythm. Imaging:  CXR results:  EXAMINATION:    ONE XRAY VIEW OF THE CHEST    11/19/2020 7:19 pm    COMPARISON:    June 7, 2016    HISTORY:    ORDERING SYSTEM PROVIDED HISTORY: Shortness of breath    TECHNOLOGIST PROVIDED HISTORY:    Reason for exam:->Shortness of breath    FINDINGS:    Increased bilateral lung opacities.  Cardiomegaly.  Atherosclerotic    calcification of the thoracic aorta.         Impression    Increased bilateral lung opacities are suspicious for multifocal pneumonia. EXAMINATION:    CT OF THE ABDOMEN AND PELVIS WITHOUT CONTRAST 11/19/2020 8:30 pm    TECHNIQUE:    CT of the abdomen and pelvis was performed without the administration of    intravenous contrast. Multiplanar reformatted images are provided for review. Dose modulation, iterative reconstruction, and/or weight based adjustment of    the mA/kV was utilized to reduce the radiation dose to as low as reasonably    achievable. COMPARISON:    None.     HISTORY:    ORDERING SYSTEM PROVIDED HISTORY: septic concern for intraabdominal pathology    TECHNOLOGIST PROVIDED HISTORY:    Reason for exam:->septic concern for intraabdominal pathology    Additional Contrast?->None    FINDINGS:    Lower Chest: Extensive interstitial markings and patchy opacities at the    visualized lung bases.  No large pleural effusions.  The heart is mildly    enlarged with no pericardial effusion. Organs: The liver spleen, pancreas and adrenal glands appear unremarkable on    this non IV contrast scan.  The kidneys appear slightly atrophic with no    evidence of renal stones or hydronephrosis.  The gallbladder is surgically    absent. GI/Bowel: No evidence of obstructing or constricting mass lesions. Garry Dani is    sigmoid diverticulosis with no evidence of diverticulitis. Pelvis: The bladder is collapsed with Rdz catheter in place there are    pockets of gas in the bladder which may have been introduced during    catheterization.  No significant adenopathy or ascites noted. Peritoneum/Retroperitoneum: No significant ascites or adenopathy.  No free    air.  Abdominal aorta shows atherosclerotic calcification with no evidence of    abdominal aortic aneurysm.  No periaortic fluid collection. Bones/Soft Tissues: No lytic or blastic bony lesions.  Degenerative changes    in the spine.         Impression    No evidence of acute intra-abdominal process, status post cholecystectomy. Sigmoid diverticulosis. Infiltrates at the visualized lung bases. Assessment  1. Stage I TUNG on ckd 3a  Cr 1/2020 was 1.3 with gfr of 52ml/min  Presumed sec to decreased effective renal perfusion in the setting of the sepsis with hypotension and the free water deficit  No hydro on mayra  UA Bili small Blood large, protein>300mg/dl, small LE,  fena <1    Renal function improving       2.  Hypernatremia-sec to decreased concentrating capacity in an elederly pt fur there exacerbated by nthe AMS and decreased ability for intake of free water  Improved  Follow on isotonic fluids  Just returned from

## 2020-11-23 NOTE — CARE COORDINATION
SS Note: HENS Exemption form completed for [facility].   Electronically signed by GERALD Kim on 11/23/2020 at 9:55 AM

## 2020-11-23 NOTE — CARE COORDINATION
11/23/2020 No covid testing. Cm transition of care: met with patient -he is alert with minimal conversation. Pt is currently from Assisted living [facility] PT/OT recommendations for LUZ- new consult to [facility])  Skilled. pts legal guardian (brother) Paulina Bray choiced for SNF. Watch for IV antibiotics at discharge- pt would need a picc line at discharge. Pt is Medicare the 3 day rule has been waived at this time due to the pandemic. No Precert, HENS needed, JOLENE at discharge. CM/SS to follow.  Electronically signed by Luz Roberson RN-BC on 11/23/2020 at 9:20 AM

## 2020-11-23 NOTE — PROGRESS NOTES
1600 - spoke with Chai eWbb and states he wants us to honor the pts wish of [facility] and no intubation/vent. Please keep him updated on pts status.

## 2020-11-23 NOTE — PROGRESS NOTES
SPEECH/LANGUAGE PATHOLOGY  VIDEOFLUOROSCOPIC STUDY OF SWALLOWING (MBS)    PATIENT NAME:  Sushil Foley      :  1931      TODAY'S DATE:  2020  ROOM:  7071/3710-71    SUMMARY OF EVALUATION     DYSPHAGIA DIAGNOSIS:  Mild oropharyngeal dysphagia       DIET RECOMMENDATIONS:  Dysphagia 2,  Mechanical Soft (Minced & Moist) solids with  thin liquids     FEEDING RECOMMENDATIONS:     Assistance level:  Set-up is required for all oral intake      Compensatory strategies recommended: Small bites/sips and Alternate solids and liquids    THERAPY RECOMMENDATIONS:      Dysphagia therapy is recommended 3-5 times per week for LOS or when goals are met. Instruction regarding appropriate implementation of compensatory strategies to improve integrity of swallow function during PO intake   Meal endurance analysis 1-2 sessions to provide diet modification and compensatory strategy implementation due to need to ensure proper implementation of compensatory strategies during PO intake         Patient report:  Not able due to confusion from assisted living     Diet prior to admit:      Unclear     Communication/Cognition:  Impaired               PROCEDURE       MBSImP Results:   Lip closure for intraoral bolus containment resulted in no labial escape. Tongue control during bolus hold maintained a cohesive bolus held between tongue to palate seal. Bolus preparation and mastication solid not given due to edentulous and patient not able to relay if he wears dentures. Bolus transport/lingual motion was with repetitive tongue motion. Oral residue was not observed. Initiation of the pharyngeal swallow occurred as the bolus head reached the laryngeal surface of the epiglottis. Soft palate elevation resulted in no bolus between the soft palate and the pharyngeal wall. Laryngeal elevation demonstrated complete superior movement of the thyroid cartilage with complete approximation of the arytenoids to the epiglottic petiole. Anterior hyoid excursion demonstrated complete anterior movement. Epiglottic movement resulted in complete inversion. Laryngeal vestibule closure was complete, as indicated by no air or contrast within the laryngeal vestibule at the height of the swallow. Pharyngeal stripping wave was present and complete. Pharyngeal contraction could not be determined due to logistical reasons not related to physiologic impairment. Pharyngoesophageal segment opening was completely distended for complete duration with no obstruction of bolus flow. Tongue base retraction allowed no contrast between the retracted tongue base and the posterior pharyngeal wall. Pharyngeal residue was not present. Esophageal clearance in the upright position could not be assessed due to logistical reasons not related to physiologic impairment. Laryngeal Penetration and Aspiration:  Neither penetration nor aspiration was observed in today's study with Pudding-thick, Nectar-thick,     Shallow flash penetration with large volume straw sips Thin. Current Respiratory Status   nasal canula         COMPENSATORY STRATEGIES       Compensatory strategies were not attempted      STRUCTURAL/FUNCTIONAL ANOMALIES       No structural/functional anomalies were noted    CERVICAL ESOPHAGEAL STAGE :        The cervical esophagus appeared adequate                                 The Speech Language Pathologist (SLP) completed education with the patient regarding results of evaluation. Explained that Speech Pathology intervention is warranted  at this time   Prognosis for improvements is fair +     This plan will be re-evaluated and revised in 1 week  if warranted.     Patient stated goals: Agreed with above,   Treatment goals discussed with Patient   The Patient did not demonstrate understanding of the diagnosis, prognosis and plan of care     CPT code:  86867  dysphagia study    Evaluation time includes  review of current medical information, gathering information on past medical history/social history and prior level of function, completion of standardized testing/informal observation of tasks, assessment of data, and development of POC/Goals. [x]The admitting diagnosis and active problem list, as listed below have been reviewed prior to initiation of this evaluation.      ADMITTING DIAGNOSIS: Septic shock ([facility] [A41.9, R65.21]     ACTIVE PROBLEM LIST:   Patient Active Problem List   Diagnosis    Pulmonary fibrosis ([facility]    Urinary retention    Dysthymia    Essential hypertension    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 ([facility]    History of falling    Enlarged prostate without lower urinary tract symptoms (luts)    Other constipation    Unspecified chronic bronchitis (HCC)    History of recurrent pneumonia    Tinea unguium    Pain in left toe(s)    Pain in toe of right foot    Difficulty walking    Moderate chronic obstructive pulmonary disease (HCC)    Chronic kidney disease    Wheezing    Dementia ([facility]    [facility] ([facility]       Nieves Bateman MSCCC/SLP  Speech Language Pathologist  LE-2005

## 2020-11-23 NOTE — PROGRESS NOTES
A  Sit to supine: Mod A  Min A   Functional Transfers Sit to stand: Max A  Stand to sit: Mod A  Min A   Functional Mobility Mod A with B HHA  For short side steps at EOB  Min A ww for in-room distance   Balance Sitting:     Static:  SBA    Dynamic: Min A  Standing: Mod A with B HHA     Endurance/Activity Tolerance Poor+  Fair+   Visual/  Perceptual Glasses: none in room              Hand dominance: R  UE ROM: RUE: impaired, shoulder flexion ~40*, impaired elbow extension, wrist and digits appear WFL, testing limited d/t impaired cognition    LUE:  impaired, shoulder flexion ~40*, impaired elbow extension, wrist and digits appear WFL, testing limited d/t impaired cognition    Strength: RUE: grossly 3+/5 LUE: grossly 3+/5   Strength: WFL  Fine Motor Coordination: WFL    Hearing: WFL  Sensation:  No c/o numbness or tingling  Tone: globally rigid  Edema: unremarkable                            Comments: RN approval obtained prior to intervention. Upon arrival patient semi supine with HOB slightly elevated. Grooming, UB dressing, cognition, and potential delirium addressed. Does report dizziness with positional change. Patient demo's desaturation to low to mid 80s with sit to supine, assisted into long sitting ASAP and saturation increased to above 90% within one minute. After session, patient long sitting with all devices within reach, all lines and tubes intact. Pt required cues and education as noted above for safe facilitation and completion of tasks. Therapist provided skilled monitoring of HR, O2, and patient's response during treatment session. Prior to and at the end of session, environmental modifications/line management completed for patients safety and efficiency of treatment session. Overall, patient demonstrates significant difficulties with completion of BADLs and IADLs.  Factors contributing to these difficulties include impaired cognition, limited BUE ROM and rigidity, O2 dependence, decreased endurance, and generalized weakness. As noted above, patient likely to benefit from further OT intervention to increase independence, safety, and overall quality of life. Treatment:   · Bed mobility: Facilitated bed mobility with cues for proper body mechanics and sequencing to prepare for ADL completion. · Functional transfers: Facilitated transfers from various surfaces with cues for body alignment, safety and hand placement. · ADL completion: Self-care retraining for the above-mentioned ADLs; training on proper hand placement, safety technique, sequencing, and energy conservation techniques. · Therapeutic Exercises: B UE AROM/AAROM completed at all levels to maintain strength/flexibility and to decrease edema/contractures to enhance ADL completion. · Postural Balance: Sitting and standing balance retraining to improve righting reactions with postural changes during ADLs. · Cognitive/Perceptual training: retraining exercises to improve attention, mentation, and spatial awareness for ADLs & transfers. · Skilled positioning: Proper positioning to improve interaction with environment, overall functioning and decrease/prevent edema and contractures. · Delirium Prevention/Management: Implementation of non-pharmacologic interventions for delirium prevention incorporated throughout session to patient. Including environmental and sensory modifications to decrease patient's internal distraction caused by delirium, and improve overall mentation. Eval Complexity:   · High Complexity  · History: Extensive review of medical records and additional review of physical, cognitive, or psychosocial history related to current functional performance  · Exam: 5+ performance deficits  · Assistance/Modification: Mod/max assistance or modifications required to perform tasks. May have comorbidities that affect occupational performance.     Assessment of current deficits   Functional mobility [x]  ADLs [x] Strength [x]  Cognition [x]  Functional transfers  [x] IADLs [x] Safety Awareness [x]  Endurance [x]  Fine Motor Coordination [x] Balance [x] Vision/perception [] Sensation []   Gross Motor Coordination [x] ROM [x] Delirium []                  Motor Control [x]    Plan of Care: 1-3 days/week for 1-2 weeks PRN   [x]ADL retraining/adapted techniques and AE recommendations to increase functional independence within precautions                    [x]Energy conservation techniques to improve tolerance for selfcare routine   [x]Functional transfer/mobility training/DME recommendations for increased independence, safety and fall prevention         [x]Patient/family education to increase safety and functional independence             [x]Environmental modifications for safe mobility and completion of ADLs                             [x]Cognitive retraining ex's to improve problem solving skills & safe participation in ADLs/IADLs     []Sensory re-education techniques to improve extremity awareness, maintain skin integrity and improve hand function                             []Visual/Perceptual retraining  to improve body awareness and safety during transfers and ADLs  []Splinting/positioning needs to maintain joint/skin integrity and prevent contractures  [x]Therapeutic activity to improve functional performance during ADLs. [x]Therapeutic exercise to improve tolerance and functional strength for ADLs   [x]Balance retraining/tolerance tasks for facilitation of postural control with dynamic challenges during ADLs .   [x]Neuromuscular re-education: facilitation of righting/equilibrium reactions, midline orientation, scapular stability/mobility, Normalization muscle     tone and facilitation active functional movement/Attention                         [x]Delirium prevention/treatment    []Positioning to improve functional independence  []Other:       Rehab Potential: Good for established goals

## 2020-11-23 NOTE — PROGRESS NOTES
Pharmacy Consultation Note  (Antibiotic Dosing and Monitoring)    Initial consult date: 11/19/2020  Consulting physician: Dr. Ian Kathleen  Drug(s): Vancomycin  Indication: Sepsis    Ht Readings from Last 1 Encounters:   11/21/20 5' 7\" (1.702 m)     Wt Readings from Last 1 Encounters:   11/22/20 156 lb 4.8 oz (70.9 kg)     Age/  Gender IBW DW  Allergy Information   80 y.o.   male 66.1 kg 71.2 kg  Patient has no known allergies. Date  Tmax WBC BUN/CR UOP  (mL/kg/hr) Drug/Dose Time   Given Level(s)   (Time) Comments   11/19  (#1) 101.8 24.7 63/2.6 -- Vancomycin 1750 mg IV x 1 2015 11/20  (#2) 99.7 39.7 51/2.1 -- Vancomycin 1,000 mg IV x 1 2053 Random level @ 1841 = 12.3 mcg/mL      11/21  (#3) afebrile 16.3 41/1.5 1 Vancomycin 1,000 mg IV x 1 2030 16.3 mcg/mL @ 9607    11/22  (#4) afebrile 11.5 29/1. 2 1 Vancomycin 1,000 mg IV Q36H --     11/23  (#5) afebrile 7.7 28/1.2 0.6 Vancomycin 1,000 mg IV Q36H 2213 11/24  (#6) afebrile 18.8 38/1.5 1.3 Vancomycin 1,000 mg IV Q36H --       (#7)             Estimated Creatinine Clearance: 39 mL/min (based on SCr of 1.2 mg/dL). UOP over the past 24 hours:       Intake/Output Summary (Last 24 hours) at 11/23/2020 1504  Last data filed at 11/23/2020 1427  Gross per 24 hour   Intake 218 ml   Output 550 ml   Net -332 ml       Other anti-infectives: Anti-infective Dose Date Initiated Date Stopped   Cefepime 2g IV q24hr 11/19    Doxycycline 100 mg IV q12hr 11/20      Cultures:  available culture and sensitivity results were reviewed in EPIC  Cultures sent and are pending.   Culture Date Result    Blood cx 1 11/19 NGTD   Blood cx 2 11/19 E coli   Urine cx 11/19 Mixed julianne   Respiratory panel, molecular 11/19 Not detected   Strep/legionella urine antigens 11/20 Positive for pneumococcal pneumonia  Legionella negative   Blood cx 1 11/21 NGTD   Blood cx 2 11/21 NGTD     Assessment:  · Consulted by Dr. Bharathi Potts to dose/monitor vancomycin  · Goal trough level:  15-20 mcg/mL  · Pt is a 80 yoM who presented from SNF with sepsis 2/2 pneumonia versus UTI.    · Serum creatinine today: 1.5 mg/dL; CrCl ~ 30-40 mL/min; baseline Scr ~ 1.2-1.3 mg/dL  · 11/20: Vancomycin 1750 mg IV x 1 given 11/19 @ 2015  · 11/21: Random AM level = 16.3 mcg/mL    Plan:  · Vancomycin 1,000 mg IV Q36H  · Check level prior to third or fourth dose  · Follow renal function  · Pharmacist will follow and monitor/adjust dosing as necessary      Thank you for the consult,    Aylin Briceno, LeahD, BCPS 11/23/2020 3:06 PM   Ext: [phone]

## 2020-11-23 NOTE — PROGRESS NOTES
Physical Therapy    9762/[id number]    Patient unavailable for physical therapy treatment due to low SpO2, no treatment recommended per RN.

## 2020-11-23 NOTE — DISCHARGE INSTR - COC
Continuity of Care Form    Patient Name: Josefa Hernandez   :  1931  MRN:  [id number]    [facility] date:  2020  Discharge date:  ***    Code Status Order: DNR-CCA   Advance Directives:     Admitting Physician:  Burt Hand DO  PCP: Catalina Schlatter, MD    Discharging Nurse: [facility] Unit/Room#: 7267/5561-23  Discharging Unit Phone Number: ***    Emergency Contact:   Extended Emergency Contact Information  Primary Emergency Contact: Rhoda SOLANO  Address: [address] [place] [address] Phone: [phone]  Relation: Legal Guardian  Secondary Emergency Contact: Cristel Nugent  Mobile Phone: [phone]  Relation: Other    Past Surgical History:  Past Surgical History:   Procedure Laterality Date    ABDOMEN SURGERY      APPENDECTOMY      CHOLECYSTECTOMY      COLONOSCOPY      ENDOSCOPY, COLON, DIAGNOSTIC      KIDNEY REMOVAL      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    TONSILLECTOMY         Immunization History:   Immunization History   Administered Date(s) Administered    Influenza Virus Vaccine 10/09/2013, 10/07/2014, 10/14/2015, 10/03/2016, 10/13/2017, 2018    Influenza, Quadv, adjuvanted, 65 yrs +, IM, PF (Fluad) 10/30/2020    Influenza, Triv, inactivated, subunit, adjuvanted, IM (Fluad 65 yrs and older) 10/07/2019    Pneumococcal Conjugate 13-valent ([id number]) 2019    Pneumococcal Polysaccharide (Ieeclammw14) 2013       Active Problems:  Patient Active Problem List   Diagnosis Code    Pulmonary fibrosis ([facility] [address] J84.10    Urinary retention R33.9    Dysthymia F34.1    Essential hypertension I10    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 ([address] F03.91, F05    History of falling Z91.81    Enlarged prostate without lower urinary tract symptoms (luts) N40.0    Other constipation K59.09    Unspecified chronic bronchitis (HCC) J42    History of recurrent pneumonia Z87.01    Tinea unguium B35.1    1300   Dressing/Treatment Zinc paste 20   Wound Assessment Pink/red 20   Drainage Amount Scant 20   Drainage Description Sanguinous 20   Number of days: 2        Elimination:  Continence:   · Bowel: {YES / VD:69422}  · Bladder: {YES / QN:30149}  Urinary Catheter: {Urinary Catheter:160869947}   Colostomy/Ileostomy/Ileal Conduit: {YES / TU:05793}       Date of Last BM: ***    Intake/Output Summary (Last 24 hours) at 2020 1495  Last data filed at 2020 3548  Gross per 24 hour   Intake 2807.69 ml   Output 1100 ml   Net 1707.69 ml     I/O last 3 completed shifts: In: 2807.7 [I.V.:2657.7;  IV Piggyback:150]  Out: 1100 [Urine:1100]    Safety Concerns:     { JOLENE Safety Concerns:532666094}    Impairments/Disabilities:      {[facility] [place]ments/Disabilities:916175117}    Nutrition Therapy:  Current Nutrition Therapy:   [address] [facility] Diet List:390964709}    Routes of Feeding: {[facility] DME Other Feedings:715252557}  Liquids: {Slp liquid thickness:70328}  Daily Fluid Restriction: {[facility] DME Yes amt example:666957014}  Last Modified Barium Swallow with Video (Video Swallowing Test): {Done Not Done TQSR:155850640}    Treatments at the Time of Hospital Discharge:   Respiratory Treatments: ***  Oxygen Therapy:  {Therapy; copd oxygen:09322}  Ventilator:    { CC Vent QLLR:153588737}    Rehab Therapies: {THERAPEUTIC INTERVENTION:4630318486}  Weight Bearing Status/Restrictions: [facility] Weight Bearin}  Other Medical Equipment (for information only, NOT a DME order):  {EQUIPMENT:231685034}  Other Treatments: ***    Patient's personal belongings (please select all that are sent with patient):  {[facility] DME Belongings:924133149}    RN SIGNATURE:  {Esignature:764846067}    CASE MANAGEMENT/SOCIAL WORK SECTION    Inpatient Status Date: 2020     Readmission Risk Assessment Score:  Readmission Risk              Risk of Unplanned Readmission:        26           Discharging to Facility/ Agency   · Name:  Rajeev Langston   Address:    [address] [place]    / signature: Electronically signed by Paulo Perez RN-BC on 11/23/2020 at 9:24 AM      PHYSICIAN SECTION    Prognosis: {Prognosis:2101648791}    Condition at Discharge: [address] Patient Condition:793452941}    Rehab Potential (if transferring to Rehab): {Prognosis:1386482738}    Recommended Labs or Other Treatments After Discharge: ***    Physician Certification: I certify the above information and transfer of Brian Good  is necessary for the continuing treatment of the diagnosis listed and that he requires [facility] for less 30 days.      Update Admission H&P: {CHP DME Changes in BESVB:358636815}    PHYSICIAN SIGNATURE:  {Esignature:345828845}

## 2020-11-23 NOTE — PROGRESS NOTES
Warm and dry. No rashes were noted. HEENT:   Round and reactive pupils. AT/NC right eye red  Neck:    Supple to movements. Chest:   No use of accessory muscles to breathe. Symmetrical expansion. Diminished - on nasal canula. Cardiovascular:  S1 and S2 are rhythmic and regular. Abdomen:   Positive bowel sounds to auscultation. Benign to palpation. Extremities:   No clubbing, no cyanosis, min edema.   CNS    AAxO   Lines: pIV  Rdz- yellow urine     Radiology:  Laboratory and Tests Review:  Lab Results   Component Value Date    WBC 7.7 11/23/2020    WBC 11.5 11/22/2020    WBC 16.3 (H) 11/21/2020    HGB 8.4 (L) 11/23/2020    HCT 26.6 (L) 11/23/2020    MCV 98.5 11/23/2020     11/23/2020     No results found for: CRP  Lab Results   Component Value Date    ALT 68 (H) 11/23/2020    AST 50 (H) 11/23/2020    ALKPHOS 246 (H) 11/23/2020    BILITOT 0.4 11/23/2020     Lab Results   Component Value Date     11/23/2020    K 3.9 11/23/2020    K 5.0 04/14/2019     11/23/2020    CO2 23 11/23/2020    BUN 28 11/23/2020    CREATININE 1.2 11/23/2020    CREATININE 1.2 11/22/2020    CREATININE 1.4 11/21/2020    GFRAA >60 11/23/2020    LABGLOM 57 11/23/2020    GLUCOSE 106 11/23/2020    PROT 5.4 11/23/2020    LABALBU 2.3 11/23/2020    CALCIUM 7.4 11/23/2020    BILITOT 0.4 11/23/2020    ALKPHOS 246 11/23/2020    AST 50 11/23/2020    ALT 68 11/23/2020     No results found for: CRP  Lab Results   Component Value Date    SEDRATE 25 (H) 03/25/2019       Microbiology:   Recent Labs     11/21/20  1810   COVID19 Non-Reactive     Lab Results   Component Value Date    BLOODCULT2 24 Hours no growth 11/21/2020    BLOODCULT2  11/19/2020     Gram stain performed from blood culture bottle media  Gram negative rods      BLOODCULT2 Identification and sensitivity to follow 11/19/2020    ORG Escherichia coli 11/19/2020    ORG Escherichia coli 04/15/2019    ORG FILM ARR Respiratory syncitial virus Detected 06/07/2016     No results for input(s): CXSURG, ORG in the last 72 hours. No results for input(s): WNDABS, ORG in the last 72 hours. No results for input(s): Mariya Mcguire in the last 72 hours. ASSESSMENT:  · Sepsis  · Gn sepsis  · S. pneumoniae pneumonia   · Leukocytosis- improved   · santhosh better uti  · hypernatremia      Plan:   · On cefepime - will change to meropenem-  · On vancomycin (VANCOCIN) intermittent dosing (placeholder), RX Placeholder   · On doxycycline (VIBRAMYCIN) 100 mg in dextrose 5 % 100 mL IVPB, Q12H  · On solu-cortef   · Monitor labs-- lfts are improving   · Check chest x-ray- repeat COVID test     Imaging and labs were reviewed per medical records. Thank you for involving me in the care of Ayah Baker will continue to follow. Please do not hesitate to call for any questions or concerns. Electronically signed by YUSUF Aquino on 11/23/2020 at 3:31 PM   As above    This is a face to face encounter with Evan Avelar on 11/23/20. I have performed and participated in the history, exam, medical decision making, and  POC  with the NURSE PRACTITIONER and provided the instruction and education regarding this patient's care. Imaging and labs were reviewed per medical records and any ID pertinent labs were addressed with the patient. Seen pt post swallow he has coarse bs and tachypnea  ? aspiration  On rx for E coli septicemia   POSITIVE for Pneumococcal pneumonia. Wbc better  santhosh better  Meropenem due to poss aspiration    Vanc.doxy will narrow in am   Thank you for involving me in the care of Evan Avelar. Please do not hesitate to call for any questions or concerns.     Electronically signed by Joie Kapoor MD on 11/23/2020 at 7:39 PM    Phone [phone]  Fax [phone] no indicators present

## 2020-11-24 NOTE — PROGRESS NOTES
mcg/mL  · Pt is a 80 yoM who presented from SNF with sepsis 2/2 pneumonia versus UTI.    · Serum creatinine today: 1.5 mg/dL; CrCl ~ 30-40 mL/min; baseline Scr ~ 1.2-1.3 mg/dL  · 11/20: Vancomycin 1750 mg IV x 1 given 11/19 @ 2015  · 11/21: Random AM level = 16.3 mcg/mL    Plan:  · Vancomycin 1,000 mg IV Q36H  · Check level prior to third or fourth dose  · Follow renal function  · Pharmacist will follow and monitor/adjust dosing as necessary      Thank you for the consult,    Shelah Barthel, PharmD, BCPS 11/24/2020 3:17 PM   Ext: [phone]

## 2020-11-24 NOTE — PLAN OF CARE
Problem: Infection, Septic Shock:  Goal: Will show no infection signs and symptoms  Description: Will show no infection signs and symptoms  Outcome: Met This Shift

## 2020-11-24 NOTE — PROGRESS NOTES
2200, noted patient status declining, respiratory and nursing supervisor at bedside, called on call physician, soon at bedside as well, New Orders noted, fluids stopped, bipap settings changed, patient resting, comfortable, with better saturation and vitals noted

## 2020-11-24 NOTE — PROGRESS NOTES
Nephrology Progress Note  Patient's Name: Nury Walsh    Reason for Consult:  Hypernatremia and TUNG  Requesting Physician:  Freddie Covington MD    Chief Complaint:  syncope    History Obtained From:  past medical records    History of Present Ilness:   FROM 11-21  Nuyr Walsh is a 80 y.o. male with prior history of dementia, copd, depression, htn, CKD G3A . Baseline serum cr 1.3mg/dl with an e-GFR=52ml/min who presented last night from a nursing home for syncope. He was hypotensive. He was found to be in septic shock with possible pneumonia and uti. He was started on doxy and vanco. He has been started on ivf and solucortef. Labs show na 159>154, co2 24, bun 47, cr 2.1>1.9, ca 7.5, alb 2.5, hgb 9.5, plt 205, fena <1. Ct shows no hydro. 11/24: seen and examined. Limited history and ROS due to dementia. Events since last evaluation noted. There is concern for aspiration. He is sitting up in bed, on bipap; he is tachycardic and tachypneic. Appears significantly worse today         Past Medical History:   Diagnosis Date    Chronic kidney disease     COPD (chronic obstructive pulmonary disease) ([address]     Dementia ([address]     Depression     Hypertension     Neuromuscular disorder ([address]     Other disorders of kidney and ureter in diseases classified elsewhere     Pneumonia     [facility]osis 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 He quit after 25.00 years of use. He has never used smokeless tobacco. He reports that he does not drink alcohol or use drugs. Allergies:  Patient has no known allergies.     Current Medications:    albuterol (PROVENTIL) nebulizer solution 2.5 mg, Q4H PRN  acetylcysteine (MUCOMYST) 10 % solution 400 mg, Q4H  albuterol (PROVENTIL) nebulizer solution 2.5 mg, Q4H  pantoprazole (PROTONIX) injection 40 mg, Daily    And  sodium chloride (PF) 0.9 % injection 10 mL, Daily  hydrocortisone sodium succinate PF (SOLU-CORTEF) injection 50 mg, BID  [Held by provider] pantoprazole (PROTONIX) tablet 40 mg, QAM AC  [Held by provider] 0.9% NaCl with KCl 40 mEq infusion, Continuous  albumin human 25 % IV solution 50 g, Q8H  Vitamin D (CHOLECALCIFEROL) tablet 1,000 Units, Daily  meropenem (MERREM) 1 g in sodium chloride 0.9 % 100 mL IVPB (mini-bag), Q8H  midodrine (PROAMATINE) tablet 10 mg, TID WC  vancomycin 1000 mg IVPB in 250 mL D5W addavial, Q36H  aspirin EC tablet 81 mg, Daily  sodium chloride flush 0.9 % injection 10 mL, 2 times per day  sodium chloride flush 0.9 % injection 10 mL, PRN  acetaminophen (TYLENOL) tablet 650 mg, Q6H PRN    Or  acetaminophen (TYLENOL) suppository 650 mg, Q6H PRN  polyethylene glycol (GLYCOLAX) packet 17 g, Daily PRN  glucose (GLUTOSE) 40 % oral gel 15 g, PRN  dextrose 50 % IV solution, PRN  glucagon (rDNA) injection 1 mg, PRN  dextrose 5 % solution, PRN  heparin (porcine) injection 5,000 Units, 3 times per day  perflutren lipid microspheres (DEFINITY) injection 1.65 mg, ONCE PRN  doxycycline (VIBRAMYCIN) 100 mg in dextrose 5 % 100 mL IVPB, Q12H        Review of Systems:   Review of systems not obtained due to patient factors.     Physical exam:   Constitutional:  Elderly male in NAD  Vitals:   VITALS:  /64   Pulse 108   Temp 97 °F (36.1 °C) (Axillary)   Resp 24   Ht 5' 7\" (1.702 m)   Wt 160 lb 8 oz (72.8 kg)   SpO2 96%   BMI 25.14 kg/m²   24HR INTAKE/OUTPUT:      Intake/Output Summary (Last 24 hours) at 11/24/2020 1521  Last data filed at 11/24/2020 1433  Gross per 24 hour   Intake 100 ml   Output 1950 ml   Net -1850 ml       Constitutional: Patient in mild distress  Head: normocephalic, atraumatic  Cardiovascular: regular rate and rhythm  Respiratory: rhonchi throughout   Gastrointestinal: soft, nontender, nondistended  Ext: no edema  Neuro: awake  Skin: dry, no rash   : +mccray          Data:   Labs:  CBC:   Lab Results   Component Value Date    WBC 18.8 11/24/2020    RBC 2.66 11/24/2020    HGB 8.4 11/24/2020    HCT 26.9 11/24/2020    .1 11/24/2020    MCH 31.6 11/24/2020    MCHC 31.2 11/24/2020    RDW 14.5 11/24/2020     11/24/2020    MPV 11.7 11/24/2020     CBC with Differential:    Lab Results   Component Value Date    WBC 18.8 11/24/2020    RBC 2.66 11/24/2020    HGB 8.4 11/24/2020    HCT 26.9 11/24/2020     11/24/2020    .1 11/24/2020    MCH 31.6 11/24/2020    MCHC 31.2 11/24/2020    RDW 14.5 11/24/2020    SEGSPCT 53 10/12/2013    METASPCT 0.9 11/24/2020    LYMPHOPCT 6.1 11/24/2020    MONOPCT 6.1 11/24/2020    MYELOPCT 1.7 11/24/2020    EOSPCT 8.7 10/06/2020    BASOPCT 0.2 11/24/2020    MONOSABS 1.13 11/24/2020    LYMPHSABS 1.13 11/24/2020    EOSABS 0.00 11/24/2020    BASOSABS 0.00 11/24/2020     Hemoglobin/Hematocrit:    Lab Results   Component Value Date    HGB 8.4 11/24/2020    HCT 26.9 11/24/2020     CMP:    Lab Results   Component Value Date     11/24/2020    K 4.2 11/24/2020    K 5.0 04/14/2019     11/24/2020    CO2 25 11/24/2020    BUN 38 11/24/2020    CREATININE 1.5 11/24/2020    GFRAA 53 11/24/2020    LABGLOM 44 11/24/2020    GLUCOSE 107 11/24/2020    PROT 6.9 11/24/2020    LABALBU 4.5 11/24/2020    CALCIUM 8.3 11/24/2020    BILITOT 0.9 11/24/2020    ALKPHOS 183 11/24/2020    AST 56 11/24/2020    ALT 55 11/24/2020     BMP:    Lab Results   Component Value Date     11/24/2020    K 4.2 11/24/2020    K 5.0 04/14/2019     11/24/2020    CO2 25 11/24/2020    BUN 38 11/24/2020    LABALBU 4.5 11/24/2020    CREATININE 1.5 11/24/2020    CALCIUM 8.3 11/24/2020    GFRAA 53 11/24/2020    LABGLOM 44 11/24/2020    GLUCOSE 107 11/24/2020     Sodium:    Lab Results   Component Value Date    NA 144 11/24/2020     BUN/Creatinine:    Lab Results   Component Value Date    BUN 38 11/24/2020    CREATININE 1.5 11/24/2020     Hepatic Function Panel:    Lab Results   Component Value Date    ALKPHOS 183 11/24/2020    ALT 55 11/24/2020    AST 56 11/24/2020    PROT 6.9 11/24/2020    BILITOT 0.9 11/24/2020    BILIDIR 0.4 11/20/2020    IBILI 0.2 11/20/2020    LABALBU 4.5 11/24/2020     Calcium:    Lab Results   Component Value Date    CALCIUM 8.3 11/24/2020     Ionized Calcium:  No results found for: IONCA  Magnesium:    Lab Results   Component Value Date    MG 2.1 11/24/2020     Phosphorus:    Lab Results   Component Value Date    PHOS 6.3 11/24/2020     LDH:  No results found for: LDH  Uric Acid:  No results found for: LABURIC, URICACID  PT/INR:    Lab Results   Component Value Date    PROTIME 15.9 11/19/2020    INR 1.4 11/19/2020     PTT:    Lab Results   Component Value Date    APTT 21.9 11/19/2020   [APTT}  Troponin:    Lab Results   Component Value Date    TROPONINI 0.24 11/24/2020     U/A:    Lab Results   Component Value Date    COLORU DIANNA 11/19/2020    PROTEINU >=300 11/19/2020    PHUR 8.5 11/19/2020    WBCUA 0-1 11/19/2020    RBCUA >20 11/19/2020    RBCUA PACKED 10/08/2013    BACTERIA RARE 11/19/2020    CLARITYU CLOUDY 11/19/2020    SPECGRAV 1.010 11/19/2020    LEUKOCYTESUR SMALL 11/19/2020    UROBILINOGEN 2.0 11/19/2020    BILIRUBINUR SMALL 11/19/2020    BILIRUBINUR Negative 11/09/2020    BLOODU LARGE 11/19/2020    GLUCOSEU Negative 11/19/2020    AMORPHOUS MODERATE 11/19/2020     ABG:    Lab Results   Component Value Date    PH 7.127 11/24/2020    PCO2 68.3 11/24/2020    PO2 95.2 11/24/2020    HCO3 22.1 11/24/2020    BE -7.4 11/24/2020    O2SAT 94.6 11/24/2020     HgBA1c:    Lab Results   Component Value Date    LABA1C 6.0 11/20/2020     Microalbumen/Creatinine ratio:  No components found for: RUCREAT  FLP:    Lab Results   Component Value Date    TRIG 111 03/25/2019    HDL 30 03/25/2019    LDLCALC 64 03/25/2019    LABVLDL 22 03/25/2019     TSH:    Lab Results   Component Value Date    TSH 2.520 11/20/2020     VITAMIN B12: No components found for: B12  FOLATE:    Lab Results   Component Value Date    FOLATE 7.8 11/22/2020     Iron Saturation:  No components found for: PERCENTFE  FERRITIN:  No results found for: FERRITIN  AMYLASE:  No results found for: AMYLASE  LIPASE:  No results found for: LIPASE  24 Hour Urine for Protein:  No components found for: Isis Clive, NLPH45KS, UTV3  24 Hour Urine for Creatinine Clearance:  No components found for: CREAT4, UHRS10, UTV10  PSA:   Lab Results   Component Value Date    PSA 1.38 03/24/2019 11/20/20 2 D ECHO:   Summary    Left ventricular size is grossly normal.    Mild left ventricular concentric hypertrophy noted.    Ejection fraction is measured at 82%.    No evidence of left ventricular mass or thrombus noted.    No regional wall motion abnormalities seen.    Physiologic and/or trace mitral regurgitation is present.    No evidence of mitral valve stenosis.    Aortic valve opens well.    The aortic valve is trileaflet.    No hemodynamically significant aortic stenosis is present.    Physiologic and/or trace aortic regurgitation is noted.    Pulmonary hypertension is mild .    Physiologic and/or trace tricuspid regurgitation.    Regular rhythm. Imaging:  CXR results:  EXAMINATION:    ONE XRAY VIEW OF THE CHEST    11/19/2020 7:19 pm    COMPARISON:    June 7, 2016    HISTORY:    ORDERING SYSTEM PROVIDED HISTORY: Shortness of breath    TECHNOLOGIST PROVIDED HISTORY:    Reason for exam:->Shortness of breath    FINDINGS:    Increased bilateral lung opacities.  Cardiomegaly.  Atherosclerotic    calcification of the thoracic aorta.         Impression    Increased bilateral lung opacities are suspicious for multifocal pneumonia.       EXAMINATION:    CT OF THE ABDOMEN AND PELVIS WITHOUT CONTRAST 11/19/2020 8:30 pm    TECHNIQUE:    CT of the abdomen and pelvis was performed without the administration of    intravenous contrast. Multiplanar reformatted images are provided for review. Dose modulation, iterative reconstruction, and/or weight based adjustment of    the mA/kV was utilized to reduce the radiation dose to as low as reasonably    achievable. COMPARISON:    None. HISTORY:    ORDERING SYSTEM PROVIDED HISTORY: septic concern for intraabdominal pathology    TECHNOLOGIST PROVIDED HISTORY:    Reason for exam:->septic concern for intraabdominal pathology    Additional Contrast?->None    FINDINGS:    Lower Chest: Extensive interstitial markings and patchy opacities at the    visualized lung bases.  No large pleural effusions.  The heart is mildly    enlarged with no pericardial effusion. Organs: The liver spleen, pancreas and adrenal glands appear unremarkable on    this non IV contrast scan.  The kidneys appear slightly atrophic with no    evidence of renal stones or hydronephrosis.  The gallbladder is surgically    absent. GI/Bowel: No evidence of obstructing or constricting mass lesions. Edilma Autumn is    sigmoid diverticulosis with no evidence of diverticulitis. Pelvis: The bladder is collapsed with Rdz catheter in place there are    pockets of gas in the bladder which may have been introduced during    catheterization.  No significant adenopathy or ascites noted. Peritoneum/Retroperitoneum: No significant ascites or adenopathy.  No free    air.  Abdominal aorta shows atherosclerotic calcification with no evidence of    abdominal aortic aneurysm.  No periaortic fluid collection. Bones/Soft Tissues: No lytic or blastic bony lesions.  Degenerative changes    in the spine.         Impression    No evidence of acute intra-abdominal process, status post cholecystectomy. Sigmoid diverticulosis. Infiltrates at the visualized lung bases. Assessment  1.  Stage I TUNG on ckd 3a  Cr 1/2020 was 1.3 with gfr of 52ml/min  Presumed sec to decreased effective renal perfusion in the setting of the sepsis with hypotension and the free water deficit  No hydro on mayra  UA Bili small Blood large, protein>300mg/dl, small LE,  fena <1    Renal function worse today with decreased intake, diuretics; continue to follow  Worsening respiratory status is likely multifactorial but will stop IVF        2. Hypernatremia-  With decreased free water and likely decreased concentrating capacity  Improved  Follow     3. Hypotension  With sepsis  Improved    4. Hypocalcemia  In setting of hypoalbuminemia  Ionized Ca++ 1.17 WNL  PTH elevated at 129, Vit D low at 14  Started  vitamin D    5. 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  Respiratory failure  Possible due to aspiration  Given current respiratory status, will give additional dose of IV diuretic             Kimberly Solis MD

## 2020-11-24 NOTE — CARE COORDINATION
SS NOTE: COVID NEGATIVE 11/23. This pt has been accepted to [address] [place] [address] for a SNF placement after dch. PT will need no PRECERT or 3-day stay. Pt will need a signed JOLENE and current PT/OT notes. SW completed the HENs. Angelica Diamond. 11/24/2020.11:43 AM.

## 2020-11-24 NOTE — PROGRESS NOTES
[facility]  [facility]  Progress Note    NAME:Chris Foley  1931  DATE OF SERVICE:20    FACE TO FACE ENCOUNTER 20  ID FOLLOWING FOR atbx sepsis    SUBJECTIVE:  Chief Complaint   Patient presents with    Loss of Consciousness     Syncope without colapse on toilet. Pt A&O x0 @ baseline. hematuria - was treated with levequin for UTI. New ABX for UTI was to start today. Poss change in mentation?     presented on admission  Today:   In bed - does not look well today  On bipap- ++ accessory muscles   Patient is tolerating medications. No reported adverse drug reactions. Review of systems:  As stated above in the chief complaint, otherwise negative.     Medications:  Scheduled Meds:   acetylcysteine  4 mL Inhalation Q4H    albuterol  2.5 mg Nebulization Q4H    pantoprazole  40 mg Intravenous Daily    And    sodium chloride (PF)  10 mL Intravenous Daily    hydrocortisone sodium succinate PF  50 mg Intravenous BID    [Held by provider] pantoprazole  40 mg Oral QAM AC    albumin human  50 g Intravenous Q8H    vitamin D3  1,000 Units Oral Daily    meropenem  1 g Intravenous Q8H    midodrine  10 mg Oral TID WC    vancomycin  1,000 mg Intravenous Q36H    aspirin  81 mg Oral Daily    sodium chloride flush  10 mL Intravenous 2 times per day    heparin (porcine)  5,000 Units Subcutaneous 3 times per day    doxycycline (VIBRAMYCIN) IV  100 mg Intravenous Q12H     Continuous Infusions:   [Held by provider] 0.9% NaCl with KCl 40 mEq Stopped (20 2303)    dextrose       PRN Meds:albuterol, sodium chloride flush, acetaminophen **OR** acetaminophen, polyethylene glycol, glucose, dextrose, glucagon (rDNA), dextrose, perflutren lipid microspheres    OBJECTIVE:  /80   Pulse 114   Temp 97 °F (36.1 °C) (Axillary)   Resp 28   Ht 5' 7\" (1.702 m)   Wt 160 lb 8 oz (72.8 kg)   SpO2 96%   BMI 25.14 kg/m²   Temp  Av.4 °F (36.3 °C)  Min: 97 °F (36.1 °C) Max: 98.9 °F (37.2 °C)  Constitutional:  The patient is with eyes open- slow to respond- does not follow commands. very dyspneic/ tachypnic   Skin:    Warm and dry. No rashes were noted. HEENT:   Round and reactive pupils. AT/NC right eye red  Neck:    Supple to movements. Chest:   ++ respiratory distress- on bipap ++ accessory muscle use. Coarse . Cardiovascular:  Tachy   Abdomen:   Positive bowel sounds to auscultation. Benign to palpation. Extremities:   No clubbing, no cyanosis, min edema.   CNS    AAxO   Lines: pIV  Rdz- yellow urine     Radiology:  Laboratory and Tests Review:  Lab Results   Component Value Date    WBC 18.8 (H) 11/24/2020    WBC 7.7 11/23/2020    WBC 11.5 11/22/2020    HGB 8.4 (L) 11/24/2020    HCT 26.9 (L) 11/24/2020    .1 (H) 11/24/2020     11/24/2020     No results found for: CRP  Lab Results   Component Value Date    ALT 55 (H) 11/24/2020    AST 56 (H) 11/24/2020    ALKPHOS 183 (H) 11/24/2020    BILITOT 0.9 11/24/2020     Lab Results   Component Value Date     11/24/2020    K 4.2 11/24/2020    K 5.0 04/14/2019     11/24/2020    CO2 25 11/24/2020    BUN 38 11/24/2020    CREATININE 1.5 11/24/2020    CREATININE 1.2 11/23/2020    CREATININE 1.2 11/22/2020    GFRAA 53 11/24/2020    LABGLOM 44 11/24/2020    GLUCOSE 107 11/24/2020    PROT 6.9 11/24/2020    LABALBU 4.5 11/24/2020    CALCIUM 8.3 11/24/2020    BILITOT 0.9 11/24/2020    ALKPHOS 183 11/24/2020    AST 56 11/24/2020    ALT 55 11/24/2020     No results found for: CRP  Lab Results   Component Value Date    SEDRATE 25 (H) 03/25/2019       Microbiology:   Recent Labs     11/23/20  1555   COVID19 Not Detected     Lab Results   Component Value Date    BLOODCULT2 24 Hours no growth 11/21/2020    BLOODCULT2  11/19/2020     Gram stain performed from blood culture bottle media  Gram negative rods      BLOODCULT2 Identification and sensitivity to follow 11/19/2020    ORG Escherichia coli 11/19/2020    ORG Escherichia coli 04/15/2019    ORG FILM ARR Respiratory syncitial virus Detected 06/07/2016     No results for input(s): CXSURG, ORG in the last 72 hours. No results for input(s): WNDABS, ORG in the last 72 hours. No results for input(s): [place] in the last 72 hours. ASSESSMENT:  · Sepsis  · Gn sepsis  · S. pneumoniae pneumonia   · Leukocytosis- improved   · santhosh - creat 1.5  · uti  · hypernatremia      Plan:   · Continue meropenem   · On vancomycin (VANCOCIN) intermittent dosing (placeholder), RX Placeholder   · On doxycycline (VIBRAMYCIN) 100 mg in dextrose 5 % 100 mL IVPB, Q12H  · On solu-cortef   · Monitor labs-- lfts are improving creat- 1.5- monitor- may need to adjust atbs according to creat- will check again in am.   · Check chest x-ray-   · repeat COVID test - was negative- will check antibodies   · Had ABGs drawn today- PH- 7.127- patient is on bipap- ++ accessory muscle use- in distress. · He is an DNR- [facility]    Imaging and labs were reviewed per medical records. Thank you for involving me in the care of Consuelo Garcia will continue to follow. Please do not hesitate to call for any questions or concerns. Electronically signed by YUSUF Gar on 11/24/2020 at 10:55 AM    As above    This is a face to face encounter with Misa Garces on 11/24/20. I have performed and participated in the history, exam, medical decision making, and  POC  with the NURSE PRACTITIONER and provided the instruction and education regarding this patient's care. Imaging and labs were reviewed per medical records and any ID pertinent labs were addressed with the patient. The patient was educated about the diagnosis, prognosis, indications, risks and benefits of treatment. Pt had the opportunity to ask questions. All questions were answered.   PT IS ON BIPAP  RESP DISTRESS B EXTENSIVE INFILTRATES  COARSE BS  AFEBRILE  CR1.5  WBC18.8  E. COLI SEPSIS  -MEROPENEM  VANCO  IF DIARRHEA NAZIA GAN      Thank you for involving me in the care of [employer]. Please do not hesitate to call for any questions or concerns.     Electronically signed by Marleny Garcia MD on 11/24/2020 at 3:56 PM    Phone [phone]  Fax [phone]

## 2020-11-24 NOTE — PROGRESS NOTES
CRITICAL CARE PROGRESS NOTE    80year old man with PMH as described below admitted to ICU for management of septic shock, pneumonia, urinary tract infection, dehydration, metabolic acidosis, metabolic encephalopathy. --Worsening hypoxemia now requiring NIV, chest XR with worsening diffuse alveolar infiltrates  --Has metabolic acidosis and TUNG, receiving diuresis today  --he is DNR-CCA    Recent Labs     11/24/20  0500      K 4.2   *   CO2 25   BUN 38*   CREATININE 1.5*   GLUCOSE 107*   CALCIUM 8.3*     Recent Labs     11/24/20  0500   WBC 18.8*   RBC 2.66*   HGB 8.4*   HCT 26.9*   .1*   MCH 31.6   MCHC 31.2*   RDW 14.5      MPV 11.7     No results for input(s): BC in the last 72 hours. No results for input(s): Jay Jose in the last 72 hours.   24 HR INTAKE/OUTPUT:      Intake/Output Summary (Last 24 hours) at 11/24/2020 1456  Last data filed at 11/24/2020 1433  Gross per 24 hour   Intake 100 ml   Output 1950 ml   Net -1850 ml     MEDICATIONS:   acetylcysteine  4 mL Inhalation Q4H    albuterol  2.5 mg Nebulization Q4H    pantoprazole  40 mg Intravenous Daily    And    sodium chloride (PF)  10 mL Intravenous Daily    hydrocortisone sodium succinate PF  50 mg Intravenous BID    [Held by provider] pantoprazole  40 mg Oral QAM AC    albumin human  50 g Intravenous Q8H    vitamin D3  1,000 Units Oral Daily    meropenem  1 g Intravenous Q8H    midodrine  10 mg Oral TID WC    vancomycin  1,000 mg Intravenous Q36H    aspirin  81 mg Oral Daily    sodium chloride flush  10 mL Intravenous 2 times per day    heparin (porcine)  5,000 Units Subcutaneous 3 times per day    doxycycline (VIBRAMYCIN) IV  100 mg Intravenous Q12H      [Held by provider] 0.9% NaCl with KCl 40 mEq Stopped (11/23/20 2303)    dextrose       albuterol, sodium chloride flush, acetaminophen **OR** acetaminophen, polyethylene glycol, glucose, dextrose, glucagon (rDNA), dextrose, perflutren lipid microspheres    OBJECTIVE:  Vitals:    11/24/20 1226   BP: 106/64   Pulse: 108   Resp: 24   Temp: 97 °F (36.1 °C)   SpO2: 96%     FiO2 : 100 %  O2 Flow Rate (L/min): 15 L/min  O2 Device: PAP (positive airway pressure)        LABS:  WBC   Date Value Ref Range Status   11/24/2020 18.8 (H) 4.5 - 11.5 E9/L Final   11/23/2020 7.7 4.5 - 11.5 E9/L Final   11/22/2020 11.5 4.5 - 11.5 E9/L Final     Hemoglobin   Date Value Ref Range Status   11/24/2020 8.4 (L) 12.5 - 16.5 g/dL Final   11/23/2020 8.4 (L) 12.5 - 16.5 g/dL Final   11/22/2020 8.9 (L) 12.5 - 16.5 g/dL Final     Hematocrit   Date Value Ref Range Status   11/24/2020 26.9 (L) 37.0 - 54.0 % Final   11/23/2020 26.6 (L) 37.0 - 54.0 % Final   11/22/2020 29.9 (L) 37.0 - 54.0 % Final     MCV   Date Value Ref Range Status   11/24/2020 101.1 (H) 80.0 - 99.9 fL Final   11/23/2020 98.5 80.0 - 99.9 fL Final   11/22/2020 102.4 (H) 80.0 - 99.9 fL Final     Platelets   Date Value Ref Range Status   11/24/2020 142 130 - 450 E9/L Final   11/23/2020 155 130 - 450 E9/L Final   11/22/2020 153 130 - 450 E9/L Final     Sodium   Date Value Ref Range Status   11/24/2020 144 132 - 146 mmol/L Final   11/23/2020 141 132 - 146 mmol/L Final   11/22/2020 143 132 - 146 mmol/L Final     Potassium   Date Value Ref Range Status   11/24/2020 4.2 3.5 - 5.0 mmol/L Final   11/23/2020 3.9 3.5 - 5.0 mmol/L Final   11/22/2020 3.8 3.5 - 5.0 mmol/L Final     Potassium reflex Magnesium   Date Value Ref Range Status   04/14/2019 5.0 3.5 - 5.0 mmol/L Final     Chloride   Date Value Ref Range Status   11/24/2020 108 (H) 98 - 107 mmol/L Final   11/23/2020 111 (H) 98 - 107 mmol/L Final   11/22/2020 112 (H) 98 - 107 mmol/L Final     CO2   Date Value Ref Range Status   11/24/2020 25 22 - 29 mmol/L Final   11/23/2020 23 22 - 29 mmol/L Final   11/22/2020 23 22 - 29 mmol/L Final     BUN   Date Value Ref Range Status   11/24/2020 38 (H) 8 - 23 mg/dL Final   11/23/2020 28 (H) 8 - 23 mg/dL Final   11/22/2020 29 (H) 8 - 23 mg/dL Final     CREATININE   Date Value Ref Range Status   11/24/2020 1.5 (H) 0.7 - 1.2 mg/dL Final   11/23/2020 1.2 0.7 - 1.2 mg/dL Final   11/22/2020 1.2 0.7 - 1.2 mg/dL Final     Glucose   Date Value Ref Range Status   11/24/2020 107 (H) 74 - 99 mg/dL Final   11/23/2020 106 (H) 74 - 99 mg/dL Final   11/22/2020 133 (H) 74 - 99 mg/dL Final     Calcium   Date Value Ref Range Status   11/24/2020 8.3 (L) 8.6 - 10.2 mg/dL Final   11/23/2020 7.4 (L) 8.6 - 10.2 mg/dL Final   11/22/2020 7.6 (L) 8.6 - 10.2 mg/dL Final     Total Protein   Date Value Ref Range Status   11/24/2020 6.9 6.4 - 8.3 g/dL Final   11/23/2020 5.4 (L) 6.4 - 8.3 g/dL Final   11/22/2020 5.8 (L) 6.4 - 8.3 g/dL Final     Alb   Date Value Ref Range Status   11/24/2020 4.5 3.5 - 5.2 g/dL Final   11/23/2020 2.3 (L) 3.5 - 5.2 g/dL Final   11/22/2020 2.6 (L) 3.5 - 5.2 g/dL Final     Total Bilirubin   Date Value Ref Range Status   11/24/2020 0.9 0.0 - 1.2 mg/dL Final   11/23/2020 0.4 0.0 - 1.2 mg/dL Final   11/22/2020 0.5 0.0 - 1.2 mg/dL Final     Alkaline Phosphatase   Date Value Ref Range Status   11/24/2020 183 (H) 40 - 129 U/L Final   11/23/2020 246 (H) 40 - 129 U/L Final   11/22/2020 347 (H) 40 - 129 U/L Final     AST   Date Value Ref Range Status   11/24/2020 56 (H) 0 - 39 U/L Final   11/23/2020 50 (H) 0 - 39 U/L Final     Comment:     Specimen is slightly Hemolyzed. Result may be artificially increased. 11/22/2020 112 (H) 0 - 39 U/L Final     ALT   Date Value Ref Range Status   11/24/2020 55 (H) 0 - 40 U/L Final   11/23/2020 68 (H) 0 - 40 U/L Final   11/22/2020 106 (H) 0 - 40 U/L Final     GFR Non-   Date Value Ref Range Status   11/24/2020 44 >=60 mL/min/1.73 Final     Comment:     Chronic Kidney Disease: less than 60 ml/min/1.73 sq.m. Kidney Failure: less than 15 ml/min/1.73 sq.m. Results valid for patients 18 years and older.      11/23/2020 57 >=60 mL/min/1.73 Final     Comment:     Chronic Kidney Disease: less than 60 ml/min/1.73 sq.m. Kidney Failure: less than 15 ml/min/1.73 sq.m. Results valid for patients 18 years and older. 11/22/2020 57 >=60 mL/min/1.73 Final     Comment:     Chronic Kidney Disease: less than 60 ml/min/1.73 sq.m. Kidney Failure: less than 15 ml/min/1.73 sq.m. Results valid for patients 18 years and older. GFR    Date Value Ref Range Status   11/24/2020 53  Final   11/23/2020 >60  Final   11/22/2020 >60  Final     Magnesium   Date Value Ref Range Status   11/24/2020 2.1 1.6 - 2.6 mg/dL Final   11/23/2020 2.0 1.6 - 2.6 mg/dL Final   11/22/2020 1.9 1.6 - 2.6 mg/dL Final     Phosphorus   Date Value Ref Range Status   11/24/2020 6.3 (H) 2.5 - 4.5 mg/dL Final   11/23/2020 3.9 2.5 - 4.5 mg/dL Final   11/22/2020 3.5 2.5 - 4.5 mg/dL Final     Recent Labs     11/24/20  0104   PH 7.127*   PO2 95.2   PCO2 68.3*   HCO3 22.1   BE -7.4*   O2SAT 94.6       RADIOLOGY:  XR CHEST PORTABLE   Final Result   Unchanged bilateral extensive infiltrates. XR CHEST PORTABLE   Final Result   There is interval progression of diffuse bilateral pulmonary consolidations,   which may be secondary to pneumonia and/or pulmonary edema. Fluoroscopy modified barium swallow with video   Final Result   Swallowing mechanism grossly within normal limits without evidence of   aspiration. Please see separate speech pathology report for full discussion of findings   and recommendations. XR CHEST PORTABLE   Final Result   1. No interval change in extensive bilateral airspace disease      XR CHEST PORTABLE   Final Result   Improving but persistent multifocal pneumonia. XR CHEST PORTABLE   Final Result   Unchanged diffuse pulmonary infiltrates. CT HEAD WO CONTRAST   Final Result   No acute intracranial abnormality. CT ABDOMEN PELVIS WO CONTRAST Additional Contrast? None   Final Result   No evidence of acute intra-abdominal process, status post cholecystectomy.    Sigmoid diverticulosis. Infiltrates at the visualized lung bases. XR CHEST PORTABLE   Final Result   Increased bilateral lung opacities are suspicious for multifocal pneumonia. XR CHEST (2 VW)    (Results Pending)     PROBLEM LIST:  Principal Problem:    Septic shock ([address]  Resolved Problems:    * No resolved hospital problems. *  /64   Pulse 108   Temp 97 °F (36.1 °C) (Axillary)   Resp 24   Ht 5' 7\" (1.702 m)   Wt 160 lb 8 oz (72.8 kg)   SpO2 96%   BMI 25.14 kg/m²     General: Awake, with tachypnea while on BPAP  HEENT: No head lesions, PERRL, EOMI, mouth partially edentulous, no nasal lesions, no cervical adenopathy palpated  Respiratory: Lungs with diminished breath sounds with bilateral crackles worse on left hemithorax,  no accessory muscle use while on BPAP  CV: Tachycardic, no murmurs, JVD 3 cm above clavicle, trace leg edema  Abdomen: Soft, non tender, + bowel sounds, no lesions  Skin: Hydrated, adequate turgor, no rash, capillary refill <2 seconds  Extremities: Muscular strength 1/5 in 4 limbs, moves 4 limbs spontaneously, distal pulses present  Neurology: Awake, neck is supple, no meningitic signs present. A/P:  Septic shock, acute hypoxemic respiratory failure, metabolic acidois secondary to E. Coli bacteremia, pneumonia and pyelonephritis  --Continue BPAP. His condition has worsened and his prognosis is poor.    --Antibiotics regimen: Meropenem/vancomycin and doxycicline, being followed by ID  --Cultures reviewed  --Steroid supplementation: Hydrocortisone    DVT prophylaxis with heparin SQ    Rest of supportive care as per primary team    Acute kidney injury secondary to sepsis/hypovolemia/prerenal  --Avoid nephrotoxins and dose medications according GFR  --Receiving diuresis given worsening respiratory status    Hypernatremia  --Improving    Metabolic encephalopathy of multifactorial etiology  --Reorientation        Luis Christopher MD  Pulmonary and Critical Care Medicine

## 2020-11-24 NOTE — PROGRESS NOTES
Internal Medicine Progress Note    [facility]    Lizette Tadeo. Jameson Chaves., F.A.CPratimaOPratimaI. Ree Peters D.O., NATALIE Guzmán D.O. Karel Arcos, MSN, APRN, NP-C  Rande Blizzard. Kendra Toledo, MSN, APRN-CNP     Primary Care Physician: Raul Jeffries MD   Admitting Physician:  Quincy Lefort, DO  Admission date and time: 11/19/2020  6:24 PM    Room:  [address] [place] [address]  Admitting diagnosis: Septic shock [facility]) [A41.9, R65.21]    Patient Name: Vanessa Estrada  MRN: [id number]    Date of Service: 11/24/2020     Subjective:  Bang Wan is a 80 y.o. male who was seen and examined today,11/24/2020, at the bedside. Bang Wan was transitioned out of the intensive care unit and has been improving. He went for barium swallow and had substantial decline approximately 30 minutes after returning to floor. X-ray showed no interval change in extensive bilateral infiltrates. Arterial blood gases were reviewed. Given the patient's family's request for no escalation of care he was transitioned to BiPAP. He has continued to exhibit overt respiratory distress. Due to his condition, he is unable to provide subjective data. Review of System:   Unable to be obtained in the patient's current condition with underlying dementia      Physical Exam:  No intake/output data recorded. Intake/Output Summary (Last 24 hours) at 11/24/2020 0757  Last data filed at 11/24/2020 0618  Gross per 24 hour   Intake 60 ml   Output 2200 ml   Net -2140 ml   I/O last 3 completed shifts: In: 60 [I.V.:10; IV Piggyback:50]  Out: 2200 [Urine:2200]  Patient Vitals for the past 96 hrs (Last 3 readings):   Weight   11/24/20 0456 160 lb 8 oz (72.8 kg)   11/22/20 0600 156 lb 4.8 oz (70.9 kg)   11/21/20 0440 149 lb 8 oz (67.8 kg)     Vital Signs:   Blood pressure 130/88, pulse 111, temperature 97.1 °F (36.2 °C), temperature source Temporal, resp. rate 26, height 5' 7\" (1.702 m), weight 160 lb 8 oz (72.8 kg), SpO2 98 %.     General appearance:  Lethargic, arouses to verbal stimulation, acutely dyspneic. Head:  Normocephalic. No masses, lesions or tenderness. Eyes:  PERRLA. EOMI. Sclera clear. Buccal mucosa moist.  Bilateral conjunctivitis  ENT:  Ears normal.  Buccal mucosa very dry  Neck:    Supple. Trachea midline. No thyromegaly. No JVD. No bruits. Heart:    Rhythm regular. Tachycardic with a rate of 118, more pronounced systolic murmur l  Lungs:    Labored tachypneic respirations with coarse bilateral breath sounds diffusely. Overtly respiratory distress. Abdomen:   Soft. Non-tender. Non-distended. Bowel sounds positive. No organomegaly or masses. No pain on palpation. Extremities:    Peripheral pulses present. No peripheral edema. No ulcers. No cyanosis. No clubbing. Tenting of skin. Dressing to left heel. Neurologic:    Awakens to verbal stimulus. Eyes are open but stares blankly. Does not follow commands nor does answer questions. Musculoskeletal:   Spine ROM normal.  Gait not assessed. Integumentary:  No rashes,  poor skin turgor. Multiple tattoos  Genitalia/Breast:  Voiding with use of a Rdz catheter.     Medication:  Scheduled Meds:   acetylcysteine  4 mL Inhalation Q4H    albuterol  2.5 mg Nebulization Q4H    pantoprazole  40 mg Intravenous Daily    hydrocortisone sodium succinate PF  50 mg Intravenous BID    [Held by provider] pantoprazole  40 mg Oral QAM AC    albumin human  50 g Intravenous Q8H    vitamin D3  1,000 Units Oral Daily    meropenem  1 g Intravenous Q8H    midodrine  10 mg Oral TID WC    vancomycin  1,000 mg Intravenous Q36H    aspirin  81 mg Oral Daily    sodium chloride flush  10 mL Intravenous 2 times per day    heparin (porcine)  5,000 Units Subcutaneous 3 times per day    doxycycline (VIBRAMYCIN) IV  100 mg Intravenous Q12H     Continuous Infusions:   [Held by provider] 0.9% NaCl with KCl 40 mEq Stopped (11/23/20 2303)    dextrose         Objective Data:  CBC with Differential:    Lab Results   Component Value Date    WBC 18.8 11/24/2020    RBC 2.66 11/24/2020    HGB 8.4 11/24/2020    HCT 26.9 11/24/2020     11/24/2020    .1 11/24/2020    MCH 31.6 11/24/2020    MCHC 31.2 11/24/2020    RDW 14.5 11/24/2020    SEGSPCT 53 10/12/2013    METASPCT 0.9 11/24/2020    LYMPHOPCT 6.1 11/24/2020    MONOPCT 6.1 11/24/2020    MYELOPCT 1.7 11/24/2020    EOSPCT 8.7 10/06/2020    BASOPCT 0.2 11/24/2020    MONOSABS 1.13 11/24/2020    LYMPHSABS 1.13 11/24/2020    EOSABS 0.00 11/24/2020    BASOSABS 0.00 11/24/2020     CMP:    Lab Results   Component Value Date     11/24/2020    K 4.2 11/24/2020    K 5.0 04/14/2019     11/24/2020    CO2 25 11/24/2020    BUN 38 11/24/2020    CREATININE 1.5 11/24/2020    GFRAA 53 11/24/2020    LABGLOM 44 11/24/2020    GLUCOSE 107 11/24/2020    PROT 6.9 11/24/2020    LABALBU 4.5 11/24/2020    CALCIUM 8.3 11/24/2020    BILITOT 0.9 11/24/2020    ALKPHOS 183 11/24/2020    AST 56 11/24/2020    ALT 55 11/24/2020           Assessment:  1. Septic shock with multiple infectious sources including gram-negative bacteremia, Streptococcus healthcare associated pneumonia, and urinary tract infection  2. Acute hypoxic hypercapnic respiratory failure   3. Respiratory distress in the setting of likely gross barium aspiration   4. acute renal failure with regression after diuretic  5. Shock liver with transaminitis  6. Macrocytic anemia  7. Elevated troponin probably secondary to demand ischemia versus non-ST elevated myocardial infarction  8. BPH  9. Severe dementia    Plan:   [employer] has regressed significantly clinically. This seems to coincide with his modified barium swallow and based on his examination have concern for gross barium aspiration. We will become more aggressive with his respiratory regimen as his CODE STATUS allows. We will continue with BiPAP and strict n.p.o. All medications will be transitioned to IV.   We will make mucolytic's more aggressive and add chest vest.  We will add nasopharyngeal suctioning. Fluids have been placed on hold, renal insufficiency did worsen with the administration of diuretics. We will continue with antibiotic coverage. CODE STATUS will be revisited as condition dictates and we will repeat chest x-ray tomorrow. Adriana Wadsworth condition is guarded and his prognosis is poor. More than 50% of my  time was spent at the bedside counseling/coordinating care with the patient and/or family with face to face contact. This time was spent reviewing notes and laboratory data as well as instructing and counseling the patient. Time I spent with the family or surrogate(s) is included only if the patient was incapable of providing the necessary information or participating in medical decisions. I also discussed the differential diagnosis and all of the proposed management plans with the patient and individuals accompanying the patient. Robert Rc requires this high level of physician care and nursing on the ICU unit due the complexity of decision management and chance of rapid decline or death. Continued cardiac monitoring and higher level of nursing are required. I am readily available for any further decision-making and intervention.      Sobia Gould, YUSUF - CNP  11/24/2020  7:57 AM

## 2020-11-25 NOTE — SIGNIFICANT EVENT
Zi Hough was updated regarding the patient's clinical decline and deterioration. He wishes to abide by his brother's wishes and to institute comfort measures entirely. This includes the institution of a morphine infusion, as needed Ativan for agitation, and as needed Robinul for secretions. We will employ BiPAP therapy and once comfortable, we will transition to nasal cannula oxygen. His passing is imminent. We will continue with comfort measures.     Shani Bustillo  3:00 PM  11/25/2020

## 2020-11-25 NOTE — CARE COORDINATION
11/25/2020 Negative Covid (11/23/2020). Cm transition of care:  pt has been accepted at [facility]- No precert needed Medicare, JOLENE will need signed and HENS completed by ss Oriana. Pt for potential palliative care versus Hospice.  Staff talking to family. CM/SS to follow.  Electronically signed by Merly Leonardo RN-BC on 11/25/2020 at 1:12 PM

## 2020-11-25 NOTE — PROGRESS NOTES
Internal Medicine Progress Note    [facility]    Jewels Waggoner. NATALIE Mart D.O., NATALIE Gomez D.O. Eliu Ramírez, MSN, APRN, NP-C  Glo Samuels. Nikki Moon, MSN, APRN-CNP     Primary Care Physician: Kamari Andre MD   Admitting Physician:  Jessy Cervantes DO  Admission date and time: 11/19/2020  6:24 PM    Room:  [address] [place] [address]  Admitting diagnosis: Septic shock [facility]) [A41.9, R65.21]    Patient Name: Gavin Neri  MRN: [id number]    Date of Service: 11/25/2020     Subjective:  Gvaino Carreno is a 80 y.o. male who was seen and examined today,11/25/2020, at the bedside. Unfortunately, Gavino Carreno remains critically ill with ongoing tenuous respiratory status. He is maintained on BiPAP therapy and desaturates the minute this is removed. His overall condition is dire at this point we discussed palliative care consultation. Review of System:   Unable to be obtained in the patient's current condition with underlying dementia      Physical Exam:  No intake/output data recorded. Intake/Output Summary (Last 24 hours) at 11/25/2020 0901  Last data filed at 11/25/2020 0659  Gross per 24 hour   Intake 100 ml   Output 1150 ml   Net -1050 ml   I/O last 3 completed shifts: In: 100 [IV Piggyback:100]  Out: 8625 [[id number]]  Patient Vitals for the past 96 hrs (Last 3 readings):   Weight   11/25/20 0419 159 lb 6.4 oz (72.3 kg)   11/24/20 0456 160 lb 8 oz (72.8 kg)   11/22/20 0600 156 lb 4.8 oz (70.9 kg)     Vital Signs:   Blood pressure (!) 94/54, pulse 108, temperature 98.7 °F (37.1 °C), temperature source Temporal, resp. rate (!) 40, height 5' 7\" (1.702 m), weight 159 lb 6.4 oz (72.3 kg), SpO2 97 %. General appearance:  Lethargic, arouses to verbal stimulation, acutely dyspneic. Head:  Normocephalic. No masses, lesions or tenderness. Eyes:  PERRLA. EOMI. Sclera clear.   Buccal mucosa moist.  Bilateral conjunctivitis  ENT:  Ears normal.  Buccal mucosa included only if the patient was incapable of providing the necessary information or participating in medical decisions. I also discussed the differential diagnosis and all of the proposed management plans with the patient and individuals accompanying the patient. Refugia Amanda requires this high level of physician care and nursing on the ICU unit due the complexity of decision management and chance of rapid decline or death. Continued cardiac monitoring and higher level of nursing are required. I am readily available for any further decision-making and intervention.      Tres Dillard DO  11/25/2020  9:01 AM

## 2020-11-25 NOTE — PROGRESS NOTES
Pharmacy Consultation Note  (Antibiotic Dosing and Monitoring)    Vancomycin has been discontinued; pharmacy will sign-off. Please reconsult if needed.     Thank you,  Vivian Tripathi, PharmD, BCPS 11/25/2020 12:42 PM

## 2020-11-25 NOTE — FLOWSHEET NOTE
Inpatient Wound Care    Admit Date: 11/19/2020  6:24 PM    Reason for consult:  Bilateral heels  Sacral area  Right elbow      Significant history:    Past Medical History:   Diagnosis Date    Chronic kidney disease     [facility]     Dementia [facility]     Depression     Hypertension     Neuromuscular disorder [facility]     Other disorders of kidney and ureter in diseases classified elsewhere     Pneumonia     [facility]        Wound history:      Findings:       11/25/20 1047   Skin Integrity   Skin Integrity (WDL) X   Skin Integrity Redness   Location   (buttocks)   Skin Integrity Site 2   Skin Integrity Location 2   (bridge of nose)   Location 2 bridge of nose   Skin Integrity Site 3   Skin Integrity Location 3 Redness  (purplish)    Location 3 bilateral heels   Wound 11/20/20 Heel Left   Date First Assessed/Time First Assessed: 11/20/20 1528   Present on Hospital Admission: Yes  Primary Wound Type: Pressure Injury  Location: Heel  Wound Location Orientation: Left   Wound Image    Wound Etiology Deep tissue/Injury   Wound Cleansed Cleansed with saline   Wound Length (cm) 5 cm   Wound Width (cm) 4.5 cm   Wound Surface Area (cm^2) 22.5 cm^2   Wound Assessment   (purplish red fluid filled)   Drainage Amount Scant   Odor None   Wound 11/20/20 Elbow Right;Posterior   Date First Assessed/Time First Assessed: 11/20/20 1540   Present on Hospital Admission: Yes  Primary Wound Type: Skin Tear  Location: (!) Elbow  Wound Location Orientation: Right;Posterior   Dressing/Treatment Foam   Odor None   Marianne-wound Assessment Intact   Wound 11/20/20 Sacrum Left   Date First Assessed/Time First Assessed: 11/20/20 1540   Primary Wound Type: Pressure Injury  Location: Sacrum  Wound Location Orientation: Left   Wound Cleansed Cleansed with saline   Wound Assessment Erythema   Drainage Amount None   Wound 11/25/20 Heel Right   Date First Assessed/Time First Assessed: 11/25/20 1100 Present on Hospital Admission: No  Primary Wound Type: Pressure Injury  Location: Heel  Wound Location Orientation: Right   Wound Image    Wound Cleansed Cleansed with saline   Wound Length (cm) 3 cm   Wound Width (cm) 2.2 cm   Wound Surface Area (cm^2) 6.6 cm^2   Drainage Amount None   Odor None       Impression:  Suspect deep tissue injury to bilateral heels  Redness to bridge of nose  Pt is on cont bipap      Interventions in place:  Pressure relief bed  Applied medline heel protectors    Plan:  Called resp pt will be eval for alterating mask  Pt does not tolerate being off  They will put mepilex lite on tammi Piña 11/25/2020 11:05 AM

## 2020-11-25 NOTE — CONSULTS
Palliative Care Department  [phone]  Palliative Care Initial Consult  Provider Ulices Colin MD      PATIENT: Nury Walsh  : 1931  MRN: [id number]  ADMISSION DATE: 2020  6:24 PM    Palliative Medicine was consulted on hospital day 6 for assistance with Goals of care, Code Status Discussion, Family support, Symptom management     HPI:     Lucyann Severe is a 80 y.o. y/o male with a history of dementia, copd, depression, htn, CKD G3A  who presented to [facility]) on 2020 with shortness of breath. ASSESSMENT/PLAN:     Pertinent Hospital Diagnoses      Septic shock    TUNG   Transaminates     Palliative Care Encounter / Counseling Regarding Goals of Care  Please see detailed goals of care discussion as below   At this time, Nury Walsh, Does Not have capacity for medical decision-making. Capacity is time limited and situation/question specific   Outcome of goals of care meeting: Spoke to Rayo Silverio and his wife, they both said goodbyes to Chris's wife about 2 years ago through hospice. I told him that Kei Villeda is not well at the moment, they wanted to change the CODE STATUS, to DNR CC   Code status DNR-CC   Advanced Directives: no POA or living will in epic   Surrogate/Legal NOK: Branden Diaz, [phone]   Asked them about hospice care, they are interested since bermudez wife  in hospice care. Spiritual assessment: no spiritual distress identified  Bereavement and grief: to be determined  Referrals to: none today    Sign off  · Will Sign off, no further Code status or [address] discussions at this time. Please re-consult if needed. Thank you for the opportunity to participate in the care of Nury Walsh. Ulices Colin MD  Palliative Medicine     SUBJECTIVE:       Details of Conversation: I visited Kei Villeda in his room, he is obtunded with a BiPAP mask on.   I called his brother who is the legal guardian, I spoke with him and his

## 2020-11-25 NOTE — PROGRESS NOTES
Patient discharged home in stable condition. Medication and side effects reviewed with patient. Patient instructed on follow up appointments. Patient verbalized an understanding, denies any issues or concerns at this time. IV removed tip intact.

## 2020-11-25 NOTE — PROGRESS NOTES
[facility]  [facility]  Progress Note    NAME:Chris Foley  1931  DATE OF SERVICE:20    FACE TO FACE ENCOUNTER 20  ID FOLLOWING FOR atbx sepsis    SUBJECTIVE:  Chief Complaint   Patient presents with    Loss of Consciousness     Syncope without colapse on toilet. Pt A&O x0 @ baseline. hematuria - was treated with levequin for UTI. New ABX for UTI was to start today. Poss change in mentation?     presented on admission    Today:   In bed - minimally responsive today  On bipap- ++ accessory muscles   No fevers. On bipap- 97 %  Patient is tolerating medications. No reported adverse drug reactions. Review of systems:  As stated above in the chief complaint, otherwise negative.     Medications:  Scheduled Meds:   albuterol  2.5 mg Nebulization Q4H    pantoprazole  40 mg Intravenous Daily    And    sodium chloride (PF)  10 mL Intravenous Daily    hydrocortisone sodium succinate PF  50 mg Intravenous BID    [Held by provider] pantoprazole  40 mg Oral QAM AC    vitamin D3  1,000 Units Oral Daily    meropenem  1 g Intravenous Q8H    midodrine  10 mg Oral TID WC    vancomycin  1,000 mg Intravenous Q36H    aspirin  81 mg Oral Daily    sodium chloride flush  10 mL Intravenous 2 times per day    heparin (porcine)  5,000 Units Subcutaneous 3 times per day    doxycycline (VIBRAMYCIN) IV  100 mg Intravenous Q12H     Continuous Infusions:   dextrose       PRN Meds:albuterol, sodium chloride flush, acetaminophen **OR** acetaminophen, polyethylene glycol, glucose, dextrose, glucagon (rDNA), dextrose, perflutren lipid microspheres    OBJECTIVE:  BP (!) 94/54   Pulse 108   Temp 98.7 °F (37.1 °C) (Temporal)   Resp (!) 40   Ht 5' 7\" (1.702 m)   Wt 159 lb 6.4 oz (72.3 kg)   SpO2 97%   BMI 24.97 kg/m²   Temp  Av.4 °F (36.3 °C)  Min: 97 °F (36.1 °C)  Max: 98.7 °F (37.1 °C)  Constitutional:  The patient is with eyes open- slow to respond- does not follow commands. very dyspneic/ tachypnic   Skin:    Warm and dry. No rashes were noted. HEENT:   Round and reactive pupils. AT/NC right eye red  Neck:    Supple to movements. Chest:   ++ respiratory distress- on bipap ++ accessory muscle use. Coarse . Cardiovascular:  Tachy   Abdomen:   Positive bowel sounds to auscultation. Benign to palpation. Extremities:   No clubbing, no cyanosis, min edema. CNS    AAxO   Lines: pIV  Rdz- yellow urine     Radiology:  Laboratory and Tests Review:  Lab Results   Component Value Date    WBC 23.2 (H) 11/25/2020    WBC 18.8 (H) 11/24/2020    WBC 7.7 11/23/2020    HGB 7.1 (L) 11/25/2020    HCT 24.5 (L) 11/25/2020    .7 (H) 11/25/2020     (L) 11/25/2020     No results found for: CRP  Lab Results   Component Value Date    ALT 57 (H) 11/25/2020    AST 71 (H) 11/25/2020    ALKPHOS 157 (H) 11/25/2020    BILITOT 1.2 11/25/2020     Lab Results   Component Value Date     11/25/2020    K 4.5 11/25/2020    K 5.0 04/14/2019     11/25/2020    CO2 20 11/25/2020    BUN 64 11/25/2020    CREATININE 2.5 11/25/2020    CREATININE 1.5 11/24/2020    CREATININE 1.2 11/23/2020    GFRAA 30 11/25/2020    LABGLOM 24 11/25/2020    GLUCOSE 88 11/25/2020    PROT 7.2 11/25/2020    LABALBU 5.1 11/25/2020    CALCIUM 8.4 11/25/2020    BILITOT 1.2 11/25/2020    ALKPHOS 157 11/25/2020    AST 71 11/25/2020    ALT 57 11/25/2020     No results found for: CRP  Lab Results   Component Value Date    SEDRATE 25 (H) 03/25/2019       Microbiology:   Recent Labs     11/24/20  1940   COVID19 Non-Reactive     Lab Results   Component Value Date    BLOODCULT2 24 Hours no growth 11/21/2020    BLOODCULT2 5 Days- no growth 06/07/2016    BLOODCULT2 5 Days- no growth 06/07/2016    ORG Escherichia coli 11/19/2020    ORG Escherichia coli 04/15/2019    ORG FILM ARR Respiratory syncitial virus Detected 06/07/2016     No results for input(s): CXSURG, ORG in the last 72 hours.   No results for input(s): WNDABS, ORG in the last 72 hours. No results for input(s): Shila Ding in the last 72 hours. ASSESSMENT:  · Sepsis  · Gn sepsis  · S. pneumoniae pneumonia   · Leukocytosis- also on solu-cortef   · tung - creat 2.5  · uti  · hypernatremia      Plan:   · Continue meropenem - adjust dose  · Stop vancomycin (VANCOCIN) intermittent dosing (placeholder), RX Placeholder   · On doxycycline (VIBRAMYCIN) 100 mg in dextrose 5 % 100 mL IVPB, Q12H  · On solu-cortef   · Monitor labs-- lfts are improving creat- 2.5- monitor-   · Check chest x-ray- reviewed   · repeat COVID test - was negative- antibodies- negative   · ++ accessory muscle use- in distress. · He is an DNR- CCA  · Palliative care consult - ? Maybe hospice   · Patient continues to decline     Imaging and labs were reviewed per medical records. Thank you for involving me in the care of Joe Life will continue to follow. Please do not hesitate to call for any questions or concerns. Electronically signed by YUSUF Willard on 11/25/2020 at 12:13 PM  As above    This is a face to face encounter with Pavel Kaplan on 11/25/20. I have performed and participated in the history, exam, medical decision making, and  POC  with the NURSE PRACTITIONER and provided the instruction and education regarding this patient's care. Imaging and labs were reviewed per medical records and any ID pertinent labs were addressed with the patient. The patient was educated about the diagnosis, prognosis, indications, risks and benefits of treatment. ON BIPAP  HAS RESP DISTRESS  AFEBRILE   LEUKOCYTOSIS  TUNG  morphine (PF) 100 mg in sodium chloride 0.9 % 100 mL infusion, Continuous  LORazepam (ATIVAN) injection 1 mg, Q1H PRN  glycopyrrolate (ROBINUL) injection 0.2 mg, Q1H PRN  scopolamine (TRANSDERM-SCOP) transdermal patch 1 patch, Q72H    PT MADE DNR CC  SIGN OFF    Pt had the opportunity to ask questions.   All questions were answered. Thank you for involving me in the care of [employer]. Please do not hesitate to call for any questions or concerns.     Electronically signed by Viv Mack MD on 11/25/2020 at 5:52 PM    Phone [phone]  Fax [phone]

## 2020-11-25 NOTE — PROGRESS NOTES
Spoke with Serafin Lange pts brother and he would like us to keep his brother comfortable and is ok with Hospice Care.  Notified Dr Luz Elena Umaña of this conversation

## 2020-11-25 NOTE — PROGRESS NOTES
Nephrology Progress Note  Patient's Name: Magda Heller    Reason for Consult:  Hypernatremia and TUNG  Requesting Physician:  Taylor Rooney MD    Chief Complaint:  syncope    History Obtained From:  past medical records    History of Present Ilness:   FROM 11-21  Magda Heller is a 80 y.o. male with prior history of dementia, copd, depression, htn, CKD G3A . Baseline serum cr 1.3mg/dl with an e-GFR=52ml/min who presented last night from a nursing home for syncope. He was hypotensive. He was found to be in septic shock with possible pneumonia and uti. He was started on doxy and vanco. He has been started on ivf and solucortef. Labs show na 159>154, co2 24, bun 47, cr 2.1>1.9, ca 7.5, alb 2.5, hgb 9.5, plt 205, fena <1. Ct shows no hydro. 11/25: seen and examined. Limited history and ROS due to dementia. Events since last evaluation noted. Clinically appears significantly worse over the past two days. There is concern for aspiration. He is sitting up in bed, on bipap  He does not answer questions. He is tachycardic and tachypneic and hypotensive  Renal function worsening with hypernatremia  Leukocytosis worsening  Hemoglobin and platelets decreasing        Past Medical History:   Diagnosis Date    Chronic kidney disease     COPD (chronic obstructive pulmonary disease) ([address]     Dementia (HCC)     Depression     Hypertension     [facility]     Other disorders of kidney and ureter in diseases classified elsewhere     Pneumonia     Psychosis ([facility]        Past Surgical History:   Procedure Laterality Date    ABDOMEN SURGERY      APPENDECTOMY      CHOLECYSTECTOMY      COLONOSCOPY      ENDOSCOPY, COLON, DIAGNOSTIC      KIDNEY REMOVAL      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 He quit after 25.00 years of use.  He has never used smokeless tobacco. He reports that he does not drink alcohol or use drugs. Allergies:  Patient has no known allergies. Current Medications:    albuterol (PROVENTIL) nebulizer solution 2.5 mg, Q4H PRN  albuterol (PROVENTIL) nebulizer solution 2.5 mg, Q4H  pantoprazole (PROTONIX) injection 40 mg, Daily    And  sodium chloride (PF) 0.9 % injection 10 mL, Daily  hydrocortisone sodium succinate PF (SOLU-CORTEF) injection 50 mg, BID  [Held by provider] pantoprazole (PROTONIX) tablet 40 mg, QAM AC  Vitamin D (CHOLECALCIFEROL) tablet 1,000 Units, Daily  meropenem (MERREM) 1 g in sodium chloride 0.9 % 100 mL IVPB (mini-bag), Q8H  midodrine (PROAMATINE) tablet 10 mg, TID WC  vancomycin 1000 mg IVPB in 250 mL D5W addavial, Q36H  aspirin EC tablet 81 mg, Daily  sodium chloride flush 0.9 % injection 10 mL, 2 times per day  sodium chloride flush 0.9 % injection 10 mL, PRN  acetaminophen (TYLENOL) tablet 650 mg, Q6H PRN    Or  acetaminophen (TYLENOL) suppository 650 mg, Q6H PRN  polyethylene glycol (GLYCOLAX) packet 17 g, Daily PRN  glucose (GLUTOSE) 40 % oral gel 15 g, PRN  dextrose 50 % IV solution, PRN  glucagon (rDNA) injection 1 mg, PRN  dextrose 5 % solution, PRN  heparin (porcine) injection 5,000 Units, 3 times per day  perflutren lipid microspheres (DEFINITY) injection 1.65 mg, ONCE PRN  doxycycline (VIBRAMYCIN) 100 mg in dextrose 5 % 100 mL IVPB, Q12H        Review of Systems:   Review of systems not obtained due to patient factors.     Physical exam:   Constitutional:  Elderly male in NAD  Vitals:   VITALS:  BP (!) 94/54   Pulse 108   Temp 98.7 °F (37.1 °C) (Temporal)   Resp (!) 40   Ht 5' 7\" (1.702 m)   Wt 159 lb 6.4 oz (72.3 kg)   SpO2 97%   BMI 24.97 kg/m²   24HR INTAKE/OUTPUT:      Intake/Output Summary (Last 24 hours) at 11/25/2020 1031  Last data filed at 11/25/2020 0060  Gross per 24 hour   Intake 100 ml   Output 1150 ml   Net -1050 ml       Constitutional: Patient in mild distress; on bipap   Head: normocephalic, atraumatic  Cardiovascular: regular rate and rhythm  Respiratory: rhonchi throughout   Gastrointestinal: soft, nontender, nondistended  Ext: no edema  Neuro: awake  Skin: dry, no rash   : +mccray          Data:   Labs:  CBC:   Lab Results   Component Value Date    WBC 23.2 11/25/2020    RBC 2.34 11/25/2020    HGB 7.1 11/25/2020    HCT 24.5 11/25/2020    .7 11/25/2020    MCH 30.3 11/25/2020    MCHC 29.0 11/25/2020    RDW 15.0 11/25/2020     11/25/2020    MPV 13.4 11/25/2020     CBC with Differential:    Lab Results   Component Value Date    WBC 23.2 11/25/2020    RBC 2.34 11/25/2020    HGB 7.1 11/25/2020    HCT 24.5 11/25/2020     11/25/2020    .7 11/25/2020    MCH 30.3 11/25/2020    MCHC 29.0 11/25/2020    RDW 15.0 11/25/2020    SEGSPCT 53 10/12/2013    METASPCT 0.9 11/24/2020    LYMPHOPCT 4.4 11/25/2020    MONOPCT 7.9 11/25/2020    MYELOPCT 1.8 11/25/2020    EOSPCT 8.7 10/06/2020    BASOPCT 0.1 11/25/2020    MONOSABS 1.86 11/25/2020    LYMPHSABS 0.93 11/25/2020    EOSABS 0.00 11/25/2020    BASOSABS 0.00 11/25/2020     Hemoglobin/Hematocrit:    Lab Results   Component Value Date    HGB 7.1 11/25/2020    HCT 24.5 11/25/2020     CMP:    Lab Results   Component Value Date     11/25/2020    K 4.5 11/25/2020    K 5.0 04/14/2019     11/25/2020    CO2 20 11/25/2020    BUN 64 11/25/2020    CREATININE 2.5 11/25/2020    GFRAA 30 11/25/2020    LABGLOM 24 11/25/2020    GLUCOSE 88 11/25/2020    PROT 7.2 11/25/2020    LABALBU 5.1 11/25/2020    CALCIUM 8.4 11/25/2020    BILITOT 1.2 11/25/2020    ALKPHOS 157 11/25/2020    AST 71 11/25/2020    ALT 57 11/25/2020     BMP:    Lab Results   Component Value Date     11/25/2020    K 4.5 11/25/2020    K 5.0 04/14/2019     11/25/2020    CO2 20 11/25/2020    BUN 64 11/25/2020    LABALBU 5.1 11/25/2020    CREATININE 2.5 11/25/2020    CALCIUM 8.4 11/25/2020    GFRAA 30 11/25/2020    LABGLOM 24 11/25/2020    GLUCOSE 88 11/25/2020     Sodium: Lab Results   Component Value Date     11/25/2020     BUN/Creatinine:    Lab Results   Component Value Date    BUN 64 11/25/2020    CREATININE 2.5 11/25/2020     Hepatic Function Panel:    Lab Results   Component Value Date    ALKPHOS 157 11/25/2020    ALT 57 11/25/2020    AST 71 11/25/2020    PROT 7.2 11/25/2020    BILITOT 1.2 11/25/2020    BILIDIR 0.4 11/20/2020    IBILI 0.2 11/20/2020    LABALBU 5.1 11/25/2020     Calcium:    Lab Results   Component Value Date    CALCIUM 8.4 11/25/2020     Ionized Calcium:  No results found for: IONCA  Magnesium:    Lab Results   Component Value Date    MG 2.3 11/25/2020     Phosphorus:    Lab Results   Component Value Date    PHOS 8.2 11/25/2020     LDH:  No results found for: LDH  Uric Acid:  No results found for: LABURIC, URICACID  PT/INR:    Lab Results   Component Value Date    PROTIME 15.9 11/19/2020    INR 1.4 11/19/2020     PTT:    Lab Results   Component Value Date    APTT 21.9 11/19/2020   [APTT}  Troponin:    Lab Results   Component Value Date    TROPONINI 0.24 11/24/2020     U/A:    Lab Results   Component Value Date    COLORU DIANNA 11/19/2020    PROTEINU >=300 11/19/2020    PHUR 8.5 11/19/2020    WBCUA 0-1 11/19/2020    RBCUA >20 11/19/2020    RBCUA PACKED 10/08/2013    BACTERIA RARE 11/19/2020    CLARITYU CLOUDY 11/19/2020    SPECGRAV 1.010 11/19/2020    LEUKOCYTESUR SMALL 11/19/2020    UROBILINOGEN 2.0 11/19/2020    BILIRUBINUR SMALL 11/19/2020    BILIRUBINUR Negative 11/09/2020    BLOODU LARGE 11/19/2020    GLUCOSEU Negative 11/19/2020    AMORPHOUS MODERATE 11/19/2020     ABG:    Lab Results   Component Value Date    PH 7.127 11/24/2020    PCO2 68.3 11/24/2020    PO2 95.2 11/24/2020    HCO3 22.1 11/24/2020    BE -7.4 11/24/2020    O2SAT 94.6 11/24/2020     HgBA1c:    Lab Results   Component Value Date    LABA1C 6.0 11/20/2020     Microalbumen/Creatinine ratio:  No components found for: RUCREAT  FLP:    Lab Results   Component Value Date    TRIG 111 03/25/2019    HDL 30 03/25/2019    LDLCALC 64 03/25/2019    LABVLDL 22 03/25/2019     TSH:    Lab Results   Component Value Date    TSH 2.520 11/20/2020     VITAMIN B12: No components found for: B12  FOLATE:    Lab Results   Component Value Date    FOLATE 7.8 11/22/2020     Iron Saturation:  No components found for: PERCENTFE  FERRITIN:  No results found for: FERRITIN  AMYLASE:  No results found for: AMYLASE  LIPASE:  No results found for: LIPASE  24 Hour Urine for Protein:  No components found for: Jaci Floro, HRRY67DP, UTV3  24 Hour Urine for Creatinine Clearance:  No components found for: CREAT4, UHRS10, UTV10  PSA:   Lab Results   Component Value Date    PSA 1.38 03/24/2019 11/20/20 2 D ECHO:   Summary    Left ventricular size is grossly normal.    Mild left ventricular concentric hypertrophy noted.    Ejection fraction is measured at 82%.    No evidence of left ventricular mass or thrombus noted.    No regional wall motion abnormalities seen.    Physiologic and/or trace mitral regurgitation is present.    No evidence of mitral valve stenosis.    Aortic valve opens well.    The aortic valve is trileaflet.    No hemodynamically significant aortic stenosis is present.    Physiologic and/or trace aortic regurgitation is noted.    Pulmonary hypertension is mild .    Physiologic and/or trace tricuspid regurgitation.    Regular rhythm. Imaging:  CXR results:  EXAMINATION:    ONE XRAY VIEW OF THE CHEST    11/19/2020 7:19 pm    COMPARISON:    June 7, 2016    HISTORY:    ORDERING SYSTEM PROVIDED HISTORY: Shortness of breath    TECHNOLOGIST PROVIDED HISTORY:    Reason for exam:->Shortness of breath    FINDINGS:    Increased bilateral lung opacities.  Cardiomegaly.  Atherosclerotic    calcification of the thoracic aorta.         Impression    Increased bilateral lung opacities are suspicious for multifocal pneumonia.       EXAMINATION:    CT OF THE ABDOMEN AND PELVIS WITHOUT CONTRAST 11/19/2020 8:30 pm TECHNIQUE:    CT of the abdomen and pelvis was performed without the administration of    intravenous contrast. Multiplanar reformatted images are provided for review. Dose modulation, iterative reconstruction, and/or weight based adjustment of    the mA/kV was utilized to reduce the radiation dose to as low as reasonably    achievable. COMPARISON:    None. HISTORY:    ORDERING SYSTEM PROVIDED HISTORY: septic concern for intraabdominal pathology    TECHNOLOGIST PROVIDED HISTORY:    Reason for exam:->septic concern for intraabdominal pathology    Additional Contrast?->None    FINDINGS:    Lower Chest: Extensive interstitial markings and patchy opacities at the    visualized lung bases.  No large pleural effusions.  The heart is mildly    enlarged with no pericardial effusion. Organs: The liver spleen, pancreas and adrenal glands appear unremarkable on    this non IV contrast scan.  The kidneys appear slightly atrophic with no    evidence of renal stones or hydronephrosis.  The gallbladder is surgically    absent. GI/Bowel: No evidence of obstructing or constricting mass lesions. Abdoul Get is    sigmoid diverticulosis with no evidence of diverticulitis. Pelvis: The bladder is collapsed with Rdz catheter in place there are    pockets of gas in the bladder which may have been introduced during    catheterization.  No significant adenopathy or ascites noted. Peritoneum/Retroperitoneum: No significant ascites or adenopathy.  No free    air.  Abdominal aorta shows atherosclerotic calcification with no evidence of    abdominal aortic aneurysm.  No periaortic fluid collection. Bones/Soft Tissues: No lytic or blastic bony lesions.  Degenerative changes    in the spine.         Impression    No evidence of acute intra-abdominal process, status post cholecystectomy. Sigmoid diverticulosis. Infiltrates at the visualized lung bases. Assessment    1.  Stage I TUNG on ckd 3a  Cr 1/2020 was 1.3 with gfr of 52ml/min  Presumed sec to decreased effective renal perfusion in the setting of the sepsis with hypotension and the free water deficit  No hydro on mayra  UA Bili small Blood large, protein>300mg/dl, small LE,  fena <1    Renal function worsening with decreased intake, diuretics  Off IV fluids with worsening respiratory status       2. Hypernatremia-  With decreased free water and likely decreased concentrating capacity    3. Hypotension  With sepsis    4. Hypocalcemia  In setting of hypoalbuminemia  Ionized Ca++ 1.17 WNL  PTH elevated at 129, Vit D low at 14  On vitamin D    5. 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  Respiratory failure  Possible due to aspiration  Received dose of diuretics on 11-24        Patient is critically ill with poor prognosis  Noted discussion of palliative care  Renal function worsening with decreased intake, hypotension, diuretics       Naren Gonzalez MD

## 2020-11-26 NOTE — PROGRESS NOTES
Spoke with legal guardian about after death arrangements. Arrangements were made in [place]. Guardian will contact with more info tomorrow.

## 2020-11-26 NOTE — PROGRESS NOTES
DR Ronald Lemos was called and notified of the following findings: Patient is unresponsive with non-reactive pupils, pt has no pulses or spontaneous breaths. No heart sounds or breath sounds are heard. Dr Ronald Lemos pronounced patient daed at  on 11/26/2020. Dr Ronald Lemos stated that Dr Luz Elena Umaña will sign the death certificate. Spoke with [facility] at 0330, they are not filing for donation and stated the body may be released. Referral # [id number]. Spoke with Legal guardian Abiodun Aragon to notify of patients death.

## 2020-11-27 LAB
ALBUMIN SERPL-MCNC: 2 G/DL (ref 3.5–4.7)
ALPHA-1-GLOBULIN: 0.2 G/DL (ref 0.2–0.4)
ALPHA-2-GLOBULIN: 0.9 G/FL (ref 0.5–1)
BETA GLOBULIN: 0.9 G/DL (ref 0.8–1.3)
ELECTROPHORESIS: ABNORMAL
GAMMA GLOBULIN: 1 G/DL (ref 0.7–1.6)

## 2020-11-27 NOTE — DISCHARGE SUMMARY
[address]                               DISCHARGE SUMMARY    PATIENT NAME: Kim Marte                 :        1931  MED REC NO:   [id number]                            ROOM:       4066  ACCOUNT NO:   [de-identified]                           ADMIT DATE: 2020  PROVIDER:     Jamie Fritz DO                  DISCHARGE DATE:  2020    DATE OF EXPIRATION:  2020    ADMITTING DIAGNOSIS:  Sepsis. FINAL DISCHARGE DIAGNOSES:  Septic shock with multiple infectious  sources, including gram-negative bacteremia, streptococcus;  healthcare-associated pneumonia; urinary tract infection; acute  hypoxemic-hypercapnic respiratory failure. SECONDARY DISCHARGE DIAGNOSES:  Acute renal failure with regression  after diuretics; shock liver with transaminitis; macrocytic anemia;  elevated troponin, probably secondary to demand ischemia versus  non-ST-elevated myocardial infarction; benign prostatic hypertrophy; and  severe dementia. COMPLICATIONS:  Respiratory distress in the setting of likely gross  barium aspiration. The patient  at 03:10 a.m. on 2020. CAUSE OF DEATH:  Multiple-system organ failure, complicated by acute  hypoxic-hypercapnic respiratory failure, complicated by possible  aspiration in the setting of recent sepsis. CONSULTATIONS OBTAINED:  Dr. Wm Ramos, Infectious Disease; Dr. Lorraine Lyons  for Nephrology; and the intensivist.    ADMITTING PHYSICIANS:  Dr. Christin Reich and Dr. Lj Esquivel. CHIEF COMPLAINT AND HISTORY OF CHIEF COMPLAINT:  This was an 80-year  white male who was admitted to [address] [place] [address]. The patient  presented from the nursing home with what appeared to be syncope,  baseline status. He was alert x0. The patient, apparently, was taken  to the bathroom, where he had a syncopal episode. Not much further  history was obtained.   The patient presented to the hospital here, where  he was seen and evaluated. PAST MEDICAL HISTORY:  Positive for the usual childhood diseases. Does  have a history of chronic kidney disease, COPD, dementia, depression,  hypertension, neuromuscular disorder, pneumonia, and psychosis. PHYSICAL EXAMINATION:  VITAL SIGNS:  Showed blood pressure to be low on admission at 80/70,  temperature 100.3, respirations 20. Afebrile. GENERAL APPEARANCE:  At this time, an elderly, 61-year-old, white male  who at this time did appear to be chronically ill. He was obtunded,  responded to painful stimuli. HEENT:  Buccal mucosa was dry. NECK:  With adequate carotid upstrokes without bruits. LUNGS:  Coarse. CARDIAC:  Heart sounds were tachycardic. ABDOMEN:  Soft. EXTREMITIES:  Revealed no edema. While the patient was in the hospital, he had multiple lab work  performed. Hemoglobin and hematocrit at the time of discharge were 7.1  and 24.5, white count was 23,000. HOSPITAL COURSE OF STAY:  The patient was admitted directly to the  hospital from the emergency room to the intensive coronary care unit. The patient, at this time, had initially presented to the hospital here  with what appeared to be hypotension and multiple electrolyte imbalance. The patient, initially, presented here with what appeared to be septic  shock along with type A metabolic acidosis with severe dehydration and  hypernatremia. Electrolytes were corrected with fluid administration,  and the patient was started on a vasopressor agent. The patient was  kept in the intensive care unit with multidisciplinary approach at this  time along with Renal and Infectious Disease. The patient, at this  time, continued to be monitored with some slight improvement noted. It  was noted on 11/23 that the patient did seem to be improving. He was  off the vasopressor agent at this time.   There was some evidence of some  transaminitis due to shock liver but the patient at times felt stable  enough to be transferred from the intensive care unit to the immediate  care unit. The vasopressor had been weaned off. Steroids had been  adjusted along with antibiotic therapy. Dr. Haley Cuellar did discuss the  condition with the family at this time. They did proceed with a formal  swallowing study to exclude aspiration. While the patient was in the  [facility], he continued to improve. He underwent a swallowing study on . At that time, there was a rapid decline in his condition. Dr. Haley Cuellar did  discuss with the family about his wishes at this time and they  instituted comfort care. He did subsequently aspirate at this time. Morphine and Ativan were instituted. The patient's condition declined  and he  at 03:10 a.m. on 2020. Cause of death at this time  was multiple system organ failure due to acute hypoxic-hypercapnic  respiratory failure, probably complicated by underlying aspiration in  the form of recent sepsis. No autopsy was performed. The remains were  returned to the  home. More than 40 minutes were spent on the day of discharge coordinating  documentation, along with discussion with nurse, pronouncing death, and  further paperwork preparation.         Jass Luo DO    D: 2020 6:43:28       T: 2020 6:51:16     [email or web address]  Job#: [id number]     Doc#: [id number]    CC:
